# Patient Record
Sex: MALE | Race: WHITE | NOT HISPANIC OR LATINO | Employment: FULL TIME | URBAN - METROPOLITAN AREA
[De-identification: names, ages, dates, MRNs, and addresses within clinical notes are randomized per-mention and may not be internally consistent; named-entity substitution may affect disease eponyms.]

---

## 2017-02-27 ENCOUNTER — TELEPHONE (OUTPATIENT)
Dept: PALLIATIVE MEDICINE | Facility: CLINIC | Age: 62
End: 2017-02-27

## 2017-03-09 ENCOUNTER — MYC MEDICAL ADVICE (OUTPATIENT)
Dept: FAMILY MEDICINE | Facility: CLINIC | Age: 62
End: 2017-03-09

## 2017-03-09 DIAGNOSIS — Z12.11 COLON CANCER SCREENING: Primary | ICD-10-CM

## 2017-03-10 NOTE — TELEPHONE ENCOUNTER
I don't see colonoscopy referral in past 12 mos.   Routed patient mychart request for referral to [facility] to provider to address.  Leelee Ricardo RN  [facility]

## 2017-03-17 ENCOUNTER — MYC MEDICAL ADVICE (OUTPATIENT)
Dept: FAMILY MEDICINE | Facility: CLINIC | Age: 62
End: 2017-03-17

## 2017-03-17 DIAGNOSIS — N52.9 ERECTILE DYSFUNCTION, UNSPECIFIED ERECTILE DYSFUNCTION TYPE: Primary | ICD-10-CM

## 2017-03-17 RX ORDER — SILDENAFIL CITRATE 20 MG/1
TABLET ORAL
Qty: 20 TABLET | Refills: 5 | Status: SHIPPED | OUTPATIENT
Start: 2017-03-17 | End: 2018-03-07

## 2017-03-17 NOTE — TELEPHONE ENCOUNTER
Please see MyChart message below.  Patient requesting a trial of Sildenafil.  No active/history of med noted in chart.    Routed to PCP to advise.    Concepción Caro RN  [facility]

## 2017-03-31 ENCOUNTER — TRANSFERRED RECORDS (OUTPATIENT)
Dept: HEALTH INFORMATION MANAGEMENT | Facility: CLINIC | Age: 62
End: 2017-03-31

## 2017-04-11 ENCOUNTER — OFFICE VISIT (OUTPATIENT)
Dept: FAMILY MEDICINE | Facility: CLINIC | Age: 62
End: 2017-04-11
Payer: COMMERCIAL

## 2017-04-11 VITALS
WEIGHT: 172 LBS | HEART RATE: 71 BPM | HEIGHT: 73 IN | BODY MASS INDEX: 22.8 KG/M2 | TEMPERATURE: 97.1 F | DIASTOLIC BLOOD PRESSURE: 70 MMHG | SYSTOLIC BLOOD PRESSURE: 116 MMHG | OXYGEN SATURATION: 97 %

## 2017-04-11 DIAGNOSIS — L57.0 ACTINIC KERATOSES: ICD-10-CM

## 2017-04-11 DIAGNOSIS — N52.9 ERECTILE DYSFUNCTION, UNSPECIFIED ERECTILE DYSFUNCTION TYPE: ICD-10-CM

## 2017-04-11 DIAGNOSIS — Z23 NEED FOR ZOSTAVAX ADMINISTRATION: ICD-10-CM

## 2017-04-11 DIAGNOSIS — Z00.00 ROUTINE GENERAL MEDICAL EXAMINATION AT A HEALTH CARE FACILITY: Primary | ICD-10-CM

## 2017-04-11 DIAGNOSIS — K64.4 EXTERNAL HEMORRHOID: ICD-10-CM

## 2017-04-11 DIAGNOSIS — Z11.59 NEED FOR HEPATITIS C SCREENING TEST: ICD-10-CM

## 2017-04-11 PROCEDURE — 90471 IMMUNIZATION ADMIN: CPT | Performed by: FAMILY MEDICINE

## 2017-04-11 PROCEDURE — 99396 PREV VISIT EST AGE 40-64: CPT | Mod: 25 | Performed by: FAMILY MEDICINE

## 2017-04-11 PROCEDURE — 17000 DESTRUCT PREMALG LESION: CPT | Performed by: FAMILY MEDICINE

## 2017-04-11 PROCEDURE — 17003 DESTRUCT PREMALG LES 2-14: CPT | Performed by: FAMILY MEDICINE

## 2017-04-11 PROCEDURE — 90736 HZV VACCINE LIVE SUBQ: CPT | Performed by: FAMILY MEDICINE

## 2017-04-11 NOTE — NURSING NOTE
"Chief Complaint   Patient presents with     Physical       Initial /70 (BP Location: Right arm, Patient Position: Chair, Cuff Size: Adult Regular)  Pulse 71  Temp 97.1  F (36.2  C) (Oral)  Ht 6' 0.5\" (1.842 m)  Wt 172 lb (78 kg)  SpO2 97%  BMI 23.01 kg/m2 Estimated body mass index is 23.01 kg/(m^2) as calculated from the following:    Height as of this encounter: 6' 0.5\" (1.842 m).    Weight as of this encounter: 172 lb (78 kg).  Medication Reconciliation: complete   Paige Cheney CMA       "

## 2017-04-11 NOTE — PROGRESS NOTES
SUBJECTIVE:     CC: Micky Topete is an 61 year old male who presents for a preventative health visit.     Physical   Annual:     Getting at least 3 servings of Calcium per day::  NO    Bi-annual eye exam::  Yes    Dental care twice a year::  Yes    Sleep apnea or symptoms of sleep apnea::  Daytime drowsiness    Diet::  Regular (no restrictions)    Frequency of exercise::  None    Taking medications regularly::  Yes    Medication side effects::  Not applicable    Additional concerns today::  YES      Other concerns:     Hep C screening, Hemorrhoids.  He noticed a small external hemorrhoid pop out 2-3 weeks ago. It was mildly uncomfortable. He had it present when he had his colonoscopy a week and a half ago. It's getting smaller. It is not bleeding.  His colonoscopy was basically normal with no polyps.    Today's PHQ-2 Score:   PHQ-2 ( 1999 [employer]) 4/11/2017   Q1: Little interest or pleasure in doing things -   Q2: Feeling down, depressed or hopeless -   PHQ-2 Score -   Little interest or pleasure in doing things Not at all   Feeling down, depressed or hopeless Not at all   PHQ-2 Score 0       Abuse: Current or Past(Physical, Sexual or Emotional)- No  Do you feel safe in your environment - Yes    Social History   Substance Use Topics     Smoking status: Never Smoker     Smokeless tobacco: Never Used     Alcohol use Yes     The patient does not drink >3 drinks per day nor >7 drinks per week.    Last PSA:   PSA   Date Value Ref Range Status   10/22/2009 0.77 0 - 4 ug/L Final       Recent Labs   Lab Test  08/21/12   0731  10/22/09   1233   CHOL  185  222*   HDL  42  50   LDL  111  149*   TRIG  159*  116   CHOLHDLRATIO  4.4  4.4       Reviewed orders with patient. Reviewed health maintenance and updated orders accordingly - Yes    Reviewed and updated as needed this visit by clinical staff  Tobacco  Allergies  Med Hx  Surg Hx  Fam Hx  Soc Hx        Reviewed and updated as needed this visit by Provider             ROS:  C: NEGATIVE for fever, chills, change in weight  INTEGUMENTARY/SKIN: He still has a few red scaly spots on his face  E: NEGATIVE for vision changes or irritation  ENT: NEGATIVE for ear, mouth and throat problems  R: NEGATIVE for significant cough or SOB  CV: NEGATIVE for chest pain, palpitations or peripheral edema  GI: NEGATIVE for nausea, abdominal pain, heartburn, or change in bowel habits   male: He's had some mild erectile dysfunction, mainly after drinking alcohol. Sildenafil helps. No major urinary obstructive symptoms.  M: NEGATIVE for significant arthralgias or myalgia  N: NEGATIVE for weakness, dizziness or paresthesias  P: NEGATIVE for changes in mood or affect    Patient Active Problem List   Diagnosis     Hyperlipidemia with target LDL less than 130     Advanced directives, counseling/discussion     History of basal cell cancer     Cataract of both eyes     Actinic keratoses     Erectile dysfunction, unspecified erectile dysfunction type     Past Surgical History:   Procedure Laterality Date     APPENDECTOMY       BIOPSY      Left forearm, basal cell     COLONOSCOPY      Age 51     HEAD & NECK SURGERY      5th & 7th cervical disk bulge     SOFT TISSUE SURGERY      Rt shoulder bursitis, ligament stretch     VASECTOMY         Social History   Substance Use Topics     Smoking status: Never Smoker     Smokeless tobacco: Never Used     Alcohol use Yes     Family History   Problem Relation Age of Onset     DIABETES Mother      Eye Disorder Mother      Glaucoma Mother      Macular Degeneration Mother      CEREBROVASCULAR DISEASE Mother      Hypertension Father      HEART DISEASE Father      Cancer - colorectal Paternal Grandfather          Current Outpatient Prescriptions   Medication Sig Dispense Refill     sildenafil (REVATIO/VIAGRA) 20 MG tablet Take 1-4 tabs prior to intercourse as needed 20 tablet 5     triprolidine-pseudoePHEDrine (APRODINE) 2.5-60 MG TABS Take 1 tablet by mouth every 6  "hours as needed for allergies (Patient not taking: Reported on 4/11/2017) 100 tablet 1     valACYclovir (VALTREX) 1000 mg tablet 2 grams now, then 12 hours later, as needed for cold sores (Patient not taking: Reported on 4/11/2017) 4 tablet 10     cyclobenzaprine (FLEXERIL) 10 MG tablet Take 1 tablet (10 mg) by mouth 3 times daily as needed for muscle spasms (Patient not taking: Reported on 4/11/2017) 20 tablet 1     Allergies   Allergen Reactions     Sulfa Drugs      OBJECTIVE:     /70 (BP Location: Right arm, Patient Position: Chair, Cuff Size: Adult Regular)  Pulse 71  Temp 97.1  F (36.2  C) (Oral)  Ht 6' 0.5\" (1.842 m)  Wt 172 lb (78 kg)  SpO2 97%  BMI 23.01 kg/m2  EXAM:  GENERAL: healthy, alert and no distress  EYES: Eyes grossly normal to inspection, PERRL and conjunctivae and sclerae normal  HENT: ear canals and TM's normal, nose and mouth without ulcers or lesions  NECK: no adenopathy, no asymmetry, masses, or scars and thyroid normal to palpation  RESP: lungs clear to auscultation - no rales, rhonchi or wheezes  CV: regular rate and rhythm, normal S1 S2, no S3 or S4, no murmur, click or rub, no peripheral edema and peripheral pulses strong  ABDOMEN: soft, nontender, no hepatosplenomegaly, no masses  RECTAL: Tiny external hemorrhoid remains. Prostate feels normal.  MS: no gross musculoskeletal defects noted, no edema  SKIN: No skin cancers are seen, but he does have an erythematous scaly area on his right temple, one on his left temple, 2 on the upper right forehead, and one on his right mid cheek. These are all consistent with actinic keratoses. They were frozen today with liquid nitrogen.  NEURO: Normal strength and tone, mentation intact and speech normal  PSYCH: mentation appears normal, affect normal/bright    ASSESSMENT/PLAN:         ICD-10-CM    1. Routine general medical examination at a health care facility Z00.00 Hepatitis C Screen Reflex to HCV RNA Quant and Genotype     GLUCOSE     " "Lipid panel reflex to direct LDL     CBC with platelets   2. Actinic keratoses L57.0 DESTRUCT PREMALIGNANT LESION, FIRST     DESTRUCT PREMALIGNANT LESION, 2-14   3. External hemorrhoid K64.4    4. Erectile dysfunction, unspecified erectile dysfunction type N52.9    5. Need for hepatitis C screening test Z11.59    6. Need for Zostavax administration Z23 ZOSTER VACC LIVE SUBQ NJX     ADMIN 1st VACCINE     Blood pressure and other vital signs look good  We discussed the above items   We will have her return soon for fasting lab work as ordered above  Discussed protecting his skin from further sun damage  We will give him a Zostavax vaccine today  Get plenty of fiber and fluids in diet to keep bowel movements soft and regular and easily passed and we will expect the hemorrhoid to resorb over time  Recheck in one year, or sooner prn    COUNSELING:   Reviewed preventive health counseling, as reflected in patient instructions       Regular exercise       Healthy diet/nutrition       Vaccinated for: Zoster       reports that he has never smoked. He has never used smokeless tobacco.    Estimated body mass index is 23.01 kg/(m^2) as calculated from the following:    Height as of this encounter: 6' 0.5\" (1.842 m).    Weight as of this encounter: 172 lb (78 kg).       Counseling Resources:  ATP IV Guidelines  Pooled Cohorts Equation Calculator  FRAX Risk Assessment  ICSI Preventive Guidelines  Dietary Guidelines for Americans, 2010  USDA's MyPlate  ASA Prophylaxis  Lung CA Screening    Federico Salas MD  [facility]      Answers for HPI/ROS submitted by the patient on 4/11/2017   Q1: Little interest or pleasure in doing things: 0=Not at all  Q2: Feeling down, depressed or hopeless: 0=Not at all  PHQ-2 Score: 0    "

## 2017-04-11 NOTE — NURSING NOTE
Patient is covered under this program for the following reason:  Not [facility]NV[facility] Eligible: Insured - Has insurance that covers the cost of all vaccines (Not [facility] elligible because insurance already covers all vaccines)    Patient/parent was given the [facility] Eligibility Information sheet today, , with their vaccinations.      Screening Questionnaire for Adult Immunization   Are you sick today?   No    Do you have allergies to medications, food, a vaccine component or latex?   No    Have you ever had a serious reaction after receiving a vaccination?   No    Do you have a long-term health problem with heart disease, lung disease,  asthma, kidney disease, metabolic (e.g., diabetes), anemia, or other blood disorder?   No    Do you have cancer, leukemia,HIV/ AIDS, or any immune system problem?   No       In the past 3 months, have you taken medications that weaken your immune system, such as cortisone, prednisone, other steroids, or anticancer drugs, or have you had radiation treatments?   No    Have you had a seizure or a brain, or other nervous system problem?   No    During the past year, have you received a transfusion of blood or blood       products, or been given a  immune (gamma) globulin or an antiviral drug?   No    For women: Are you pregnant or is there a chance you could become         pregnant during the next month?   No    Have you received any vaccinations in the past 4 weeks?   No     Immunization questionnaire answers were all negative.     VIS for Zostavax given on same date of administration.  Patient instructed to remain in clinic for 20 minutes afterwards, and to report any adverse reaction to me immediately.    Staff signature/Title: Paige Cheney CMA

## 2017-04-11 NOTE — MR AVS SNAPSHOT
After Visit Summary   4/11/2017    Micky Topete    MRN: [id number]           Patient Information     Date Of Birth          1955        Visit Information        Provider Department      4/11/2017 9:40 AM Federico Salas MD [facility]        Today's Diagnoses     Routine general medical examination at a health care facility    -  1    Actinic keratoses        Need for hepatitis C screening test        Need for Zostavax administration          Care Instructions      Preventive Health Recommendations  Male Ages 50 - 64    Yearly exam:             See your health care provider every year in order to  o   Review health changes.   o   Discuss preventive care.    o   Review your medicines if your doctor has prescribed any.     Have a cholesterol test every 5 years, or more frequently if you are at risk for high cholesterol/heart disease.     Have a diabetes test (fasting glucose) every three years. If you are at risk for diabetes, you should have this test more often.     Have a colonoscopy at age 50, or have a yearly FIT test (stool test). These exams will check for colon cancer.      Talk with your health care provider about whether or not a prostate cancer screening test (PSA) is right for you.    You should be tested each year for STDs (sexually transmitted diseases), if you re at risk.     Shots: Get a flu shot each year. Get a tetanus shot every 10 years.     Nutrition:    Eat at least 5 servings of fruits and vegetables daily.     Eat whole-grain bread, whole-wheat pasta and brown rice instead of white grains and rice.     Talk to your provider about Calcium and Vitamin D.     Lifestyle    Exercise for at least 150 minutes a week (30 minutes a day, 5 days a week). This will help you control your weight and prevent disease.     Limit alcohol to one drink per day.     No smoking.     Wear sunscreen to prevent skin cancer.     See your dentist every six months for an exam  and cleaning.     See your eye doctor every 1 to 2 years.          Follow-ups after your visit        Follow-up notes from your care team     Return in about 1 year (around 4/11/2018).      Future tests that were ordered for you today     Open Future Orders        Priority Expected Expires Ordered    Hepatitis C Screen Reflex to HCV RNA Quant and Genotype Routine 4/12/2017 5/31/2017 4/11/2017    GLUCOSE Routine 4/12/2017 5/31/2017 4/11/2017    Lipid panel reflex to direct LDL Routine 4/12/2017 5/31/2017 4/11/2017    CBC with platelets Routine 4/12/2017 5/31/2017 4/11/2017            Who to contact     If you have questions or need follow up information about today's clinic visit or your schedule please contact [facility] directly at [phone].  Normal or non-critical lab and imaging results will be communicated to you by SPOhart, letter or phone within 4 business days after the clinic has received the results. If you do not hear from us within 7 days, please contact the clinic through SPOhart or phone. If you have a critical or abnormal lab result, we will notify you by phone as soon as possible.  Submit refill requests through [employer] or call your pharmacy and they will forward the refill request to us. Please allow 3 business days for your refill to be completed.          Additional Information About Your Visit        SPOhart Information     [employer] gives you secure access to your electronic health record. If you see a primary care provider, you can also send messages to your care team and make appointments. If you have questions, please call your primary care clinic.  If you do not have a primary care provider, please call [phone] and they will assist you.        Care EveryWhere ID     This is your Care EveryWhere ID. This could be used by other organizations to access your [facility] medical records  [id number]        Your Vitals Were     Pulse Temperature Height Pulse Oximetry BMI  "(Body Mass Index)       71 97.1  F (36.2  C) (Oral) 6' 0.5\" (1.842 m) 97% 23.01 kg/m2        Blood Pressure from Last 3 Encounters:   04/11/17 116/70   12/17/15 116/69   03/23/15 122/73    Weight from Last 3 Encounters:   04/11/17 172 lb (78 kg)   12/17/15 171 lb (77.6 kg)   03/23/15 175 lb 12 oz (79.7 kg)              We Performed the Following     ADMIN 1st VACCINE     ZOSTER VACC LIVE SUBQ NJX        Primary Care Provider Office Phone # Fax #    Federico Salas [phone]       [facility] [address] [facility] [address]        Thank you!     Thank you for choosing [facility]  for your care. Our goal is always to provide you with excellent care. Hearing back from our patients is one way we can continue to improve our services. Please take a few minutes to complete the written survey that you may receive in the mail after your visit with us. Thank you!             Your Updated Medication List - Protect others around you: Learn how to safely use, store and throw away your medicines at www.disposemymeds.org.          This list is accurate as of: 4/11/17 10:28 AM.  Always use your most recent med list.                   Brand Name Dispense Instructions for use    cyclobenzaprine 10 MG tablet    FLEXERIL    20 tablet    Take 1 tablet (10 mg) by mouth 3 times daily as needed for muscle spasms       sildenafil 20 MG tablet    REVATIO/VIAGRA    20 tablet    Take 1-4 tabs prior to intercourse as needed       triprolidine-pseudoePHEDrine 2.5-60 MG Tabs per tablet    APRODINE    100 tablet    Take 1 tablet by mouth every 6 hours as needed for allergies       valACYclovir 1000 mg tablet    VALTREX    4 tablet    2 grams now, then 12 hours later, as needed for cold sores         "

## 2017-04-25 DIAGNOSIS — Z00.00 ROUTINE GENERAL MEDICAL EXAMINATION AT A HEALTH CARE FACILITY: ICD-10-CM

## 2017-04-25 LAB
CHOLEST SERPL-MCNC: 171 MG/DL
ERYTHROCYTE [DISTWIDTH] IN BLOOD BY AUTOMATED COUNT: 12.5 % (ref 10–15)
GLUCOSE SERPL-MCNC: 98 MG/DL (ref 70–99)
HCT VFR BLD AUTO: 42.8 % (ref 40–53)
HCV AB SERPL QL IA: NORMAL
HDLC SERPL-MCNC: 53 MG/DL
HGB BLD-MCNC: 14.5 G/DL (ref 13.3–17.7)
LDLC SERPL CALC-MCNC: 86 MG/DL
MCH RBC QN AUTO: 30.5 PG (ref 26.5–33)
MCHC RBC AUTO-ENTMCNC: 33.9 G/DL (ref 31.5–36.5)
MCV RBC AUTO: 90 FL (ref 78–100)
NONHDLC SERPL-MCNC: 118 MG/DL
PLATELET # BLD AUTO: 210 10E9/L (ref 150–450)
RBC # BLD AUTO: 4.75 10E12/L (ref 4.4–5.9)
TRIGL SERPL-MCNC: 159 MG/DL
WBC # BLD AUTO: 6.1 10E9/L (ref 4–11)

## 2017-04-25 PROCEDURE — 36415 COLL VENOUS BLD VENIPUNCTURE: CPT | Performed by: FAMILY MEDICINE

## 2017-04-25 PROCEDURE — 82947 ASSAY GLUCOSE BLOOD QUANT: CPT | Performed by: FAMILY MEDICINE

## 2017-04-25 PROCEDURE — 80061 LIPID PANEL: CPT | Performed by: FAMILY MEDICINE

## 2017-04-25 PROCEDURE — 85027 COMPLETE CBC AUTOMATED: CPT | Performed by: FAMILY MEDICINE

## 2017-04-25 PROCEDURE — 86803 HEPATITIS C AB TEST: CPT | Performed by: FAMILY MEDICINE

## 2017-04-26 NOTE — PROGRESS NOTES
Boyd,  These lab results look really good. Your hepatitis C screening antibody test is normal/negative. Blood sugar is normal. Cholesterol values are basically normal and improved from years ago. Blood counts are all normal.  Keep up the good work!    Shravan

## 2017-05-15 ENCOUNTER — MYC MEDICAL ADVICE (OUTPATIENT)
Dept: FAMILY MEDICINE | Facility: CLINIC | Age: 62
End: 2017-05-15

## 2017-06-06 ENCOUNTER — OFFICE VISIT (OUTPATIENT)
Dept: FAMILY MEDICINE | Facility: CLINIC | Age: 62
End: 2017-06-06
Payer: COMMERCIAL

## 2017-06-06 VITALS
HEART RATE: 66 BPM | WEIGHT: 172 LBS | OXYGEN SATURATION: 97 % | BODY MASS INDEX: 23.01 KG/M2 | DIASTOLIC BLOOD PRESSURE: 71 MMHG | SYSTOLIC BLOOD PRESSURE: 123 MMHG | TEMPERATURE: 97.1 F
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
## 2017-06-06 DIAGNOSIS — M54.50 RIGHT-SIDED LOW BACK PAIN WITHOUT SCIATICA, UNSPECIFIED CHRONICITY: Primary | ICD-10-CM

## 2017-06-06 DIAGNOSIS — S70.361A TICK BITE OF RIGHT THIGH, INITIAL ENCOUNTER: ICD-10-CM

## 2017-06-06 PROCEDURE — 99214 OFFICE O/P EST MOD 30 MIN: CPT | Performed by: FAMILY MEDICINE

## 2017-06-06 ASSESSMENT — PAIN SCALES - GENERAL: PAINLEVEL: NO PAIN (0)

## 2017-06-06 NOTE — MR AVS SNAPSHOT
After Visit Summary   6/6/2017    Mikcy Topete    MRN: [id number]           Patient Information     Date Of Birth          1955        Visit Information        Provider Department      6/6/2017 6:40 AM Federico Salas MD [facility]        Today's Diagnoses     Right-sided low back pain without sciatica, unspecified chronicity    -  1    Tick bite of right thigh, initial encounter           Follow-ups after your visit        Follow-up notes from your care team     Return if symptoms worsen or fail to improve.      Your next 10 appointments already scheduled     Jun 08, 2017  7:45 PM CDT   MR LUMBAR SPINE W/O CONTRAST with [id number]   [facility]    [address] [facility] [address] [phone]           Take your medicines as usual, unless your doctor tells you not to. Bring a list of your current medicines to your exam (including vitamins, minerals and over-the-counter drugs). Also bring the results of similar scans you may have had.  Please remove any body piercings and hair extensions before you arrive.  Follow your doctor s orders. If you do not, we may have to postpone your exam.  You will not have contrast for this exam. You do not need to do anything special to prepare.  The MRI machine uses a strong magnet. Please wear clothes without metal (snaps, zippers). A sweatsuit works well, or we may give you a hospital gown.   **IMPORTANT** THE INSTRUCTIONS BELOW ARE ONLY FOR THOSE PATIENTS WHO HAVE BEEN TOLD THEY WILL RECEIVE SEDATION OR GENERAL ANESTHESIA DURING THEIR MRI PROCEDURE:  IF YOU WILL RECEIVE SEDATION (take medicine to help you relax during your exam):   You must get the medicine from your doctor before you arrive. Bring the medicine to the exam. Do not take it at home.   Arrive one hour early. Bring someone who can take you home after the test. Your medicine will make you sleepy. After the exam, you may not  drive, take a bus or take a taxi by yourself.   No eating 8 hours before your exam. You may have clear liquids up until 4 hours before your exam. (Clear liquids include water, clear tea, black coffee and fruit juice without pulp.)  IF YOU WILL RECEIVE ANESTHESIA (be asleep for your exam):   Arrive 1 1/2 hours early. Bring someone who can take you home after the test. You may not drive, take a bus or take a taxi by yourself.   No eating 8 hours before your exam. You may have clear liquids up until 4 hours before your exam. (Clear liquids include water, clear tea, black coffee and fruit juice without pulp.)   You will spend four to five hours in the recovery room.  Please call the Imaging Department at your exam site with any questions.              Future tests that were ordered for you today     Open Future Orders        Priority Expected Expires Ordered    MR Lumbar Spine w/o Contrast Routine  6/6/2018 6/6/2017            Who to contact     If you have questions or need follow up information about today's clinic visit or your schedule please contact [facility] directly at [phone].  Normal or non-critical lab and imaging results will be communicated to you by [employer], letter or phone within 4 business days after the clinic has received the results. If you do not hear from us within 7 days, please contact the clinic through [employer] or phone. If you have a critical or abnormal lab result, we will notify you by phone as soon as possible.  Submit refill requests through [employer] or call your pharmacy and they will forward the refill request to us. Please allow 3 business days for your refill to be completed.          Additional Information About Your Visit        [employer] Information     [employer] gives you secure access to your electronic health record. If you see a primary care provider, you can also send messages to your care team and make appointments. If you have questions, please call your  primary care clinic.  If you do not have a primary care provider, please call [phone] and they will assist you.        Care EveryWhere ID     This is your Care EveryWhere ID. This could be used by other organizations to access your [facility] medical records  [id number]        Your Vitals Were     Pulse Temperature Pulse Oximetry BMI (Body Mass Index)          66 97.1  F (36.2  C) (Oral) 97% 23.01 kg/m2         Blood Pressure from Last 3 Encounters:   06/06/17 123/71   04/11/17 116/70   12/17/15 116/69    Weight from Last 3 Encounters:   06/06/17 172 lb (78 kg)   04/11/17 172 lb (78 kg)   12/17/15 171 lb (77.6 kg)               Primary Care Provider Office Phone # Fax #    Federico Salas [phone]       [facility] [address] [facility] [address]        Thank you!     Thank you for choosing [facility]  for your care. Our goal is always to provide you with excellent care. Hearing back from our patients is one way we can continue to improve our services. Please take a few minutes to complete the written survey that you may receive in the mail after your visit with us. Thank you!             Your Updated Medication List - Protect others around you: Learn how to safely use, store and throw away your medicines at www.disposemymeds.org.          This list is accurate as of: 6/6/17  7:22 AM.  Always use your most recent med list.                   Brand Name Dispense Instructions for use    cyclobenzaprine 10 MG tablet    FLEXERIL    20 tablet    Take 1 tablet (10 mg) by mouth 3 times daily as needed for muscle spasms       sildenafil 20 MG tablet    REVATIO/VIAGRA    20 tablet    Take 1-4 tabs prior to intercourse as needed       triprolidine-pseudoePHEDrine 2.5-60 MG Tabs per tablet    APRODINE    100 tablet    Take 1 tablet by mouth every 6 hours as needed for allergies       valACYclovir 1000 mg tablet    VALTREX    4 tablet    2 grams now,  then 12 hours later, as needed for cold sores

## 2017-06-06 NOTE — NURSING NOTE
"Chief Complaint   Patient presents with     Back Pain     Low back strain, stiff, when moving wrong will have a sharp pain. Numbness in toes     Insect Bites     Tick bite       Initial /71 (BP Location: Right arm, Patient Position: Chair, Cuff Size: Adult Regular)  Pulse 66  Temp 97.1  F (36.2  C) (Oral)  Wt 172 lb (78 kg)  SpO2 97%  BMI 23.01 kg/m2 Estimated body mass index is 23.01 kg/(m^2) as calculated from the following:    Height as of 4/11/17: 6' 0.5\" (1.842 m).    Weight as of this encounter: 172 lb (78 kg).  Medication Reconciliation: complete   Paige Cheney CMA       "

## 2017-06-06 NOTE — PROGRESS NOTES
SUBJECTIVE:                                                    Micky Topete is a 61 year old male who presents to clinic today for the following health issues:      Back Pain      Duration: 2 years        Specific cause: turning/bending    Description:   Location of pain: low back right  Character of pain: cramping and when twisting stabbing pain  Pain radiation:none  New numbness or weakness in legs, not attributed to pain:  YES- numbness in the toes    Intensity: Currently 0/10, At its worst 3/10    History:   Pain interferes with job: YES, did yesterday  History of back problems: recurrent self limited episodes of low back pain in the past  Any previous MRI or X-rays: None in the last 20 years  Sees a specialist for back pain:  Chiropractor  Therapies tried without relief: none    Alleviating factors:   Improved by: chiropractor, cold, heat, massage, muscle relaxants and standing      Precipitating factors:  Worsened by: Lying flat or on his side    Functional and Psychosocial Screen (Mirlande STarT Back):      Not performed today       Accompanying Signs & Symptoms:  Risk of Fracture:  None  Risk of Cauda Equina:  None  Risk of Infection:  None  Risk of Cancer:  None  Risk of Ankylosing Spondylitis:  Onset at age <35, male, AND morning back stiffness. YES                 He started having low back discomfort at the age of 27 after doing some shoveling. He said intermittent right low back discomfort since then. In the last couple of years as gotten worse. He sees a chiropractor periodically for this. He just saw a chiropractor yesterday. He had an adjustment and that did seem to help.  The discomfort is mainly in the right low back. It does not go down the legs. He has had some mild numbness of his second and third toes at times. He was concerned about that.  He does have a family history of back and leg problems in his father, brother, and paternal uncle.  He has gone to physical therapy in the past. He is  interested in doing some imaging to further characterize the health of his back.    Check tick bite on inside right thigh, also. About a week ago he pulled off a wood tick from his right inner thigh. It had been on him for less than 24 hours. It did become increasingly red and swollen for a few days, but now it seems better again.      Problem list and histories reviewed & adjusted, as indicated.  Additional history: as documented    Patient Active Problem List   Diagnosis     Hyperlipidemia with target LDL less than 130     Advanced directives, counseling/discussion     History of basal cell cancer     Cataract of both eyes     Actinic keratoses     Erectile dysfunction, unspecified erectile dysfunction type     Right-sided low back pain without sciatica, unspecified chronicity     Past Surgical History:   Procedure Laterality Date     APPENDECTOMY       BIOPSY      Left forearm, basal cell     COLONOSCOPY      Age 51     HEAD & NECK SURGERY      5th & 7th cervical disk bulge     SOFT TISSUE SURGERY      Rt shoulder bursitis, ligament stretch     VASECTOMY         Social History   Substance Use Topics     Smoking status: Never Smoker     Smokeless tobacco: Never Used     Alcohol use Yes     Family History   Problem Relation Age of Onset     DIABETES Mother      Eye Disorder Mother      Glaucoma Mother      Macular Degeneration Mother      CEREBROVASCULAR DISEASE Mother      Hypertension Father      HEART DISEASE Father      Cancer - colorectal Paternal Grandfather          Current Outpatient Prescriptions   Medication Sig Dispense Refill     sildenafil (REVATIO/VIAGRA) 20 MG tablet Take 1-4 tabs prior to intercourse as needed 20 tablet 5     triprolidine-pseudoePHEDrine (APRODINE) 2.5-60 MG TABS Take 1 tablet by mouth every 6 hours as needed for allergies 100 tablet 1     valACYclovir (VALTREX) 1000 mg tablet 2 grams now, then 12 hours later, as needed for cold sores (Patient not taking: Reported on 4/11/2017) 4  tablet 10     cyclobenzaprine (FLEXERIL) 10 MG tablet Take 1 tablet (10 mg) by mouth 3 times daily as needed for muscle spasms (Patient not taking: Reported on 4/11/2017) 20 tablet 1     Allergies   Allergen Reactions     Sulfa Drugs        Reviewed and updated as needed this visit by clinical staff  Tobacco  Allergies  Med Hx  Surg Hx  Fam Hx  Soc Hx      Reviewed and updated as needed this visit by Provider         ROS:  Noncontributory except as above    OBJECTIVE:                                                    /71 (BP Location: Right arm, Patient Position: Chair, Cuff Size: Adult Regular)  Pulse 66  Temp 97.1  F (36.2  C) (Oral)  Wt 172 lb (78 kg)  SpO2 97%  BMI 23.01 kg/m2  Body mass index is 23.01 kg/(m^2).  GENERAL: healthy, alert and no distress  MS: He does have some mild right-sided discomfort palpation over the right lumbar paraspinous region. Slight increased discomfort there when he leans to the right. No significant pain with side-to-side trunk rotation or for flexion. Straight leg raising is negative bilaterally.  SKIN: He has some mild erythema around the area where he pulled off a tick a week ago. The erythema extends about 2 cm. No purulent drainage. He says this is improved from a few days ago.  NEURO: Normal strength and tone, perhaps slight decreased sensation over the second and third toes bilaterally. The rest of his sensation in the lower extremities is normal.    Diagnostic Test Results:  none      ASSESSMENT/PLAN:                                                        ICD-10-CM    1. Right-sided low back pain without sciatica, unspecified chronicity M54.5 MR Lumbar Spine w/o Contrast   2. Tick bite of right thigh, initial encounter S70.361A     W57.XXXA      I don't think his toe numbness is related to the back, but given his prolonged intermittent history of back problems, we elected to proceed with an MRI to further define his back anatomy  His chiropractor thought  this would be helpful as well  We will set that up for him and inform him of those results when available  In the meantime, continue with conservative therapy including stretching and strengthening exercises, chiropractic adjustments, Flexeril as needed, etc.  We will continue to follow the tick bite  I expect that to continue to resolve on its own  Recheck p.r.n. on that    Federico Salas MD  [facility]

## 2017-06-08 ENCOUNTER — RADIANT APPOINTMENT (OUTPATIENT)
Dept: MRI IMAGING | Facility: CLINIC | Age: 62
End: 2017-06-08
Attending: FAMILY MEDICINE
Payer: COMMERCIAL

## 2017-06-08 DIAGNOSIS — M54.50 RIGHT-SIDED LOW BACK PAIN WITHOUT SCIATICA, UNSPECIFIED CHRONICITY: ICD-10-CM

## 2017-06-08 PROCEDURE — 72148 MRI LUMBAR SPINE W/O DYE: CPT | Mod: TC

## 2017-06-09 NOTE — PROGRESS NOTES
Boyd,  You can access the results of your MRI results now.  Your lumbar MRI does show a number of degenerative changes present, most pronounced at L4-L5. Degenerative changes are more prominent on the right side than left, which could account for your right low back discomfort.  There is nothing here that would necessarily require surgery. I would suggest continuing with conservative treatment including chiropractic treatments/adjustments as needed. If your symptoms worsen over time, we could consider epidural spinal injections and/or referral to a back specialist or possible surgical treatment.    Shravan

## 2017-06-20 ENCOUNTER — MYC MEDICAL ADVICE (OUTPATIENT)
Dept: FAMILY MEDICINE | Facility: CLINIC | Age: 62
End: 2017-06-20

## 2017-06-20 DIAGNOSIS — M54.50 RIGHT-SIDED LOW BACK PAIN WITHOUT SCIATICA, UNSPECIFIED CHRONICITY: Primary | ICD-10-CM

## 2017-06-20 NOTE — TELEPHONE ENCOUNTER
Please see MyChart message below regarding PT referral.    Routed to PCP.    Concepción Caro RN  [facility]

## 2017-06-27 ENCOUNTER — THERAPY VISIT (OUTPATIENT)
Dept: PHYSICAL THERAPY | Facility: CLINIC | Age: 62
End: 2017-06-27
Payer: COMMERCIAL

## 2017-06-27 DIAGNOSIS — G89.29 CHRONIC RIGHT-SIDED LOW BACK PAIN WITHOUT SCIATICA: Primary | ICD-10-CM

## 2017-06-27 DIAGNOSIS — M54.50 CHRONIC RIGHT-SIDED LOW BACK PAIN WITHOUT SCIATICA: Primary | ICD-10-CM

## 2017-06-27 PROCEDURE — 97161 PT EVAL LOW COMPLEX 20 MIN: CPT | Mod: GP | Performed by: PHYSICAL THERAPIST

## 2017-06-27 PROCEDURE — 97110 THERAPEUTIC EXERCISES: CPT | Mod: GP | Performed by: PHYSICAL THERAPIST

## 2017-06-27 NOTE — PROGRESS NOTES
[facility] Initial Evaluation      Subjective:    Patient is a 62 year old male presenting with rehab back hpi. The history is provided by the patient.   Micky Topete is a 62 year old male with a lumbar condition.  Condition occurred with:  Degenerative joint disease.  Condition occurred: for unknown reasons.  This is a chronic condition  Chronic with recent flare up after shoveling snow. MD referral 6/20/2017.  .    Patient reports pain:  Lumbar spine right and lower lumbar spine.  Radiates to:  No radiation.  Pain is described as aching and sharp and is intermittent and reported as 1/10 (only pain with certain motions).  Associated symptoms:  Loss of motion/stiffness and loss of motion. Pain is the same all the time.  Symptoms are exacerbated by twisting and bending (SB) and relieved by activity/movement and rest.  Since onset symptoms are unchanged.  Special tests:  MRI (DDD L4-5; foraminal stenosis greatest R L4-5;  broad based central disc protrusion L4-5).      General health as reported by patient is good.  Pertinent medical history includes:  None (back spasms).  Medical allergies: yes (sulfa).    Current medications:  Anti-inflammatory and other (naprxen prn).  Current occupation is pharmacist.  Patient is working in normal job without restrictions.  Primary job tasks include:  Prolonged standing and repetitive tasks.        Red flags:  Pain at rest/night (night pain with transfers).                        Objective:    Standing Alignment:        Lumbar:  Normal                           Lumbar/SI Evaluation  ROM:    AROM Lumbar:   Flexion:            Full  Ext:                    Full   Side Bend:        Left:  Decreased 25%    Right:  Decreased 25%  Rotation:           Left:  Full with pain    Right:  Full  Side Glide:        Left:     Right:           Lumbar Myotomes:  normal                  Neural Tension/Mobility:      Left side:SLR or Slump  negative.     Right side:   Slump  or SLR  negative.   Lumbar Palpation:      Tenderness not present at Left:    Quadratus Lumborum; Erector Spinae or Piriformis  Tenderness present at Right: Quadratus Lumborum; Erector Spinae and Piriformis                                                     General     ROS    Assessment/Plan:      Patient is a 62 year old male with lumbar complaints.    Patient has the following significant findings with corresponding treatment plan.                Diagnosis 1:  R LBP  Pain -  manual therapy, self management, education, directional preference exercise and home program  Decreased ROM/flexibility - manual therapy and therapeutic exercise  Decreased strength - therapeutic exercise and therapeutic activities  Decreased function - therapeutic activities    Therapy Evaluation Codes:   1) History comprised of:   Personal factors that impact the plan of care:      None.    Comorbidity factors that impact the plan of care are:      None.     Medications impacting care: Anti-inflammatory.  2) Examination of Body Systems comprised of:   Body structures and functions that impact the plan of care:      Lumbar spine.   Activity limitations that impact the plan of care are:      transfers.  3) Clinical presentation characteristics are:   Stable/Uncomplicated.  4) Decision-Making    Low complexity using standardized patient assessment instrument and/or measureable assessment of functional outcome.  Cumulative Therapy Evaluation is: Low complexity.    Previous and current functional limitations:  (See Goal Flow Sheet for this information)    Short term and Long term goals: (See Goal Flow Sheet for this information)     Communication ability:  Patient appears to be able to clearly communicate and understand verbal and written communication and follow directions correctly.  Treatment Explanation - The following has been discussed with the patient:   RX ordered/plan of care  Anticipated outcomes  Possible risks and side effects  This  patient would benefit from PT intervention to resume normal activities.   Rehab potential is good.    Frequency:  2 X a month, once daily  Duration:  for 2 months  Discharge Plan:  Achieve all LTG.  Independent in home treatment program.  Reach maximal therapeutic benefit.    Please refer to the daily flowsheet for treatment today, total treatment time and time spent performing 1:1 timed codes.

## 2017-06-27 NOTE — MR AVS SNAPSHOT
After Visit Summary   6/27/2017    Micky Topete    MRN: [id number]           Patient Information     Date Of Birth          1955        Visit Information        Provider Department      6/27/2017 9:10 AM Sathya Bennett, PT [facility]        Today's Diagnoses     Chronic right-sided low back pain without sciatica    -  1       Follow-ups after your visit        Your next 10 appointments already scheduled     Jul 03, 2017  4:40 PM CDT   (Arrive by 3:20 PM)   [facility] Spine with Sathya Bennett PT   [facility] ([facility])    [address]  [facility] [id number] [phone]              Who to contact     If you have questions or need follow up information about today's clinic visit or your schedule please contact [facility] PT directly at [phone].  Normal or non-critical lab and imaging results will be communicated to you by MyChart, letter or phone within 4 business days after the clinic has received the results. If you do not hear from us within 7 days, please contact the clinic through T-PRO Solutionshart or phone. If you have a critical or abnormal lab result, we will notify you by phone as soon as possible.  Submit refill requests through Zazuba or call your pharmacy and they will forward the refill request to us. Please allow 3 business days for your refill to be completed.          Additional Information About Your Visit        MyChart Information     Z[employer] gives you secure access to your electronic health record. If you see a primary care provider, you can also send messages to your care team and make appointments. If you have questions, please call your primary care clinic.  If you do not have a primary care provider, please call [phone] and they will assist you.        Care EveryWhere ID     This is your Care EveryWhere ID. This could be used  by other organizations to access your [place] medical records  [id number]         Blood Pressure from Last 3 Encounters:   06/06/17 123/71   04/11/17 116/70   12/17/15 116/69    Weight from Last 3 Encounters:   06/06/17 78 kg (172 lb)   04/11/17 78 kg (172 lb)   12/17/15 77.6 kg (171 lb)              We Performed the Following     HC PT EVAL, LOW COMPLEXITY     MARY INITIAL EVAL REPORT     THERAPEUTIC EXERCISES        Primary Care Provider Office Phone # Fax #    Federico Salas [phone]       [facility] [address]  [facility] [place] [address]        Equal Access to Services     LUIGI MIN : Hadii ashley zaldivaro Soalanna, waaxda luqadaha, qaybta kaalmada adeegyada, jerald lay. So [facility] [phone].    ATENCIÓN: Si habla español, tiene a cai disposición servicios gratuitos de asistencia lingüística. Llame al [phone].    We comply with applicable federal civil rights laws and [place] laws. We do not discriminate on the basis of race, color, national origin, age, disability sex, sexual orientation or gender identity.            Thank you!     Thank you for choosing [facility]  for your care. Our goal is always to provide you with excellent care. Hearing back from our patients is one way we can continue to improve our services. Please take a few minutes to complete the written survey that you may receive in the mail after your visit with us. Thank you!             Your Updated Medication List - Protect others around you: Learn how to safely use, store and throw away your medicines at www.disposemymeds.org.          This list is accurate as of: 6/27/17  1:02 PM.  Always use your most recent med list.                   Brand Name Dispense Instructions for use Diagnosis    cyclobenzaprine 10 MG tablet    FLEXERIL    20 tablet    Take 1 tablet (10 mg) by mouth 3 times daily as needed for muscle spasms     Back pain       sildenafil 20 MG tablet    REVATIO/VIAGRA    20 tablet    Take 1-4 tabs prior to intercourse as needed    Erectile dysfunction, unspecified erectile dysfunction type       triprolidine-pseudoePHEDrine 2.5-60 MG Tabs per tablet    APRODINE    100 tablet    Take 1 tablet by mouth every 6 hours as needed for allergies    Allergic rhinitis, unspecified allergic rhinitis trigger, unspecified rhinitis seasonality       valACYclovir 1000 mg tablet    VALTREX    4 tablet    2 grams now, then 12 hours later, as needed for cold sores    Herpes labialis

## 2017-07-03 ENCOUNTER — THERAPY VISIT (OUTPATIENT)
Dept: PHYSICAL THERAPY | Facility: CLINIC | Age: 62
End: 2017-07-03
Payer: COMMERCIAL

## 2017-07-03 DIAGNOSIS — M54.50 CHRONIC RIGHT-SIDED LOW BACK PAIN WITHOUT SCIATICA: ICD-10-CM

## 2017-07-03 DIAGNOSIS — G89.29 CHRONIC RIGHT-SIDED LOW BACK PAIN WITHOUT SCIATICA: ICD-10-CM

## 2017-07-03 PROCEDURE — 97110 THERAPEUTIC EXERCISES: CPT | Mod: GP | Performed by: PHYSICAL THERAPIST

## 2017-07-24 ENCOUNTER — THERAPY VISIT (OUTPATIENT)
Dept: PHYSICAL THERAPY | Facility: CLINIC | Age: 62
End: 2017-07-24
Payer: COMMERCIAL

## 2017-07-24 DIAGNOSIS — G89.29 CHRONIC RIGHT-SIDED LOW BACK PAIN WITHOUT SCIATICA: ICD-10-CM

## 2017-07-24 DIAGNOSIS — M54.50 CHRONIC RIGHT-SIDED LOW BACK PAIN WITHOUT SCIATICA: ICD-10-CM

## 2017-07-24 PROCEDURE — 97110 THERAPEUTIC EXERCISES: CPT | Mod: GP | Performed by: PHYSICAL THERAPIST

## 2017-07-24 PROCEDURE — 97112 NEUROMUSCULAR REEDUCATION: CPT | Mod: GP | Performed by: PHYSICAL THERAPIST

## 2017-08-21 ENCOUNTER — THERAPY VISIT (OUTPATIENT)
Dept: PHYSICAL THERAPY | Facility: CLINIC | Age: 62
End: 2017-08-21
Payer: COMMERCIAL

## 2017-08-21 DIAGNOSIS — M54.50 CHRONIC RIGHT-SIDED LOW BACK PAIN WITHOUT SCIATICA: ICD-10-CM

## 2017-08-21 DIAGNOSIS — G89.29 CHRONIC RIGHT-SIDED LOW BACK PAIN WITHOUT SCIATICA: ICD-10-CM

## 2017-08-21 PROCEDURE — 97110 THERAPEUTIC EXERCISES: CPT | Mod: GP | Performed by: PHYSICAL THERAPIST

## 2017-08-21 PROCEDURE — 97140 MANUAL THERAPY 1/> REGIONS: CPT | Mod: GP | Performed by: PHYSICAL THERAPIST

## 2017-08-21 NOTE — MR AVS SNAPSHOT
After Visit Summary   8/21/2017    Micky Topete    MRN: [id number]           Patient Information     Date Of Birth          1955        Visit Information        Provider Department      8/21/2017 4:00 PM Sathya Bennett, PT [facility]        Today's Diagnoses     Chronic right-sided low back pain without sciatica           Follow-ups after your visit        Your next 10 appointments already scheduled     Aug 24, 2017  2:30 PM CDT   Return Visit with Rubi Thomas OD   [facility] ([facility]    [address] [facility] [id number] [phone]            Sep 07, 2017  4:00 PM CDT   [facility] Spine with Sathya Bennett PT   [facility] ([facility] [place] [facility])    [address] [facility] [id number] [phone]              Who to contact     If you have questions or need follow up information about today's clinic visit or your schedule please contact [facility] PT directly at [phone].  Normal or non-critical lab and imaging results will be communicated to you by Captive[employer]hart, letter or phone within 4 business days after the clinic has received the results. If you do not hear from us within 7 days, please contact the clinic through [employer]hart or phone. If you have a critical or abnormal lab result, we will notify you by phone as soon as possible.  Submit refill requests through [employer] or call your pharmacy and they will forward the refill request to us. Please allow 3 business days for your refill to be completed.          Additional Information About Your Visit        MyChart Information     [employer] gives you secure access to your electronic health record. If you see a primary care provider, you can also send messages to your care team and make appointments. If you have questions, please call your primary care clinic.  If you  do not have a primary care provider, please call [phone] and they will assist you.        Care EveryWhere ID     This is your Care EveryWhere ID. This could be used by other organizations to access your [place] medical records  [id number]         Blood Pressure from Last 3 Encounters:   06/06/17 123/71   04/11/17 116/70   12/17/15 116/69    Weight from Last 3 Encounters:   06/06/17 78 kg (172 lb)   04/11/17 78 kg (172 lb)   12/17/15 77.6 kg (171 lb)              We Performed the Following     MANUAL THER TECH,1+REGIONS,EA 15 MIN     THERAPEUTIC EXERCISES        Primary Care Provider Office Phone # Fax #    Federico Salas [phone]       [address] [facility] [place] [address]        Equal Access to Services     LUIGI MIN : Hadii aad ku hadasho Soomaali, waaxda luqadaha, qaybta kaalmada adeegyada, jerald canchola hayirish santoro . So [facility] [phone].    ATENCIÓN: Si habla español, tiene a cai disposición servicios gratuitos de asistencia lingüística. Roland al [phone].    We comply with applicable federal civil rights laws and [place] laws. We do not discriminate on the basis of race, color, national origin, age, disability sex, sexual orientation or gender identity.            Thank you!     Thank you for choosing [facility]  for your care. Our goal is always to provide you with excellent care. Hearing back from our patients is one way we can continue to improve our services. Please take a few minutes to complete the written survey that you may receive in the mail after your visit with us. Thank you!             Your Updated Medication List - Protect others around you: Learn how to safely use, store and throw away your medicines at www.disposemymeds.org.          This list is accurate as of: 8/21/17  4:38 PM.  Always use your most recent med list.                   Brand Name Dispense Instructions for use Diagnosis     cyclobenzaprine 10 MG tablet    FLEXERIL    20 tablet    Take 1 tablet (10 mg) by mouth 3 times daily as needed for muscle spasms    Back pain       sildenafil 20 MG tablet    REVATIO/VIAGRA    20 tablet    Take 1-4 tabs prior to intercourse as needed    Erectile dysfunction, unspecified erectile dysfunction type       triprolidine-pseudoePHEDrine 2.5-60 MG Tabs per tablet    APRODINE    100 tablet    Take 1 tablet by mouth every 6 hours as needed for allergies    Allergic rhinitis, unspecified allergic rhinitis trigger, unspecified rhinitis seasonality       valACYclovir 1000 mg tablet    VALTREX    4 tablet    2 grams now, then 12 hours later, as needed for cold sores    Herpes labialis

## 2017-08-24 ENCOUNTER — OFFICE VISIT (OUTPATIENT)
Dept: OPTOMETRY | Facility: CLINIC | Age: 62
End: 2017-08-24
Payer: COMMERCIAL

## 2017-08-24 DIAGNOSIS — H43.811 PVD (POSTERIOR VITREOUS DETACHMENT), RIGHT EYE: Primary | ICD-10-CM

## 2017-08-24 PROCEDURE — 99213 OFFICE O/P EST LOW 20 MIN: CPT | Performed by: OPTOMETRIST

## 2017-08-24 ASSESSMENT — SLIT LAMP EXAM - LIDS
COMMENTS: NORMAL
COMMENTS: NORMAL

## 2017-08-24 ASSESSMENT — TONOMETRY
IOP_METHOD: APPLANATION
OS_IOP_MMHG: 16
OD_IOP_MMHG: 14

## 2017-08-24 ASSESSMENT — VISUAL ACUITY
OS_CC+: -1
OS_CC: 20/20
OD_CC: 20/40
METHOD: SNELLEN - LINEAR
CORRECTION_TYPE: GLASSES

## 2017-08-24 ASSESSMENT — CUP TO DISC RATIO
OS_RATIO: 0.3
OD_RATIO: 0.2

## 2017-08-24 ASSESSMENT — EXTERNAL EXAM - RIGHT EYE: OD_EXAM: NORMAL

## 2017-08-24 ASSESSMENT — EXTERNAL EXAM - LEFT EYE: OS_EXAM: NORMAL

## 2017-08-24 NOTE — MR AVS SNAPSHOT
After Visit Summary   8/24/2017    Micky Topete    MRN: [id number]           Patient Information     Date Of Birth          1955        Visit Information        Provider Department      8/24/2017 2:30 PM Rubi Thomas OD [facility] [place]        Today's Diagnoses     PVD (posterior vitreous detachment), right eye    -  1      Care Instructions      A posterior vitreous detachment is nothing to worry about.  You have a jelly like substance that fills the inside of the eye, as you get older, that gel shrinks a little and when it shrinks, it pulls away from the back of the eye.  When it pulls away you can see a floater, as time goes on, the floater may shrink down out of your line of sight and you won't notice it so often.  There is a little greater risk of developing a retinal detachment since there's nothing pressing against the retina, so if you start to notice flashes of light or sudden vision changes, return to the clinic as soon as possible, otherwise return as needed.    Return to clinic as needed    Rubi Thomas O.D.  [facility]  [id number] [phone]              Follow-ups after your visit        Your next 10 appointments already scheduled     Sep 07, 2017  4:00 PM CDT   [facility] with Sathya Bennett PT   [facility] ([facility])    [address] [facility] [address] [phone]            Sep 27, 2017  9:00 AM CDT   New Visit with Rubi Thomas OD   [facility] [place] ([facility])    [address] [place] [id number] [phone]              Who to contact     If you have questions or need follow up information about today's clinic visit or your schedule please contact [facility] [place] directly at [phone].  Normal or non-critical lab and imaging results will be communicated to you by Andres  letter or phone within 4 business days after the clinic has received the results. If you do not hear from us within 7 days, please contact the clinic through Witsbits or phone. If you have a critical or abnormal lab result, we will notify you by phone as soon as possible.  Submit refill requests through Witsbits or call your pharmacy and they will forward the refill request to us. Please allow 3 business days for your refill to be completed.          Additional Information About Your Visit        [email or web address] Information     Witsbits gives you secure access to your electronic health record. If you see a primary care provider, you can also send messages to your care team and make appointments. If you have questions, please call your primary care clinic.  If you do not have a primary care provider, please call [phone] and they will assist you.        Care EveryWhere ID     This is your Care EveryWhere ID. This could be used by other organizations to access your [facility] medical records  [id number]         Blood Pressure from Last 3 Encounters:   06/06/17 123/71   04/11/17 116/70   12/17/15 116/69    Weight from Last 3 Encounters:   06/06/17 78 kg (172 lb)   04/11/17 78 kg (172 lb)   12/17/15 77.6 kg (171 lb)              Today, you had the following     No orders found for display       Primary Care Provider Office Phone # Fax #    Federico Salas [phone]       [address] [facility] [address]        Equal Access to Services     [facility] : Hadii ashley ku hadasho Sobonitaali, waaxda luqadaha, qaybta kaalmada adelaurayada, jerald lay. So [facility] [phone].    ATENCIÓN: Si habla español, tiene a cai disposición servicios gratuitos de asistencia lingüística. Llame al [phone].    We comply with applicable federal civil rights laws and [place] laws. We do not discriminate on the basis of race, color, national origin, age, disability sex, sexual orientation  or gender identity.            Thank you!     Thank you for choosing [facility]  for your care. Our goal is always to provide you with excellent care. Hearing back from our patients is one way we can continue to improve our services. Please take a few minutes to complete the written survey that you may receive in the mail after your visit with us. Thank you!             Your Updated Medication List - Protect others around you: Learn how to safely use, store and throw away your medicines at www.disposemymeds.org.          This list is accurate as of: 8/24/17  3:51 PM.  Always use your most recent med list.                   Brand Name Dispense Instructions for use Diagnosis    cyclobenzaprine 10 MG tablet    FLEXERIL    20 tablet    Take 1 tablet (10 mg) by mouth 3 times daily as needed for muscle spasms    Back pain       sildenafil 20 MG tablet    REVATIO/VIAGRA    20 tablet    Take 1-4 tabs prior to intercourse as needed    Erectile dysfunction, unspecified erectile dysfunction type       triprolidine-pseudoePHEDrine 2.5-60 MG Tabs per tablet    APRODINE    100 tablet    Take 1 tablet by mouth every 6 hours as needed for allergies    Allergic rhinitis, unspecified allergic rhinitis trigger, unspecified rhinitis seasonality       valACYclovir 1000 mg tablet    VALTREX    4 tablet    2 grams now, then 12 hours later, as needed for cold sores    Herpes labialis

## 2017-08-24 NOTE — PATIENT INSTRUCTIONS
A posterior vitreous detachment is nothing to worry about.  You have a jelly like substance that fills the inside of the eye, as you get older, that gel shrinks a little and when it shrinks, it pulls away from the back of the eye.  When it pulls away you can see a floater, as time goes on, the floater may shrink down out of your line of sight and you won't notice it so often.  There is a little greater risk of developing a retinal detachment since there's nothing pressing against the retina, so if you start to notice flashes of light or sudden vision changes, return to the clinic as soon as possible, otherwise return as needed.    Return to clinic as needed    Rubi Thomas O.D.  [address]  [id number] [phone]

## 2017-08-24 NOTE — PROGRESS NOTES
Chief Complaint   Patient presents with     Eye Problem Right Eye           HPI    Symptoms:     Flashes      Duration:  1 week   Frequency:  Constant          Comments:  Patient says it looks a piece of seaweed. The spot stays in position and doesn't move. Brown color, flashing when patient would look to the right and down, continous if the patient moved his eye.  Hasn't noticed flashes now for a couple of days.             Rubi Thomas, OD     See Review Of Systems     HPI and ROS performed by Optometrist  scribed by Aline Ling, Optometric Tech    Medical, surgical and family histories reviewed and updated 8/24/2017.         OBJECTIVE: See Ophthalmology exam    ASSESSMENT:    ICD-10-CM    1. PVD (posterior vitreous detachment), right eye H43.811       PLAN:  A posterior vitreous detachment is nothing to worry about.  You have a jelly like substance that fills the inside of the eye, as you get older, that gel shrinks a little and when it shrinks, it pulls away from the back of the eye.  When it pulls away you can see a floater, as time goes on, the floater may shrink down out of your line of sight and you won't notice it so often.  There is a little greater risk of developing a retinal detachment since there's nothing pressing against the retina, so if you start to notice flashes of light or sudden vision changes, return to the clinic as soon as possible, otherwise return as needed.    Return to clinic as needed    Rubi Thomas O.D.  [address]  [address]    [phone]

## 2017-09-27 ENCOUNTER — OFFICE VISIT (OUTPATIENT)
Dept: OPTOMETRY | Facility: CLINIC | Age: 62
End: 2017-09-27
Payer: COMMERCIAL

## 2017-09-27 DIAGNOSIS — H43.811 PVD (POSTERIOR VITREOUS DETACHMENT), RIGHT EYE: ICD-10-CM

## 2017-09-27 DIAGNOSIS — H52.13 MYOPIA WITH PRESBYOPIA OF BOTH EYES: Primary | ICD-10-CM

## 2017-09-27 DIAGNOSIS — H52.221 REGULAR ASTIGMATISM OF RIGHT EYE: ICD-10-CM

## 2017-09-27 DIAGNOSIS — H52.4 MYOPIA WITH PRESBYOPIA OF BOTH EYES: Primary | ICD-10-CM

## 2017-09-27 DIAGNOSIS — H25.813 COMBINED FORMS OF AGE-RELATED CATARACT OF BOTH EYES: ICD-10-CM

## 2017-09-27 PROCEDURE — 92014 COMPRE OPH EXAM EST PT 1/>: CPT | Performed by: OPTOMETRIST

## 2017-09-27 PROCEDURE — 92015 DETERMINE REFRACTIVE STATE: CPT | Performed by: OPTOMETRIST

## 2017-09-27 ASSESSMENT — CUP TO DISC RATIO
OS_RATIO: 0.3
OD_RATIO: 0.2

## 2017-09-27 ASSESSMENT — REFRACTION_WEARINGRX
OD_AXIS: 165
OD_CYLINDER: +0.50
OD_ADD: +2.50
OS_ADD: +2.50
OS_SPHERE: -3.00
OD_SPHERE: -3.25
OS_CYLINDER: SPHERE

## 2017-09-27 ASSESSMENT — CONF VISUAL FIELD
OS_NORMAL: 1
OD_NORMAL: 1

## 2017-09-27 ASSESSMENT — VISUAL ACUITY
METHOD: SNELLEN - LINEAR
OD_CC: 20/60
CORRECTION_TYPE: GLASSES
OD_SC: 20/400
OS_CC: 20/20
OS_CC: 20/30
OD_CC: 20/30
OS_SC: 20/20
OS_SC: 20/150
OD_SC: 20/40

## 2017-09-27 ASSESSMENT — REFRACTION_MANIFEST
OD_SPHERE: -3.00
OD_AXIS: 135
OS_CYLINDER: SPHERE
OD_CYLINDER: +0.75
OS_SPHERE: -2.75
OD_ADD: +2.25
OS_ADD: +2.25

## 2017-09-27 ASSESSMENT — TONOMETRY
OD_IOP_MMHG: 17
OS_IOP_MMHG: 17
IOP_METHOD: APPLANATION

## 2017-09-27 ASSESSMENT — SLIT LAMP EXAM - LIDS
COMMENTS: NORMAL
COMMENTS: NORMAL

## 2017-09-27 ASSESSMENT — EXTERNAL EXAM - LEFT EYE: OS_EXAM: NORMAL

## 2017-09-27 ASSESSMENT — EXTERNAL EXAM - RIGHT EYE: OD_EXAM: NORMAL

## 2017-09-27 NOTE — PROGRESS NOTES
Chief Complaint   Patient presents with     COMPREHENSIVE EYE EXAM      Accompanied by self  Last Eye Exam: 1 year ago  Dilated Previously: Yes    What are you currently using to see?  glasses and contacts-no fitting       Distance Vision Acuity: Noticed gradual change in right eye    Near Vision Acuity: Satisfied with vision while reading  with glasses    Eye Comfort: dry and itchy  Do you use eye drops? : Yes: when needed  Occupation or Hobbies: Pharmacist    Aline Ling, Optometric Tech          Medical, surgical and family histories reviewed and updated 9/27/2017.       OBJECTIVE: See Ophthalmology exam    ASSESSMENT:    ICD-10-CM    1. Myopia with presbyopia of both eyes H52.13 EYE EXAM (SIMPLE-NONBILLABLE)    H52.4 REFRACTION   2. Regular astigmatism of right eye H52.221 EYE EXAM (SIMPLE-NONBILLABLE)     REFRACTION   3. Combined forms of age-related cataract of both eyes H25.813 EYE EXAM (SIMPLE-NONBILLABLE)   4. PVD (posterior vitreous detachment), right eye H43.811 EYE EXAM (SIMPLE-NONBILLABLE)      PLAN:   A final glasses prescription was given.  Allow time for adaptation.  The glasses may cause dizziness and affect depth perception for awhile.    Monitor cataracts by having yearly exams.  Wear sunglasses when outside.    A posterior vitreous detachment is nothing to worry about.  You have a jelly like substance that fills the inside of the eye, as you get older, that gel shrinks a little and when it shrinks, it pulls away from the back of the eye.  When it pulls away you can see a floater, as time goes on, the floater may shrink down out of your line of sight and you won't notice it so often.  There is a little greater risk of developing a retinal detachment since there's nothing pressing against the retina, so if you start to notice flashes of light or sudden vision changes, return to the clinic as soon as possible, otherwise return as needed.    Return to clinic 1 year for Comprehensive Vision  Exam      Rubi Thomas O.D  [address] [place]  [address]    [phone]

## 2017-09-27 NOTE — MR AVS SNAPSHOT
After Visit Summary   9/27/2017    Micky Topete    MRN: [id number]           Patient Information     Date Of Birth          1955        Visit Information        Provider Department      9/27/2017 9:00 AM Rubi Thomas OD [facility]        Today's Diagnoses     Myopia with presbyopia of both eyes    -  1    Regular astigmatism of right eye        Combined forms of age-related cataract of both eyes        PVD (posterior vitreous detachment), right eye          Care Instructions        A final glasses prescription was given.  Allow time for adaptation.  The glasses may cause dizziness and affect depth perception for awhile.    Monitor cataracts by having yearly exams.  Wear sunglasses when outside.    A posterior vitreous detachment is nothing to worry about.  You have a jelly like substance that fills the inside of the eye, as you get older, that gel shrinks a little and when it shrinks, it pulls away from the back of the eye.  When it pulls away you can see a floater, as time goes on, the floater may shrink down out of your line of sight and you won't notice it so often.  There is a little greater risk of developing a retinal detachment since there's nothing pressing against the retina, so if you start to notice flashes of light or sudden vision changes, return to the clinic as soon as possible, otherwise return as needed.    Return to clinic 1 year for Comprehensive Vision Exam      Rubi Thomas O.D  [address]  [place]  [address]    [phone]                  Follow-ups after your visit        Follow-up notes from your care team     Return in about 1 year (around 9/27/2018) for Eye Exam.      Who to contact     If you have questions or need follow up information about today's clinic visit or your schedule please contact [facility] directly at [phone].  Normal or non-critical lab and imaging results will be  communicated to you by MyChart, letter or phone within 4 business days after the clinic has received the results. If you do not hear from us within 7 days, please contact the clinic through Lumicst or phone. If you have a critical or abnormal lab result, we will notify you by phone as soon as possible.  Submit refill requests through [email or web address] or call your pharmacy and they will forward the refill request to us. Please allow 3 business days for your refill to be completed.          Additional Information About Your Visit        [employer]os[employer]ikia Information     [email or web address] gives you secure access to your electronic health record. If you see a primary care provider, you can also send messages to your care team and make appointments. If you have questions, please call your primary care clinic.  If you do not have a primary care provider, please call [phone] and they will assist you.        Care EveryWhere ID     This is your Care EveryWhere ID. This could be used by other organizations to access your [facility] medical records  [id number]         Blood Pressure from Last 3 Encounters:   06/06/17 123/71   04/11/17 116/70   12/17/15 116/69    Weight from Last 3 Encounters:   06/06/17 78 kg (172 lb)   04/11/17 78 kg (172 lb)   12/17/15 77.6 kg (171 lb)              We Performed the Following     EYE EXAM (SIMPLE-NONBILLABLE)     REFRACTION        Primary Care Provider Office Phone # Fax #    Federico Salas [phone]       [address] [address]        Equal Access to Services     [facility] : Hadii aad ku hadasho Soomaali, waaxda luqadaha, qaybta kaalmada adeegyada, jerald santoro . So [facility] [phone].    ATENCIÓN: Si habla español, tiene a cai disposición servicios gratuitos de asistencia lingüística. Llame al [phone].    We comply with applicable federal civil rights laws and [place] laws. We do not discriminate on the basis of race, color, national  origin, age, disability sex, sexual orientation or gender identity.            Thank you!     Thank you for choosing [facility]  for your care. Our goal is always to provide you with excellent care. Hearing back from our patients is one way we can continue to improve our services. Please take a few minutes to complete the written survey that you may receive in the mail after your visit with us. Thank you!             Your Updated Medication List - Protect others around you: Learn how to safely use, store and throw away your medicines at www.disposemymeds.org.          This list is accurate as of: 9/27/17 10:18 AM.  Always use your most recent med list.                   Brand Name Dispense Instructions for use Diagnosis    cyclobenzaprine 10 MG tablet    FLEXERIL    20 tablet    Take 1 tablet (10 mg) by mouth 3 times daily as needed for muscle spasms    Back pain       sildenafil 20 MG tablet    REVATIO    20 tablet    Take 1-4 tabs prior to intercourse as needed    Erectile dysfunction, unspecified erectile dysfunction type       triprolidine-pseudoePHEDrine 2.5-60 MG Tabs per tablet    APRODINE    100 tablet    Take 1 tablet by mouth every 6 hours as needed for allergies    Allergic rhinitis, unspecified allergic rhinitis trigger, unspecified rhinitis seasonality       valACYclovir 1000 mg tablet    VALTREX    4 tablet    2 grams now, then 12 hours later, as needed for cold sores    Herpes labialis

## 2017-09-27 NOTE — PATIENT INSTRUCTIONS
A final glasses prescription was given.  Allow time for adaptation.  The glasses may cause dizziness and affect depth perception for awhile.    Monitor cataracts by having yearly exams.  Wear sunglasses when outside.    A posterior vitreous detachment is nothing to worry about.  You have a jelly like substance that fills the inside of the eye, as you get older, that gel shrinks a little and when it shrinks, it pulls away from the back of the eye.  When it pulls away you can see a floater, as time goes on, the floater may shrink down out of your line of sight and you won't notice it so often.  There is a little greater risk of developing a retinal detachment since there's nothing pressing against the retina, so if you start to notice flashes of light or sudden vision changes, return to the clinic as soon as possible, otherwise return as needed.    Return to clinic 1 year for Comprehensive Vision Exam      Rubi Thomas O.D  [facility] [place]  [address] [place] [address]. [place]  [address]    [phone]

## 2017-10-17 DIAGNOSIS — B00.1 HERPES LABIALIS: ICD-10-CM

## 2017-10-17 RX ORDER — VALACYCLOVIR HYDROCHLORIDE 1 G/1
TABLET, FILM COATED ORAL
Qty: 4 TABLET | Refills: 10 | Status: SHIPPED | OUTPATIENT
Start: 2017-10-17 | End: 2019-02-20

## 2017-10-17 NOTE — TELEPHONE ENCOUNTER
Routing refill request to provider for review/approval because:  Labs not current:  Creatinine  Radha Garcia, LIUDMILA [facility] Triage.

## 2017-10-17 NOTE — TELEPHONE ENCOUNTER
valACYclovir (VALTREX) 1000 mg tablet     Last Written Prescription Date: 9/15/16  Last Fill Quantity: 4, # refills: 10  Last Office Visit with G, P or [facility] prescribing provider: 6/6/17        No results found for: CR

## 2018-03-07 DIAGNOSIS — N52.9 ERECTILE DYSFUNCTION, UNSPECIFIED ERECTILE DYSFUNCTION TYPE: ICD-10-CM

## 2018-03-07 PROBLEM — G89.29 CHRONIC RIGHT-SIDED LOW BACK PAIN WITHOUT SCIATICA: Status: RESOLVED | Noted: 2017-06-27 | Resolved: 2018-03-07

## 2018-03-07 PROBLEM — M54.50 CHRONIC RIGHT-SIDED LOW BACK PAIN WITHOUT SCIATICA: Status: RESOLVED | Noted: 2017-06-27 | Resolved: 2018-03-07

## 2018-03-07 RX ORDER — SILDENAFIL CITRATE 20 MG/1
TABLET ORAL
Qty: 20 TABLET | Refills: 5 | Status: SHIPPED | OUTPATIENT
Start: 2018-03-07 | End: 2019-04-23

## 2018-03-07 NOTE — PROGRESS NOTES
Subjective:  HPI                    Objective:  System    Physical Exam    General     ROS    Assessment/Plan:    DISCHARGE REPORT    Progress reporting period is from 6/27/2017 to 8/21/2017.     SUBJECTIVE  Subjective: Had an increase in pain due to lifting/ twist about 1.5 weeks ago.     Current Pain level: 2/10   Initial Pain level: 1/10   Changes in function: No changes noted in function since last SOAP note   Adverse reactions: Activity:;   , Adverse reaction activity: lift/ twist increased sx   Patient has failed to return to therapy so current objective findings are unknown.  The subjective and objective information are from the last SOAP note on this patient.    OBJECTIVE  Objective: Very careful with plinth transfers      ASSESSMENT/PLAN  Updated problem list and treatment plan:Diagnosis 1:  R LBP  Pain -  manual therapy, self management, education, directional preference exercise and home program  Decreased ROM/flexibility - manual therapy and therapeutic exercise  Decreased strength - therapeutic exercise and therapeutic activities  Decreased function - therapeutic activities      STG/LTGs have been met or progress has been made towards goals:  Yes (See Goal flow sheet completed today.)  Assessment of Progress: The patient has not returned to therapy. Current status is unknown.  Patient is meeting short term goals and is progressing towards long term goals.  Self Management Plans:  Patient has been instructed in a home treatment program.    Micky continues to require the following intervention to meet STG and LTG's: PT intervention is no longer required to meet STG/LTG.  The patient failed to complete scheduled/ordered appointments so current information is unknown.  We will discharge this patient from PT.    Recommendations:  Cancelled last visit and did not reschedule.  Discharge pt from PT.      Please refer to the daily flowsheet for treatment today, total treatment time and time spent performing 1:1  timed codes.

## 2018-03-07 NOTE — TELEPHONE ENCOUNTER
"Requested Prescriptions   Pending Prescriptions Disp Refills     sildenafil (REVATIO) 20 MG tablet [Pharmacy Med Name: SILDENAFIL CITRATE 20MG TABS] 20 tablet 5    Last Written Prescription Date:  3/17/17  Last Fill Quantity: 20,  # refills: 5   Last office visit: 6/6/2017 with prescribing provider:     Future Office Visit:     Sig: TAKE ONE TO FOUR TABLETS BY MOUTH PRIOR TO INTERCOURSE AS NEEDED    Erectile Dysfuction Protocol Passed    3/7/2018  4:07 PM       Passed - Absence of nitrates on medication list       Passed - Absence of Alpha Blockers on Med list       Passed - Recent (12 mo) or future (30 days) visit within the authorizing provider's specialty    Patient had office visit in the last year or has a visit in the next 30 days with authorizing provider.  See \"Patient Info\" tab in inbasket, or \"Choose Columns\" in Meds & Orders section of the refill encounter.            Passed - Patient is age 18 or older          "

## 2018-03-08 NOTE — TELEPHONE ENCOUNTER
Prescription approved per [facility] Refill Protocol.  Labs WNL except trigs a little high.  No history of cardiac issues.    Katerin Flores RN  [facility]

## 2018-08-23 ENCOUNTER — OFFICE VISIT (OUTPATIENT)
Dept: OPHTHALMOLOGY | Facility: CLINIC | Age: 63
End: 2018-08-23
Payer: COMMERCIAL

## 2018-08-23 DIAGNOSIS — H43.813 POSTERIOR VITREOUS DETACHMENT OF BOTH EYES: Primary | ICD-10-CM

## 2018-08-23 PROCEDURE — 92002 INTRM OPH EXAM NEW PATIENT: CPT | Performed by: OPHTHALMOLOGY

## 2018-08-23 PROCEDURE — 92134 CPTRZ OPH DX IMG PST SGM RTA: CPT | Performed by: OPHTHALMOLOGY

## 2018-08-23 ASSESSMENT — SLIT LAMP EXAM - LIDS
COMMENTS: NORMAL
COMMENTS: NORMAL

## 2018-08-23 ASSESSMENT — VISUAL ACUITY
OS_CC: 20/30
METHOD: SNELLEN - LINEAR
OD_CC: 20/20
CORRECTION_TYPE: GLASSES

## 2018-08-23 ASSESSMENT — TONOMETRY
OS_IOP_MMHG: 14
OD_IOP_MMHG: 14
IOP_METHOD: APPLANATION

## 2018-08-23 ASSESSMENT — EXTERNAL EXAM - LEFT EYE: OS_EXAM: NORMAL

## 2018-08-23 ASSESSMENT — CUP TO DISC RATIO: OS_RATIO: 0.3

## 2018-08-23 ASSESSMENT — EXTERNAL EXAM - RIGHT EYE: OD_EXAM: NORMAL

## 2018-08-23 NOTE — PROGRESS NOTES
Current Eye Medications:  no     Subjective:  Flashing lights left eye  Pt has noticed intermittent flashing left eye for about the past month, pt states 2 wks ago noticed vision left eye was like looking through a brown haze, this haze has stayed much the same in appearance.  Pt diagnosed with a PVD right eye about 1 year ago that created no other eye problems.     Objective:  See Ophthalmology Exam.       Assessment:  Acute posterior vitreous detachment left eye.  Normal baseline retinal OCT.      Plan:  Signs and symptoms of retinal detachment (shower of black floaters, frequent flashing even during day, curtain over part of visual field) discussed.    Jerardo Orta M.D.  [phone]

## 2018-08-23 NOTE — LETTER
8/23/2018         RE: Micky Topete  [address] [facility] [address]        Dear Colleague,    Thank you for referring your patient, Micky Topete, to the [facility]. Please see a copy of my visit note below.     Current Eye Medications:  no     Subjective:  Flashing lights left eye  Pt has noticed intermittent flashing left eye for about the past month, pt states 2 wks ago noticed vision left eye was like looking through a brown haze, this haze has stayed much the same in appearance.  Pt diagnosed with a PVD right eye about 1 year ago that created no other eye problems.     Objective:  See Ophthalmology Exam.       Assessment:  Acute posterior vitreous detachment left eye.  Normal baseline retinal OCT.      Plan:  Signs and symptoms of retinal detachment (shower of black floaters, frequent flashing even during day, curtain over part of visual field) discussed.    Jerardo Orta M.D.  [phone]           Again, thank you for allowing me to participate in the care of your patient.        Sincerely,        Jerardo Orta MD

## 2018-08-23 NOTE — MR AVS SNAPSHOT
After Visit Summary   8/23/2018    Micky Topete    MRN: [id number]           Patient Information     Date Of Birth          1955        Visit Information        Provider Department      8/23/2018 2:45 PM Jerardo Orta MD [facility]        Today's Diagnoses     PVD (posterior vitreous detachment), right eye    -  1      Care Instructions    Signs and symptoms of retinal detachment (shower of black floaters, frequent flashing even during day, curtain over part of visual field) discussed.    Jerardo Orta M.D.  [phone]            Follow-ups after your visit        Who to contact     If you have questions or need follow up information about today's clinic visit or your schedule please contact [facility] directly at [phone].  Normal or non-critical lab and imaging results will be communicated to you by [employer], letter or phone within 4 business days after the clinic has received the results. If you do not hear from us within 7 days, please contact the clinic through [employer] or phone. If you have a critical or abnormal lab result, we will notify you by phone as soon as possible.  Submit refill requests through import2 or call your pharmacy and they will forward the refill request to us. Please allow 3 business days for your refill to be completed.          Additional Information About Your Visit        MyChart Information     import2 gives you secure access to your electronic health record. If you see a primary care provider, you can also send messages to your care team and make appointments. If you have questions, please call your primary care clinic.  If you do not have a primary care provider, please call [phone] and they will assist you.        Care EveryWhere ID     This is your Care EveryWhere ID. This could be used by other organizations to access your [place] medical records  [id number]         Blood Pressure from Last 3 Encounters:    06/06/17 123/71   04/11/17 116/70   12/17/15 116/69    Weight from Last 3 Encounters:   06/06/17 78 kg (172 lb)   04/11/17 78 kg (172 lb)   12/17/15 77.6 kg (171 lb)              Today, you had the following     No orders found for display       Primary Care Provider Office Phone # Fax #    Federico Salas [phone]       [address] [address]        Equal Access to Services     [facility] : Hadii aad ku hadasho Soomaali, waaxda luqadaha, qaybta kaalmada adeegyada, waxmartina shahin hayaan melba santoro . So [facility] [phone].    ATENCIÓN: Si habla español, tiene a cai disposición servicios gratuitos de asistencia lingüística. Ll[facility] [phone].    We comply with applicable federal civil rights laws and [place] laws. We do not discriminate on the basis of race, color, national origin, age, disability, sex, sexual orientation, or gender identity.            Thank you!     Thank you for choosing [facility]  for your care. Our goal is always to provide you with excellent care. Hearing back from our patients is one way we can continue to improve our services. Please take a few minutes to complete the written survey that you may receive in the mail after your visit with us. Thank you!             Your Updated Medication List - Protect others around you: Learn how to safely use, store and throw away your medicines at www.disposemymeds.org.          This list is accurate as of 8/23/18  3:59 PM.  Always use your most recent med list.                   Brand Name Dispense Instructions for use Diagnosis    cyclobenzaprine 10 MG tablet    FLEXERIL    20 tablet    Take 1 tablet (10 mg) by mouth 3 times daily as needed for muscle spasms    Back pain       sildenafil 20 MG tablet    REVATIO    20 tablet    TAKE ONE TO FOUR TABLETS BY MOUTH PRIOR TO INTERCOURSE AS NEEDED    Erectile dysfunction, unspecified erectile dysfunction type        triprolidine-pseudoePHEDrine 2.5-60 MG Tabs per tablet    APRODINE    100 tablet    Take 1 tablet by mouth every 6 hours as needed for allergies    Allergic rhinitis, unspecified allergic rhinitis trigger, unspecified rhinitis seasonality       valACYclovir 1000 mg tablet    VALTREX    4 tablet    TAKE 2 TABLETS BY MOUTH NOW THEN 12 HOURS LATER AS NEEDED FOR COLD SORES    Herpes labialis

## 2018-08-23 NOTE — PATIENT INSTRUCTIONS
Signs and symptoms of retinal detachment (shower of black floaters, frequent flashing even during day, curtain over part of visual field) discussed.    Jerardo Orta M.D.  [phone]

## 2018-09-17 PROBLEM — H43.811 PVD (POSTERIOR VITREOUS DETACHMENT), RIGHT EYE: Status: RESOLVED | Noted: 2017-09-27 | Resolved: 2018-09-17

## 2018-09-17 PROBLEM — H43.813 POSTERIOR VITREOUS DETACHMENT OF BOTH EYES: Status: ACTIVE | Noted: 2018-09-17

## 2018-10-11 ENCOUNTER — MYC MEDICAL ADVICE (OUTPATIENT)
Dept: FAMILY MEDICINE | Facility: CLINIC | Age: 63
End: 2018-10-11

## 2018-10-11 NOTE — TELEPHONE ENCOUNTER
Routed patient's mychart concern to PCP to respond.  Patient was last seen by Dr. Salas 6/6/17.    Leelee Ricardo RN  [facility]

## 2018-10-21 ENCOUNTER — E-VISIT (OUTPATIENT)
Dept: FAMILY MEDICINE | Facility: CLINIC | Age: 63
End: 2018-10-21
Payer: COMMERCIAL

## 2018-10-21 DIAGNOSIS — A09 TRAVELER'S DIARRHEA: Primary | ICD-10-CM

## 2018-10-21 PROCEDURE — 99444 ZZC PHYSICIAN ONLINE EVALUATION & MANAGEMENT SERVICE: CPT | Performed by: FAMILY MEDICINE

## 2018-10-22 RX ORDER — CIPROFLOXACIN 500 MG/1
500 TABLET, FILM COATED ORAL 2 TIMES DAILY
Qty: 6 TABLET | Refills: 0 | Status: SHIPPED | OUTPATIENT
Start: 2018-10-22 | End: 2018-12-14

## 2018-12-14 ENCOUNTER — OFFICE VISIT (OUTPATIENT)
Dept: FAMILY MEDICINE | Facility: CLINIC | Age: 63
End: 2018-12-14
Payer: COMMERCIAL

## 2018-12-14 VITALS
SYSTOLIC BLOOD PRESSURE: 120 MMHG | BODY MASS INDEX: 24.24 KG/M2 | HEART RATE: 88 BPM | OXYGEN SATURATION: 97 % | DIASTOLIC BLOOD PRESSURE: 74 MMHG | TEMPERATURE: 97.7 F | HEIGHT: 72 IN | WEIGHT: 179 LBS

## 2018-12-14 DIAGNOSIS — M54.12 CERVICAL RADICULOPATHY: Primary | ICD-10-CM

## 2018-12-14 PROCEDURE — 99213 OFFICE O/P EST LOW 20 MIN: CPT | Performed by: FAMILY MEDICINE

## 2018-12-14 RX ORDER — METHYLPREDNISOLONE 4 MG
TABLET, DOSE PACK ORAL
Qty: 21 TABLET | Refills: 0 | Status: SHIPPED | OUTPATIENT
Start: 2018-12-14 | End: 2019-04-01

## 2018-12-14 ASSESSMENT — MIFFLIN-ST. JEOR: SCORE: 1644.45

## 2018-12-14 ASSESSMENT — PAIN SCALES - GENERAL: PAINLEVEL: MODERATE PAIN (4)

## 2018-12-14 NOTE — PROGRESS NOTES
SUBJECTIVE:   Micky Topete is a 63 year old male who presents to clinic today for the following health issues:        Neck Pain  Onset: 3 weeks    Description:   Location: neck   Radiation: into the left neck    Intensity: moderate    Progression of Symptoms:  improving and worsening    Accompanying Signs & Symptoms:  Burning, prickly sensation (paresthesias) in arm(s): YES [place] aches to left arm  Numbness in arm(s): YES  Weakness in arm(s):  YES- a little   Fever: no   Headache: no   Nausea and/or vomiting: no     History:   Trauma: YES- historical from using phone on side of the neck  Previous neck pain: YES  Previous surgery or injections: no   Previous Imaging (MRI,X ray): no     Precipitating factors:   Does movement increase the pain:  YES    Alleviating factors:  Therapy, chiropractor,  flexeril at bedtime, cold pack, heatpack    Therapies Tried and outcome:  Therapy help a little and able to sleep better    He has had problems with his neck like this in past years.  He is getting some discomfort down his left arm into the fourth and fifth fingers.  He does not feel like he has weakness of the left arm.  The chiropractic treatments seem to be helping him.  He has had 4 or 5 of these treatments in the last couple of weeks.    Problem list and histories reviewed & adjusted, as indicated.  Additional history: as documented    Patient Active Problem List   Diagnosis     Hyperlipidemia with target LDL less than 130     Advanced directives, counseling/discussion     History of basal cell cancer     Cataract of both eyes     Actinic keratoses     Erectile dysfunction, unspecified erectile dysfunction type     Right-sided low back pain without sciatica, unspecified chronicity     Posterior vitreous detachment of both eyes     Past Surgical History:   Procedure Laterality Date     APPENDECTOMY       BIOPSY      Left forearm, basal cell     COLONOSCOPY      Age 51     HEAD & NECK SURGERY      5th & 7th cervical  "disk bulge     SOFT TISSUE SURGERY      Rt shoulder bursitis, ligament stretch     VASECTOMY         Social History     Tobacco Use     Smoking status: Never Smoker     Smokeless tobacco: Never Used   Substance Use Topics     Alcohol use: Yes     Family History   Problem Relation Age of Onset     Diabetes Mother      Eye Disorder Mother      Glaucoma Mother      Macular Degeneration Mother      Cerebrovascular Disease Mother      Hypertension Father      Heart Disease Father      Cancer - colorectal Paternal Grandfather          Current Outpatient Medications   Medication Sig Dispense Refill     cyclobenzaprine (FLEXERIL) 10 MG tablet Take 1 tablet (10 mg) by mouth 3 times daily as needed for muscle spasms 20 tablet 1       0     valACYclovir (VALTREX) 1000 mg tablet TAKE 2 TABLETS BY MOUTH NOW THEN 12 HOURS LATER AS NEEDED FOR COLD SORES 4 tablet 10     ciprofloxacin (CIPRO) 500 MG tablet Take 1 tablet (500 mg) by mouth 2 times daily (Patient not taking: Reported on 12/14/2018) 6 tablet 0     sildenafil (REVATIO) 20 MG tablet TAKE ONE TO FOUR TABLETS BY MOUTH PRIOR TO INTERCOURSE AS NEEDED (Patient not taking: Reported on 12/14/2018) 20 tablet 5     triprolidine-pseudoePHEDrine (APRODINE) 2.5-60 MG TABS Take 1 tablet by mouth every 6 hours as needed for allergies (Patient not taking: Reported on 12/14/2018) 100 tablet 1     Allergies   Allergen Reactions     Sulfa Drugs        Reviewed and updated as needed this visit by clinical staff  Tobacco  Allergies  Meds  Med Hx  Surg Hx  Fam Hx  Soc Hx      Reviewed and updated as needed this visit by Provider         ROS:  Constitutional, HEENT, cardiovascular, pulmonary, gi and gu systems are negative, except as otherwise noted.    OBJECTIVE:     /74 (BP Location: Right arm, Patient Position: Chair, Cuff Size: Adult Regular)   Pulse 88   Temp 97.7  F (36.5  C) (Oral)   Ht 1.82 m (5' 11.65\")   Wt 81.2 kg (179 lb)   SpO2 97%   BMI 24.51 kg/m    Body mass " "index is 24.51 kg/m .  GENERAL: healthy, alert and no distress  NECK: He has mild discomfort to palpation at the base of his neck on the left side extending towards her left shoulder.  Fairly good range of motion with the neck.  MS: He has good range of motion of the left shoulder and left arm without any obvious restrictions or weakness.  NEURO: Normal strength and tone and sensation in the left upper extremity, although certain movements of his neck and left shoulder do cause some sensations going down his left arm into the fourth and fifth fingers.    Diagnostic Test Results:  none     ASSESSMENT/PLAN:         ICD-10-CM    1. Cervical radiculopathy M54.12 methylPREDNISolone (MEDROL DOSEPAK) 4 MG tablet therapy pack     He appears to have a \"pinched nerve\" likely involving C8  We discussed conservative therapy for this including ongoing chiropractic treatment  Discussed possible physical therapy for traction as well  I will prescribe a Medrol Dosepak for him  He could use Flexeril at night to help with sleep  I asked him to call me a recheck in the next week or 2 if he has not had significant improvement  Discussed possible role for a cervical MRI in the future if he is not getting better with conservative treatment    Federico Salas MD  [facility]  "

## 2019-02-20 DIAGNOSIS — B00.1 HERPES LABIALIS: ICD-10-CM

## 2019-02-20 RX ORDER — VALACYCLOVIR HYDROCHLORIDE 1 G/1
TABLET, FILM COATED ORAL
Qty: 8 TABLET | Refills: 5 | Status: SHIPPED | OUTPATIENT
Start: 2019-02-20 | End: 2019-07-11

## 2019-02-20 NOTE — TELEPHONE ENCOUNTER
"Requested Prescriptions   Pending Prescriptions Disp Refills     valACYclovir (VALTREX) 1000 mg tablet [Pharmacy Med Name: VALACYCLOVIR HCL 1GM TABS] 4 tablet 10     Sig: TAKE 2 TABLETS BY MOUTH NOW THEN 12 HOURS LATER AS NEEDED FOR COLD SORES    Antivirals for Herpes Protocol Failed - 2/20/2019  4:54 PM       Failed - Normal serum creatinine on file in past 12 months    No lab results found.         Passed - Patient is age 12 or older       Passed - Recent (12 mo) or future (30 days) visit within the authorizing provider's specialty    Patient had office visit in the last 12 months or has a visit in the next 30 days with authorizing provider or within the authorizing provider's specialty.  See \"Patient Info\" tab in inbasket, or \"Choose Columns\" in Meds & Orders section of the refill encounter.             Passed - Medication is active on med list        Routing refill request to provider for review/approval because:  Labs not current:  Creatinine     Marta Watt RN    "

## 2019-03-22 ENCOUNTER — OFFICE VISIT (OUTPATIENT)
Dept: FAMILY MEDICINE | Facility: CLINIC | Age: 64
End: 2019-03-22
Payer: COMMERCIAL

## 2019-03-22 VITALS
DIASTOLIC BLOOD PRESSURE: 68 MMHG | SYSTOLIC BLOOD PRESSURE: 112 MMHG | HEART RATE: 80 BPM | OXYGEN SATURATION: 97 % | TEMPERATURE: 97.3 F

## 2019-03-22 DIAGNOSIS — R82.90 NONSPECIFIC FINDING ON EXAMINATION OF URINE: ICD-10-CM

## 2019-03-22 DIAGNOSIS — R35.0 URINARY FREQUENCY: Primary | ICD-10-CM

## 2019-03-22 LAB
ALBUMIN UR-MCNC: ABNORMAL MG/DL
ALBUMIN UR-MCNC: NORMAL MG/DL
APPEARANCE UR: CLEAR
APPEARANCE UR: NORMAL
BACTERIA #/AREA URNS HPF: ABNORMAL /HPF
BACTERIA #/AREA URNS HPF: NORMAL /HPF
BILIRUB UR QL STRIP: NEGATIVE
BILIRUB UR QL STRIP: NORMAL
COLOR UR AUTO: NORMAL
COLOR UR AUTO: YELLOW
GLUCOSE UR STRIP-MCNC: NEGATIVE MG/DL
GLUCOSE UR STRIP-MCNC: NORMAL MG/DL
HGB UR QL STRIP: NEGATIVE
HGB UR QL STRIP: NORMAL
HYALINE CASTS #/AREA URNS LPF: ABNORMAL /LPF
KETONES UR STRIP-MCNC: NEGATIVE MG/DL
KETONES UR STRIP-MCNC: NORMAL MG/DL
LEUKOCYTE ESTERASE UR QL STRIP: ABNORMAL
LEUKOCYTE ESTERASE UR QL STRIP: NORMAL
MUCOUS THREADS #/AREA URNS LPF: PRESENT /LPF
NITRATE UR QL: NEGATIVE
NITRATE UR QL: NORMAL
NON-SQ EPI CELLS #/AREA URNS LPF: ABNORMAL /LPF
NON-SQ EPI CELLS #/AREA URNS LPF: NORMAL /LPF
PH UR STRIP: 6 PH (ref 5–7)
PH UR STRIP: NORMAL PH (ref 5–7)
RBC #/AREA URNS AUTO: ABNORMAL /HPF
RBC #/AREA URNS AUTO: NORMAL /HPF (ref 0–2)
SOURCE: ABNORMAL
SOURCE: NORMAL
SP GR UR STRIP: 1.02 (ref 1–1.03)
SP GR UR STRIP: NORMAL (ref 1–1.03)
UROBILINOGEN UR STRIP-ACNC: 0.2 EU/DL (ref 0.2–1)
UROBILINOGEN UR STRIP-ACNC: NORMAL EU/DL (ref 0.2–1)
WBC #/AREA URNS AUTO: ABNORMAL /HPF
WBC #/AREA URNS AUTO: NORMAL /HPF
WBC CLUMPS #/AREA URNS HPF: PRESENT /HPF

## 2019-03-22 PROCEDURE — 87086 URINE CULTURE/COLONY COUNT: CPT | Performed by: FAMILY MEDICINE

## 2019-03-22 PROCEDURE — 99213 OFFICE O/P EST LOW 20 MIN: CPT | Performed by: FAMILY MEDICINE

## 2019-03-22 PROCEDURE — 81001 URINALYSIS AUTO W/SCOPE: CPT | Performed by: FAMILY MEDICINE

## 2019-03-22 PROCEDURE — 87186 SC STD MICRODIL/AGAR DIL: CPT | Performed by: FAMILY MEDICINE

## 2019-03-22 PROCEDURE — 87088 URINE BACTERIA CULTURE: CPT | Performed by: FAMILY MEDICINE

## 2019-03-22 RX ORDER — CIPROFLOXACIN 500 MG/1
500 TABLET, FILM COATED ORAL 2 TIMES DAILY
Qty: 10 TABLET | Refills: 0 | Status: SHIPPED | OUTPATIENT
Start: 2019-03-22 | End: 2019-03-26 | Stop reason: ALTCHOICE

## 2019-03-22 ASSESSMENT — ANXIETY QUESTIONNAIRES
7. FEELING AFRAID AS IF SOMETHING AWFUL MIGHT HAPPEN: NOT AT ALL
2. NOT BEING ABLE TO STOP OR CONTROL WORRYING: NOT AT ALL
3. WORRYING TOO MUCH ABOUT DIFFERENT THINGS: NOT AT ALL
1. FEELING NERVOUS, ANXIOUS, OR ON EDGE: NOT AT ALL
6. BECOMING EASILY ANNOYED OR IRRITABLE: NOT AT ALL
GAD7 TOTAL SCORE: 0
5. BEING SO RESTLESS THAT IT IS HARD TO SIT STILL: NOT AT ALL
IF YOU CHECKED OFF ANY PROBLEMS ON THIS QUESTIONNAIRE, HOW DIFFICULT HAVE THESE PROBLEMS MADE IT FOR YOU TO DO YOUR WORK, TAKE CARE OF THINGS AT HOME, OR GET ALONG WITH OTHER PEOPLE: NOT DIFFICULT AT ALL

## 2019-03-22 ASSESSMENT — PAIN SCALES - GENERAL: PAINLEVEL: MILD PAIN (3)

## 2019-03-22 ASSESSMENT — PATIENT HEALTH QUESTIONNAIRE - PHQ9
5. POOR APPETITE OR OVEREATING: NOT AT ALL
SUM OF ALL RESPONSES TO PHQ QUESTIONS 1-9: 0

## 2019-03-22 NOTE — PROGRESS NOTES
SUBJECTIVE:   Micky Topete is a 63 year old male who presents to clinic today for the following health issues:  SUBJECTIVE: Micky Topete is a 63 year old male who complains of urinary frequency, urgency and dysuria x 3 days, without flank pain, fever, chills, or abnormal vaginal discharge or bleeding.     OBJECTIVE: Appears well, in no apparent distress.  Vital signs are normal. The abdomen is soft without tenderness, guarding, mass, rebound or organomegaly. No CVA tenderness or inguinal adenopathy noted. Urine dipstick shows positive for WBC's, positive for RBC's, positive for leukocytes and positive for bacturia.  Micro exam:  WBC's per HPF and 2-5 RBC's per HPF.     ASSESSMENT: UTI uncomplicated without evidence of pyelonephritis  (R35.0) Urinary frequency  (primary encounter diagnosis)    Plan: UA with Microscopic reflex to Culture, UA         reflex to Microscopic and Culture, Urine         Microscopic     (R82.90) Nonspecific finding on examination of urine    Plan: Urine Culture Aerobic Bacterial, ciprofloxacin         (CIPRO) 500 MG tablet          PLAN: Treatment per orders - also push fluids, may use Pyridium OTC prn. Call or return to clinic prn if these symptoms worsen or fail to improve as anticipated.      Genitourinary - Male  Onset: 3 days    Description:   Dysuria (painful urination): YES  Hematuria (blood in urine): no   Frequency: YES- more  Are you urinating at night : YES  Hesitancy (delay in urine): no   Retention (unable to empty): YES  Decrease in urinary flow: YES  Incontinence: no     Progression of Symptoms:  worsening    Accompanying Signs & Symptoms:  Fever: no   Back/Flank pain: no   Urethral discharge: no   Testicle lumps/masses/pain: no   Nausea and/or vomiting: no   Abdominal pain: no     History:   History of frequent UTI's: no   History of kidney stones: no   History of hernias: no   Personal or Family history of Prostate problems: no  Sexually active:  YES    Precipitating factors:   none    Alleviating factors:  none  Moncho Amaya MD

## 2019-03-23 ASSESSMENT — ANXIETY QUESTIONNAIRES: GAD7 TOTAL SCORE: 0

## 2019-03-24 LAB
BACTERIA SPEC CULT: ABNORMAL
SPECIMEN SOURCE: ABNORMAL

## 2019-03-26 DIAGNOSIS — N39.0 URINARY TRACT INFECTION WITHOUT HEMATURIA, SITE UNSPECIFIED: Primary | ICD-10-CM

## 2019-03-26 RX ORDER — CEPHALEXIN 500 MG/1
500 CAPSULE ORAL 3 TIMES DAILY
Qty: 21 CAPSULE | Refills: 0 | Status: SHIPPED | OUTPATIENT
Start: 2019-03-26 | End: 2019-06-10

## 2019-04-01 ENCOUNTER — OFFICE VISIT (OUTPATIENT)
Dept: FAMILY MEDICINE | Facility: CLINIC | Age: 64
End: 2019-04-01
Payer: COMMERCIAL

## 2019-04-01 VITALS
DIASTOLIC BLOOD PRESSURE: 72 MMHG | HEIGHT: 72 IN | WEIGHT: 177 LBS | TEMPERATURE: 98.4 F | SYSTOLIC BLOOD PRESSURE: 115 MMHG | BODY MASS INDEX: 23.98 KG/M2 | OXYGEN SATURATION: 97 % | HEART RATE: 84 BPM

## 2019-04-01 DIAGNOSIS — N30.00 ACUTE CYSTITIS WITHOUT HEMATURIA: Primary | ICD-10-CM

## 2019-04-01 LAB
ALBUMIN UR-MCNC: NEGATIVE MG/DL
APPEARANCE UR: CLEAR
BILIRUB UR QL STRIP: NEGATIVE
COLOR UR AUTO: YELLOW
GLUCOSE UR STRIP-MCNC: NEGATIVE MG/DL
HGB UR QL STRIP: NEGATIVE
KETONES UR STRIP-MCNC: NEGATIVE MG/DL
LEUKOCYTE ESTERASE UR QL STRIP: NEGATIVE
NITRATE UR QL: NEGATIVE
PH UR STRIP: 5.5 PH (ref 5–7)
SOURCE: NORMAL
SP GR UR STRIP: >1.03 (ref 1–1.03)
UROBILINOGEN UR STRIP-ACNC: 0.2 EU/DL (ref 0.2–1)

## 2019-04-01 PROCEDURE — 81003 URINALYSIS AUTO W/O SCOPE: CPT | Performed by: FAMILY MEDICINE

## 2019-04-01 PROCEDURE — 99213 OFFICE O/P EST LOW 20 MIN: CPT | Performed by: FAMILY MEDICINE

## 2019-04-01 ASSESSMENT — MIFFLIN-ST. JEOR: SCORE: 1646

## 2019-04-01 NOTE — PROGRESS NOTES
SUBJECTIVE:   Micky Topete is a 63 year old male who presents to clinic today for the following health issues:      Follow up UTI. Still has a little discomfort. Pain is 1-2/10.    He was seen a week and a half ago by 1 of my partners for a UTI.  He was treated initially with Cipro but then it was changed to cephalexin.  He has 1 more pill left to go the cephalexin.  His symptoms have almost completely cleared, but he still has a little bit of urinary pressure/frequency compared to baseline.  He had fairly minimal urinary obstructive symptoms as of a month or 2 ago.  I see that he is due for an and probably also a PSA.    Problem list and histories reviewed & adjusted, as indicated.  Additional history: as documented    Patient Active Problem List   Diagnosis     Hyperlipidemia with target LDL less than 130     Advanced directives, counseling/discussion     History of basal cell cancer     Cataract of both eyes     Actinic keratoses     Erectile dysfunction, unspecified erectile dysfunction type     Right-sided low back pain without sciatica, unspecified chronicity     Posterior vitreous detachment of both eyes     Past Surgical History:   Procedure Laterality Date     APPENDECTOMY       BIOPSY      Left forearm, basal cell     COLONOSCOPY      Age 51     HEAD & NECK SURGERY      5th & 7th cervical disk bulge     SOFT TISSUE SURGERY      Rt shoulder bursitis, ligament stretch     VASECTOMY         Social History     Tobacco Use     Smoking status: Never Smoker     Smokeless tobacco: Never Used   Substance Use Topics     Alcohol use: Yes     Family History   Problem Relation Age of Onset     Diabetes Mother      Eye Disorder Mother      Glaucoma Mother      Macular Degeneration Mother      Cerebrovascular Disease Mother      Hypertension Father      Heart Disease Father      Cancer - colorectal Paternal Grandfather          Current Outpatient Medications   Medication Sig Dispense Refill     cephALEXin (KEFLEX)  "500 MG capsule Take 1 capsule (500 mg) by mouth 3 times daily for 7 days 21 capsule 0     sildenafil (REVATIO) 20 MG tablet TAKE ONE TO FOUR TABLETS BY MOUTH PRIOR TO INTERCOURSE AS NEEDED (Patient not taking: Reported on 12/14/2018) 20 tablet 5     triprolidine-pseudoePHEDrine (APRODINE) 2.5-60 MG TABS Take 1 tablet by mouth every 6 hours as needed for allergies (Patient not taking: Reported on 12/14/2018) 100 tablet 1     valACYclovir (VALTREX) 1000 mg tablet TAKE 2 TABLETS BY MOUTH NOW THEN 12 HOURS LATER AS NEEDED FOR COLD SORES (Patient not taking: Reported on 3/22/2019) 8 tablet 5     Allergies   Allergen Reactions     Sulfa Drugs        Reviewed and updated as needed this visit by clinical staff  Tobacco  Allergies  Meds  Med Hx  Surg Hx  Fam Hx  Soc Hx      Reviewed and updated as needed this visit by Provider         ROS:  Constitutional, HEENT, cardiovascular, pulmonary, gi and gu systems are negative, except as otherwise noted.    OBJECTIVE:     /72 (BP Location: Right arm, Patient Position: Chair, Cuff Size: Adult Regular)   Pulse 84   Temp 98.4  F (36.9  C) (Oral)   Ht 1.837 m (6' 0.32\")   Wt 80.3 kg (177 lb)   SpO2 97%   BMI 23.79 kg/m    Body mass index is 23.79 kg/m .  GENERAL: healthy, alert and no distress    Diagnostic Test Results:  Office Visit on 04/01/2019   Component Date Value Ref Range Status     Color Urine 04/01/2019 Yellow   Final     Appearance Urine 04/01/2019 Clear   Final     Glucose Urine 04/01/2019 Negative  NEG^Negative mg/dL Final     Bilirubin Urine 04/01/2019 Negative  NEG^Negative Final     Ketones Urine 04/01/2019 Negative  NEG^Negative mg/dL Final     Specific Gravity Urine 04/01/2019 >1.030  1.003 - 1.035 Final     Blood Urine 04/01/2019 Negative  NEG^Negative Final     pH Urine 04/01/2019 5.5  5.0 - 7.0 pH Final     Protein Albumin Urine 04/01/2019 Negative  NEG^Negative mg/dL Final     Urobilinogen Urine 04/01/2019 0.2  0.2 - 1.0 EU/dL Final     Nitrite " Urine 04/01/2019 Negative  NEG^Negative Final     Leukocyte Esterase Urine 04/01/2019 Negative  NEG^Negative Final     Source 04/01/2019 Midstream Urine   Final         ASSESSMENT/PLAN:         ICD-10-CM    1. Acute cystitis without hematuria N30.00 UA reflex to Microscopic and Culture     His urinalysis is now clear  His urinary tract infection appears to have been adequately treated  He will finish off the final cephalexin pill and then we will wait 2-3 weeks and have him return for an annual physical along with prostate check and fasting lab work including PSA    Federico Salas MD  [facility]

## 2019-04-20 ASSESSMENT — ENCOUNTER SYMPTOMS
MYALGIAS: 0
WEAKNESS: 0
NAUSEA: 0
PALPITATIONS: 0
HEADACHES: 0
HEARTBURN: 0
PARESTHESIAS: 0
SHORTNESS OF BREATH: 0
CONSTIPATION: 0
DIZZINESS: 0
DYSURIA: 0
FREQUENCY: 0
NERVOUS/ANXIOUS: 0
ABDOMINAL PAIN: 0
FEVER: 0
HEMATOCHEZIA: 0
JOINT SWELLING: 1
EYE PAIN: 0
COUGH: 0
DIARRHEA: 0
ARTHRALGIAS: 1
CHILLS: 0
SORE THROAT: 0
HEMATURIA: 0

## 2019-04-23 ENCOUNTER — OFFICE VISIT (OUTPATIENT)
Dept: FAMILY MEDICINE | Facility: CLINIC | Age: 64
End: 2019-04-23
Payer: COMMERCIAL

## 2019-04-23 VITALS
HEIGHT: 72 IN | HEART RATE: 60 BPM | BODY MASS INDEX: 23.98 KG/M2 | SYSTOLIC BLOOD PRESSURE: 143 MMHG | OXYGEN SATURATION: 99 % | DIASTOLIC BLOOD PRESSURE: 79 MMHG | TEMPERATURE: 97.7 F | WEIGHT: 177 LBS

## 2019-04-23 DIAGNOSIS — N52.9 ERECTILE DYSFUNCTION, UNSPECIFIED ERECTILE DYSFUNCTION TYPE: ICD-10-CM

## 2019-04-23 DIAGNOSIS — E78.5 HYPERLIPIDEMIA WITH TARGET LDL LESS THAN 130: ICD-10-CM

## 2019-04-23 DIAGNOSIS — Z23 NEED FOR TDAP VACCINATION: ICD-10-CM

## 2019-04-23 DIAGNOSIS — Z00.00 ROUTINE GENERAL MEDICAL EXAMINATION AT A HEALTH CARE FACILITY: Primary | ICD-10-CM

## 2019-04-23 DIAGNOSIS — Z85.828 HISTORY OF BASAL CELL CANCER: ICD-10-CM

## 2019-04-23 DIAGNOSIS — L98.9 SKIN LESION OF LEFT LEG: ICD-10-CM

## 2019-04-23 DIAGNOSIS — L57.0 ACTINIC KERATOSES: ICD-10-CM

## 2019-04-23 DIAGNOSIS — Z11.4 SCREENING FOR HIV (HUMAN IMMUNODEFICIENCY VIRUS): ICD-10-CM

## 2019-04-23 DIAGNOSIS — M54.2 NECK PAIN: ICD-10-CM

## 2019-04-23 LAB
CHOLEST SERPL-MCNC: 190 MG/DL
ERYTHROCYTE [DISTWIDTH] IN BLOOD BY AUTOMATED COUNT: 12.9 % (ref 10–15)
GLUCOSE SERPL-MCNC: 92 MG/DL (ref 70–99)
HCT VFR BLD AUTO: 43.8 % (ref 40–53)
HDLC SERPL-MCNC: 49 MG/DL
HGB BLD-MCNC: 14.8 G/DL (ref 13.3–17.7)
LDLC SERPL CALC-MCNC: 112 MG/DL
MCH RBC QN AUTO: 30 PG (ref 26.5–33)
MCHC RBC AUTO-ENTMCNC: 33.8 G/DL (ref 31.5–36.5)
MCV RBC AUTO: 89 FL (ref 78–100)
NONHDLC SERPL-MCNC: 141 MG/DL
PLATELET # BLD AUTO: 200 10E9/L (ref 150–450)
PSA SERPL-ACNC: 3.48 UG/L (ref 0–4)
RBC # BLD AUTO: 4.93 10E12/L (ref 4.4–5.9)
TRIGL SERPL-MCNC: 147 MG/DL
WBC # BLD AUTO: 7.4 10E9/L (ref 4–11)

## 2019-04-23 PROCEDURE — 99000 SPECIMEN HANDLING OFFICE-LAB: CPT | Performed by: FAMILY MEDICINE

## 2019-04-23 PROCEDURE — 85027 COMPLETE CBC AUTOMATED: CPT | Performed by: FAMILY MEDICINE

## 2019-04-23 PROCEDURE — 88305 TISSUE EXAM BY PATHOLOGIST: CPT | Performed by: FAMILY MEDICINE

## 2019-04-23 PROCEDURE — 17000 DESTRUCT PREMALG LESION: CPT | Mod: 59 | Performed by: FAMILY MEDICINE

## 2019-04-23 PROCEDURE — 11104 PUNCH BX SKIN SINGLE LESION: CPT | Performed by: FAMILY MEDICINE

## 2019-04-23 PROCEDURE — 87389 HIV-1 AG W/HIV-1&-2 AB AG IA: CPT | Performed by: FAMILY MEDICINE

## 2019-04-23 PROCEDURE — 99213 OFFICE O/P EST LOW 20 MIN: CPT | Mod: 25 | Performed by: FAMILY MEDICINE

## 2019-04-23 PROCEDURE — 36415 COLL VENOUS BLD VENIPUNCTURE: CPT | Performed by: FAMILY MEDICINE

## 2019-04-23 PROCEDURE — G0103 PSA SCREENING: HCPCS | Performed by: FAMILY MEDICINE

## 2019-04-23 PROCEDURE — 90715 TDAP VACCINE 7 YRS/> IM: CPT | Performed by: FAMILY MEDICINE

## 2019-04-23 PROCEDURE — 99396 PREV VISIT EST AGE 40-64: CPT | Mod: 25 | Performed by: FAMILY MEDICINE

## 2019-04-23 PROCEDURE — 17003 DESTRUCT PREMALG LES 2-14: CPT | Performed by: FAMILY MEDICINE

## 2019-04-23 PROCEDURE — 80061 LIPID PANEL: CPT | Performed by: FAMILY MEDICINE

## 2019-04-23 PROCEDURE — 90471 IMMUNIZATION ADMIN: CPT | Performed by: FAMILY MEDICINE

## 2019-04-23 PROCEDURE — 82947 ASSAY GLUCOSE BLOOD QUANT: CPT | Performed by: FAMILY MEDICINE

## 2019-04-23 RX ORDER — SILDENAFIL CITRATE 20 MG/1
TABLET ORAL
Qty: 20 TABLET | Refills: 5 | Status: SHIPPED | OUTPATIENT
Start: 2019-04-23 | End: 2020-05-07

## 2019-04-23 ASSESSMENT — ENCOUNTER SYMPTOMS
SHORTNESS OF BREATH: 0
WEAKNESS: 0
HEARTBURN: 0
DIARRHEA: 0
PALPITATIONS: 0
HEADACHES: 0
CHILLS: 0
CONSTIPATION: 0
NAUSEA: 0
FEVER: 0
MYALGIAS: 0
HEMATOCHEZIA: 0
NERVOUS/ANXIOUS: 0
PARESTHESIAS: 0
ARTHRALGIAS: 1
FREQUENCY: 0
COUGH: 0
DIZZINESS: 0
DYSURIA: 0
ABDOMINAL PAIN: 0
EYE PAIN: 0
JOINT SWELLING: 1
SORE THROAT: 0
HEMATURIA: 0

## 2019-04-23 ASSESSMENT — MIFFLIN-ST. JEOR: SCORE: 1646

## 2019-04-23 NOTE — NURSING NOTE
Screening Questionnaire for Adult Immunization    Are you sick today?   No   Do you have allergies to medications, food, a vaccine component or latex?   No   Have you ever had a serious reaction after receiving a vaccination?   No   Do you have a long-term health problem with heart disease, lung disease, asthma, kidney disease, metabolic disease (e.g. diabetes), anemia, or other blood disorder?   No   Do you have cancer, leukemia, HIV/AIDS, or any other immune system problem?   No   In the past 3 months, have you taken medications that affect  your immune system, such as prednisone, other steroids, or anticancer drugs; drugs for the treatment of rheumatoid arthritis, Crohn s disease, or psoriasis; or have you had radiation treatments?   No   Have you had a seizure, or a brain or other nervous system problem?   No   During the past year, have you received a transfusion of blood or blood     products, or been given immune (gamma) globulin or antiviral drug?   No   For women: Are you pregnant or is there a chance you could become        pregnant during the next month?   No   Have you received any vaccinations in the past 4 weeks?   No     Immunization questionnaire answers were all negative.        Per orders of Dr. Salas, injection of Tdap given by Paige Cheney. Patient instructed to remain in clinic for 15 minutes afterwards, and to report any adverse reaction to me immediately.  Prior to injection, verified patient identity using patient's name and date of birth.  Due to injection administration, patient instructed to remain in clinic for 15 minutes  afterwards, and to report any adverse reaction to me immediately.    Tdap    Drug Amount Wasted:  None.  Vial/Syringe: Syringe  Expiration Date:  See immunizations  Vaccine information supplied.         Screening performed by Paige Cheney on 4/23/2019 at 8:29 AM.

## 2019-04-23 NOTE — PROGRESS NOTES
SUBJECTIVE:   CC: Micky Topete is an 63 year old male who presents for a preventative health visit and follow-up on some baseline health items.     Healthy Habits:     Getting at least 3 servings of Calcium per day:  NO    Bi-annual eye exam:  Yes    Dental care twice a year:  Yes    Sleep apnea or symptoms of sleep apnea:  None    Diet:  Regular (no restrictions)    Frequency of exercise:  None    Taking medications regularly:  Yes    Medication side effects:  None    PHQ-2 Total Score: 0    Additional concerns today:  Yes    Other concerns:     Neck Pain  Duration of complaint: 5 days.  He has had some neck discomfort off and on a recent months.  Initially he had some radiation down his left arm into his hand, but more recently is just radiating up into the left shoulder and upper arm area.  He has been seeing a chiropractor and that seems to help.  He tried some traction yesterday.  That usually helps.    Spot on left thigh he would like checked.  He previously has had a basal cell skin cancer removed from his left arm.  He has a spot on his left side that has been there for about a year.  Its red and scaly.    He also has a few red scaly spots on his face.    Today's PHQ-2 Score:   PHQ-2 ( 1999 Pfizer) 4/20/2019   Q1: Little interest or pleasure in doing things 0   Q2: Feeling down, depressed or hopeless 0   PHQ-2 Score 0   Q1: Little interest or pleasure in doing things Not at all   Q2: Feeling down, depressed or hopeless Not at all   PHQ-2 Score 0       Abuse: Current or Past(Physical, Sexual or Emotional)- No  Do you feel safe in your environment? Yes    Social History     Tobacco Use     Smoking status: Never Smoker     Smokeless tobacco: Never Used   Substance Use Topics     Alcohol use: Yes         Alcohol Use 4/20/2019   Prescreen: >3 drinks/day or >7 drinks/week? No   Prescreen: >3 drinks/day or >7 drinks/week? -       Last PSA:   PSA   Date Value Ref Range Status   10/22/2009 0.77 0 - 4 ug/L Final        Reviewed orders with patient. Reviewed health maintenance and updated orders accordingly - Yes  Patient Active Problem List   Diagnosis     Hyperlipidemia with target LDL less than 130     Advanced directives, counseling/discussion     History of basal cell cancer     Cataract of both eyes     Actinic keratoses     Erectile dysfunction, unspecified erectile dysfunction type     Right-sided low back pain without sciatica, unspecified chronicity     Posterior vitreous detachment of both eyes     Past Surgical History:   Procedure Laterality Date     APPENDECTOMY       BIOPSY      Left forearm, basal cell     COLONOSCOPY      Age 51     HEAD & NECK SURGERY      5th & 7th cervical disk bulge     SOFT TISSUE SURGERY      Rt shoulder bursitis, ligament stretch     VASECTOMY         Social History     Tobacco Use     Smoking status: Never Smoker     Smokeless tobacco: Never Used   Substance Use Topics     Alcohol use: Yes     Family History   Problem Relation Age of Onset     Diabetes Mother      Eye Disorder Mother      Glaucoma Mother      Macular Degeneration Mother      Cerebrovascular Disease Mother      Hypertension Father      Heart Disease Father      Cancer - colorectal Paternal Grandfather          Current Outpatient Medications   Medication Sig Dispense Refill     sildenafil (REVATIO) 20 MG tablet TAKE ONE TO FOUR TABLETS BY MOUTH PRIOR TO INTERCOURSE AS NEEDED 20 tablet 5     triprolidine-pseudoePHEDrine (APRODINE) 2.5-60 MG TABS Take 1 tablet by mouth every 6 hours as needed for allergies (Patient not taking: Reported on 12/14/2018) 100 tablet 1     valACYclovir (VALTREX) 1000 mg tablet TAKE 2 TABLETS BY MOUTH NOW THEN 12 HOURS LATER AS NEEDED FOR COLD SORES (Patient not taking: Reported on 3/22/2019) 8 tablet 5     Allergies   Allergen Reactions     Sulfa Drugs        Reviewed and updated as needed this visit by clinical staff  Tobacco  Allergies  Meds  Med Hx  Surg Hx  Fam Hx  Soc Hx     "    Reviewed and updated as needed this visit by Provider            Review of Systems   Constitutional: Negative for chills and fever.   HENT: Negative for congestion, ear pain, hearing loss and sore throat.    Eyes: Negative for pain and visual disturbance.   Respiratory: Negative for cough and shortness of breath.    Cardiovascular: Negative for chest pain, palpitations and peripheral edema.   Gastrointestinal: Negative for abdominal pain, constipation, diarrhea, heartburn, hematochezia and nausea.   Genitourinary: Positive for impotence. Negative for discharge, dysuria, frequency, genital sores, hematuria and urgency.   Musculoskeletal: Positive for arthralgias and joint swelling. Negative for myalgias.   Skin: Negative for rash.   Neurological: Negative for dizziness, weakness, headaches and paresthesias.   Psychiatric/Behavioral: Negative for mood changes. The patient is not nervous/anxious.      Uses 1 or 2 sildenafil tablets to help with erectile dysfunction and the medicine does seem to work well for him.    OBJECTIVE:   /79 (BP Location: Right arm, Patient Position: Chair, Cuff Size: Adult Regular)   Pulse 60   Temp 97.7  F (36.5  C) (Oral)   Ht 1.837 m (6' 0.32\")   Wt 80.3 kg (177 lb)   SpO2 99%   BMI 23.79 kg/m      Physical Exam  GENERAL: healthy, alert and no distress  EYES: Eyes grossly normal to inspection, PERRL and conjunctivae and sclerae normal  HENT: ear canals and TM's normal, nose and mouth without ulcers or lesions  NECK: no adenopathy, no asymmetry, masses, or scars and thyroid normal to palpation.  Some discomfort in the neck with range of motion.  RESP: lungs clear to auscultation - no rales, rhonchi or wheezes  CV: regular rate and rhythm, normal S1 S2, no S3 or S4, no murmur, click or rub, no peripheral edema and peripheral pulses intact  ABDOMEN: soft, nontender, no hepatosplenomegaly, no masses   MS: no gross musculoskeletal defects noted, no edema  SKIN: He has an " oval-shaped erythematous scaly skin lesion on the left anterior thigh roughly 1 cm in width and 1-1/2 cm in length.  It looks like an advanced actinic keratosis or perhaps an early squamous cell carcinoma.  After discussion with the patient, it was cleansed, anesthetized, and then biopsied with a 3 mm punch.  He also has an erythematous scaly area on his right lateral cheek and one on the right lateral forehead.  These were both frozen with liquid nitrogen.  He also has a slightly raised crusty lesion on the top of his scalp and one in the right upper chest consistent with either seborrheic or actinic keratoses.  These were also frozen with liquid nitrogen.  NEURO: Normal strength and tone, mentation intact and speech normal  PSYCH: mentation appears normal, affect normal/bright    Diagnostic Test Results:  none     ASSESSMENT/PLAN:       ICD-10-CM    1. Routine general medical examination at a health care facility Z00.00 Lipid panel reflex to direct LDL Fasting     Glucose     Prostate spec antigen screen     CBC with platelets   2. Erectile dysfunction, unspecified erectile dysfunction type N52.9 sildenafil (REVATIO) 20 MG tablet   3. Hyperlipidemia with target LDL less than 130 E78.5    4. Neck pain M54.2    5. Skin lesion of left leg L98.9 HANDLING CHARGE TO REF LAB     Surgical pathology exam     HC PUNCH BIOPSY OF SKIN, FIRST LESION   6. Actinic keratoses L57.0 DESTRUCT PREMALIGNANT LESION, FIRST     DESTRUCT PREMALIGNANT LESION, 2-14   7. Screening for HIV (human immunodeficiency virus) Z11.4 HIV Screening   8. Need for Tdap vaccination Z23 TDAP, IM (10 - 64 YRS) - Adacel     ADMIN 1st VACCINE   9. History of basal cell cancer Z85.828      We discussed the above conditions  We will check fasting labs today as above  Continue sildenafil as needed/desired for erectile dysfunction  Continue conservative care for the neck  Discussed possible role for a Medrol Dosepak, MRI scan, etc. if symptoms persist  Continue  "to protect the skin from further sun damage  We will give him a tetanus booster today  Discussed the new shingles vaccine and we will hold off on that for now  Will check biopsy results and inform him of those when available and any further need for treatment   Plan a tentative recheck in 1 year, or sooner prn    COUNSELING:   Reviewed preventive health counseling, as reflected in patient instructions       Regular exercise       Healthy diet/nutrition       Immunizations    Vaccinated for: TDAP          BP Readings from Last 1 Encounters:   04/23/19 143/79     Estimated body mass index is 23.79 kg/m  as calculated from the following:    Height as of this encounter: 1.837 m (6' 0.32\").    Weight as of this encounter: 80.3 kg (177 lb).           reports that he has never smoked. He has never used smokeless tobacco.      Counseling Resources:  ATP IV Guidelines  Pooled Cohorts Equation Calculator  FRAX Risk Assessment  ICSI Preventive Guidelines  Dietary Guidelines for Americans, 2010  USDA's MyPlate  ASA Prophylaxis  Lung CA Screening    Fdeerico Salas MD  [facility]  "

## 2019-04-24 LAB — HIV 1+2 AB+HIV1 P24 AG SERPL QL IA: NONREACTIVE

## 2019-04-24 NOTE — RESULT ENCOUNTER NOTE
Boyd,  Your PSA is in the normal range.  Blood counts and blood sugar are nice and normal.  HIV screening test is normal/negative.  Cholesterol values still generally look good, although are a bit higher than in recent years.  Continued efforts at healthy eating and regular exercise would be appropriate treatment for this.  I will let you know your skin biopsy result when that becomes available.    Shravan

## 2019-04-25 LAB — COPATH REPORT: NORMAL

## 2019-04-26 NOTE — RESULT ENCOUNTER NOTE
Boyd,  Our suspicions were correct about the spot on your left thigh.  The pathology result confirms this to be a basal cell skin cancer, similar to what you had on your left forearm years ago.  As we have discussed, these are the most common and least serious type of skin cancer, but the growth should be removed.  Please schedule a 40-minute appointment with me at your convenience to remove it.    Shravan

## 2019-05-08 ENCOUNTER — OFFICE VISIT (OUTPATIENT)
Dept: FAMILY MEDICINE | Facility: CLINIC | Age: 64
End: 2019-05-08
Payer: COMMERCIAL

## 2019-05-08 VITALS
OXYGEN SATURATION: 96 % | WEIGHT: 177 LBS | DIASTOLIC BLOOD PRESSURE: 70 MMHG | SYSTOLIC BLOOD PRESSURE: 117 MMHG | HEART RATE: 84 BPM | TEMPERATURE: 98.5 F | BODY MASS INDEX: 23.79 KG/M2

## 2019-05-08 DIAGNOSIS — C44.719 BCC (BASAL CELL CARCINOMA), LEG, LEFT: Primary | ICD-10-CM

## 2019-05-08 PROCEDURE — 88305 TISSUE EXAM BY PATHOLOGIST: CPT | Performed by: FAMILY MEDICINE

## 2019-05-08 PROCEDURE — 11602 EXC TR-EXT MAL+MARG 1.1-2 CM: CPT | Mod: 59 | Performed by: FAMILY MEDICINE

## 2019-05-08 PROCEDURE — 99000 SPECIMEN HANDLING OFFICE-LAB: CPT | Performed by: FAMILY MEDICINE

## 2019-05-08 PROCEDURE — 12032 INTMD RPR S/A/T/EXT 2.6-7.5: CPT | Performed by: FAMILY MEDICINE

## 2019-05-08 NOTE — PROGRESS NOTES
SUBJECTIVE:   Micky Topete is a 63 year old male who presents to clinic today for the following   health issues:       Removal of basal cell skin cancer left thigh.    He was in a couple of weeks ago for his annual physical and we biopsied a growth on his left anterior thigh which came back as a basal cell carcinoma.  See previous note on this.  He is in now for excision of this lesion.  He previously had a basal cell skin cancer removed from his left forearm in November 2009.    Additional history: as documented    Reviewed  and updated as needed this visit by clinical staff  Tobacco  Allergies  Meds  Med Hx  Surg Hx  Fam Hx  Soc Hx        Reviewed and updated as needed this visit by Provider         Patient Active Problem List   Diagnosis     Hyperlipidemia with target LDL less than 130     Advanced directives, counseling/discussion     History of basal cell cancer     Cataract of both eyes     Actinic keratoses     Erectile dysfunction, unspecified erectile dysfunction type     Right-sided low back pain without sciatica, unspecified chronicity     Posterior vitreous detachment of both eyes     Past Surgical History:   Procedure Laterality Date     APPENDECTOMY       BIOPSY      Left forearm, basal cell     COLONOSCOPY      Age 51     HEAD & NECK SURGERY      5th & 7th cervical disk bulge     SOFT TISSUE SURGERY      Rt shoulder bursitis, ligament stretch     VASECTOMY         Social History     Tobacco Use     Smoking status: Never Smoker     Smokeless tobacco: Never Used   Substance Use Topics     Alcohol use: Yes     Family History   Problem Relation Age of Onset     Diabetes Mother      Eye Disorder Mother      Glaucoma Mother      Macular Degeneration Mother      Cerebrovascular Disease Mother      Hypertension Father      Heart Disease Father      Cancer - colorectal Paternal Grandfather          Current Outpatient Medications   Medication Sig Dispense Refill     sildenafil (REVATIO) 20 MG tablet  TAKE ONE TO FOUR TABLETS BY MOUTH PRIOR TO INTERCOURSE AS NEEDED 20 tablet 5     triprolidine-pseudoePHEDrine (APRODINE) 2.5-60 MG TABS Take 1 tablet by mouth every 6 hours as needed for allergies (Patient not taking: Reported on 12/14/2018) 100 tablet 1     valACYclovir (VALTREX) 1000 mg tablet TAKE 2 TABLETS BY MOUTH NOW THEN 12 HOURS LATER AS NEEDED FOR COLD SORES (Patient not taking: Reported on 3/22/2019) 8 tablet 5     Allergies   Allergen Reactions     Sulfa Drugs        ROS:  Constitutional, HEENT, cardiovascular, pulmonary, gi and gu systems are negative, except as otherwise noted.    OBJECTIVE:     /70 (BP Location: Left arm, Patient Position: Chair, Cuff Size: Adult Regular)   Pulse 84   Temp 98.5  F (36.9  C) (Oral)   Wt 80.3 kg (177 lb)   SpO2 96%   BMI 23.79 kg/m    Body mass index is 23.79 kg/m .  GENERAL: healthy, alert and no distress  SKIN: the roughly 1 cm diameter erythematous scaly skin lesion on his left anterior thigh was again identified.  The biopsy ana is evident.  After discussion with the patient, including risks and benefits of the procedure, we elected to proceed with the removal.  The lesion was cleansed with alcohol and then anesthetized with 1% lidocaine with epinephrine.  An elliptical excision was made around the lesion with roughly 2 mm borders in its width so that the total width of the excision was approximately 1.4 cm and the length was roughly 4 cm.   The wound was then closed with 2 4-0 vicryl subcuticular sutures in a layered closure followed by 8  4-0 nylon sutures through the skin.  Bacitracin and a sterile dressing were then applied.  The removed specimen was sent to pathology.      Diagnostic Test Results:  Office Visit on 04/23/2019   Component Date Value Ref Range Status     HIV Antigen Antibody Combo 04/23/2019 Nonreactive  NR^Nonreactive     Final    HIV-1 p24 Ag & HIV-1/HIV-2 Ab Not Detected     Cholesterol 04/23/2019 190  <200 mg/dL Final      Triglycerides 04/23/2019 147  <150 mg/dL Final    Fasting specimen     HDL Cholesterol 04/23/2019 49  >39 mg/dL Final     LDL Cholesterol Calculated 04/23/2019 112* <100 mg/dL Final    Comment: Above desirable:  100-129 mg/dl  Borderline High:  130-159 mg/dL  High:             160-189 mg/dL  Very high:       >189 mg/dl       Non HDL Cholesterol 04/23/2019 141* <130 mg/dL Final    Comment: Above Desirable:  130-159 mg/dl  Borderline high:  160-189 mg/dl  High:             190-219 mg/dl  Very high:       >219 mg/dl       Glucose 04/23/2019 92  70 - 99 mg/dL Final    Fasting specimen     PSA 04/23/2019 3.48  0 - 4 ug/L Final    Assay Method:  Chemiluminescence using Siemens Vista analyzer     WBC 04/23/2019 7.4  4.0 - 11.0 10e9/L Final     RBC Count 04/23/2019 4.93  4.4 - 5.9 10e12/L Final     Hemoglobin 04/23/2019 14.8  13.3 - 17.7 g/dL Final     Hematocrit 04/23/2019 43.8  40.0 - 53.0 % Final     MCV 04/23/2019 89  78 - 100 fl Final     MCH 04/23/2019 30.0  26.5 - 33.0 pg Final     MCHC 04/23/2019 33.8  31.5 - 36.5 g/dL Final     RDW 04/23/2019 12.9  10.0 - 15.0 % Final     Platelet Count 04/23/2019 200  150 - 450 10e9/L Final     Copath Report 04/23/2019    Final                    Value:Patient Name: NICK HATFIELD  MR#: [id number]  Specimen #: [id number]  Collected: 4/23/2019  Received: 4/24/2019  Reported: 4/25/2019 17:08  Ordering Phy(s): KARLA GARRETT    For improved result formatting, select 'View Enhanced Report Format' under   Linked Documents section.    SPECIMEN(S):  Skin, biopsy, left anterior thigh    FINAL DIAGNOSIS:  Skin lesion, left anterior thigh, punch biopsy:  - Basal cell carcinoma, with extension to biopsy margin.  - Mild solar degeneration of dermis.    Electronically signed out by:    Cheryl Pedro M.D.    CLINICAL HISTORY:  63 year old male.  Skin lesion, left anterior thigh.    GROSS:  A single specimen container with formalin is received labeled with the   patient's name, date of birth,  "and  medical record number. Information on the requisition slip, container, and   associated labels is confirmed.    The specimen is designated \"skin biopsy left anterior thigh\" consisting of   a 0.4 cm in diameter tan grossly  unremarkable skin punch biopsy                          , excised to a depth of 0.3 cm.  The   resection margin is inked black.  Bisected.   Entirely submitted in one cassette. (Dictated by: Kavitha Mckeon 4/24/2019   02:17 PM)    MICROSCOPIC:  Microscopic examination is performed.    The technical component of this testing was completed at the [facility], with the professional component performed   at the [facility], [address] [place] [phone] ([phone])    CPT Codes:  A: 92999-AD0    COLLECTION SITE:  Client: [facility]  Location: [facility] (B)             ASSESSMENT/PLAN:       ICD-10-CM    1. BCC (basal cell carcinoma), leg, left C44.719 Surgical pathology exam     HANDLING CHARGE TO REF LAB     REPAIR INTERMED, WOUND TRUNK/ARM/LEG 2.6-7.5 CM     EXC MALIG SKIN LESION TRUNK/ARM/LEG 1.1-2.0 CM     Routine wound care for the removal site  Return in about a week and a half for suture removal    Federico Salas MD  [facility]      "

## 2019-05-10 LAB — COPATH REPORT: NORMAL

## 2019-05-10 NOTE — RESULT ENCOUNTER NOTE
Boyd,  Good news on the pathology report.  The pathologist confirmed that all of your skin cancer was removed.  No further treatment should be required for this.    Shravan

## 2019-05-17 ENCOUNTER — ALLIED HEALTH/NURSE VISIT (OUTPATIENT)
Dept: NURSING | Facility: CLINIC | Age: 64
End: 2019-05-17
Payer: COMMERCIAL

## 2019-05-17 DIAGNOSIS — Z48.02 ENCOUNTER FOR REMOVAL OF SUTURES: Primary | ICD-10-CM

## 2019-05-17 PROCEDURE — 99207 ZZC NO CHARGE NURSE ONLY: CPT

## 2019-05-17 NOTE — PROGRESS NOTES
Micky KEVIN Chellang presents to the clinic today for removal of sutures.  The patient has had the sutures in place for 9 days.  There has been no history of infection or drainage.  8 sutures are seen located on the anterior thigh, parallel to length of thigh.  The wound is healing well with no signs of infection.  Redness at suture line about 1/2 inch out, no streaking or drainage or pain.  Tetanus status is up to date.   All sutures were easily removed today. 1/4 inch steri strips placed over a layer of tincture of benzoin.  Routine wound care discussed.  The patient will follow up as needed.    Leelee Ricardo RN  [facility]

## 2019-06-03 ENCOUNTER — MYC MEDICAL ADVICE (OUTPATIENT)
Dept: FAMILY MEDICINE | Facility: CLINIC | Age: 64
End: 2019-06-03

## 2019-06-03 NOTE — TELEPHONE ENCOUNTER
I see Dr. Charlton has an opening this evening. Can he been seen for culture and possible antibiotic start?

## 2019-06-05 ENCOUNTER — ALLIED HEALTH/NURSE VISIT (OUTPATIENT)
Dept: FAMILY MEDICINE | Facility: CLINIC | Age: 64
End: 2019-06-05
Payer: COMMERCIAL

## 2019-06-05 DIAGNOSIS — Z53.9 ERRONEOUS ENCOUNTER--DISREGARD: Primary | ICD-10-CM

## 2019-06-05 NOTE — PROGRESS NOTES
Micky Topete was evaluated in a specialty clinic today at which time his blood pressure was noted to be elevated.  He  has been advised to follow up with his primary provider or with a nurse only visit. We are also routing this message to his primary provider as an FYI.

## 2019-06-05 NOTE — PROGRESS NOTES
Error- not needed.    Pebbles Mcgovern  [facility]   Ext #[phone], [phone]  #[phone]

## 2019-06-10 ENCOUNTER — OFFICE VISIT (OUTPATIENT)
Dept: FAMILY MEDICINE | Facility: CLINIC | Age: 64
End: 2019-06-10
Payer: COMMERCIAL

## 2019-06-10 VITALS
WEIGHT: 173 LBS | TEMPERATURE: 97.8 F | BODY MASS INDEX: 23.25 KG/M2 | OXYGEN SATURATION: 99 % | HEART RATE: 69 BPM | SYSTOLIC BLOOD PRESSURE: 125 MMHG | DIASTOLIC BLOOD PRESSURE: 74 MMHG

## 2019-06-10 DIAGNOSIS — Z85.828 HISTORY OF BASAL CELL CARCINOMA: ICD-10-CM

## 2019-06-10 DIAGNOSIS — Z51.89 VISIT FOR WOUND CHECK: Primary | ICD-10-CM

## 2019-06-10 PROCEDURE — 99212 OFFICE O/P EST SF 10 MIN: CPT | Performed by: FAMILY MEDICINE

## 2019-06-10 NOTE — PROGRESS NOTES
Subjective     Micky Topete is a 63 year old male who presents to clinic today for the following health issues:    HPI   Suture site isn't healing. Has a hole in the middle of site.    He had a basal cell carcinoma removed from his left anterior a month ago on May 8.  Pathology report came back showing clear margins.  He had sutures removed on May 17 and the wound seemed to be healing up well at that time.  The wound edges were well approximated and there was no sign of infection.    About 8 days ago like the area was getting more red and tender to touch.  He was able to express some milky drainage from it.  The wound had an opening in the center of it.  He started taking some cephalexin 500 mg 3 times a day a few days ago and the wound seems to be looking better now.  It had a small opening in it this morning of about 1 or 2 mm, but now it seems to have sealed over again.  It has become less red and tender.      Patient Active Problem List   Diagnosis     Hyperlipidemia with target LDL less than 130     Advanced directives, counseling/discussion     History of basal cell cancer     Cataract of both eyes     Actinic keratoses     Erectile dysfunction, unspecified erectile dysfunction type     Right-sided low back pain without sciatica, unspecified chronicity     Posterior vitreous detachment of both eyes     Past Surgical History:   Procedure Laterality Date     APPENDECTOMY       BIOPSY      Left forearm, basal cell     COLONOSCOPY      Age 51     HEAD & NECK SURGERY      5th & 7th cervical disk bulge     SOFT TISSUE SURGERY      Rt shoulder bursitis, ligament stretch     VASECTOMY         Social History     Tobacco Use     Smoking status: Never Smoker     Smokeless tobacco: Never Used   Substance Use Topics     Alcohol use: Yes     Family History   Problem Relation Age of Onset     Diabetes Mother      Eye Disorder Mother      Glaucoma Mother      Macular Degeneration Mother      Cerebrovascular Disease Mother       Hypertension Father      Heart Disease Father      Cancer - colorectal Paternal Grandfather          Current Outpatient Medications   Medication Sig Dispense Refill     sildenafil (REVATIO) 20 MG tablet TAKE ONE TO FOUR TABLETS BY MOUTH PRIOR TO INTERCOURSE AS NEEDED 20 tablet 5     triprolidine-pseudoePHEDrine (APRODINE) 2.5-60 MG TABS Take 1 tablet by mouth every 6 hours as needed for allergies (Patient not taking: Reported on 12/14/2018) 100 tablet 1     valACYclovir (VALTREX) 1000 mg tablet TAKE 2 TABLETS BY MOUTH NOW THEN 12 HOURS LATER AS NEEDED FOR COLD SORES (Patient not taking: Reported on 3/22/2019) 8 tablet 5     Allergies   Allergen Reactions     Sulfa Drugs          Reviewed and updated as needed this visit by Provider         Review of Systems   ROS COMP: Constitutional, HEENT, cardiovascular, pulmonary, gi and gu systems are negative, except as otherwise noted.      Objective    /74 (BP Location: Right arm, Patient Position: Chair, Cuff Size: Adult Regular)   Pulse 69   Temp 97.8  F (36.6  C) (Oral)   Wt 78.5 kg (173 lb)   SpO2 99%   BMI 23.25 kg/m    Body mass index is 23.25 kg/m .  Physical Exam   GENERAL: healthy, alert and no distress  SKIN: He has a healing excision site on the left anterior thigh without any current sign of wound infection.  There is no open area currently and there is no drainage or unusual redness or sign of abscess.    Diagnostic Test Results:  Office Visit on 05/08/2019   Component Date Value Ref Range Status     Copath Report 05/08/2019    Final                    Value:Patient Name: NICK HATFIELD  MR#: [id number]  Specimen #: [id number]  Collected: 5/8/2019  Received: 5/9/2019  Reported: 5/10/2019 06:26  Ordering Phy(s): KARLA GARRETT    For improved result formatting, select 'View Enhanced Report Format' under   Linked Documents section.    SPECIMEN(S):  Skin, left anterior thigh    FINAL DIAGNOSIS:  Skin, left anterior thigh, lesion, re-excision:  -  "Basal cell carcinoma, superficial and possibly nodular, not seen at the   margins in the planes examined.    Electronically signed out by:    Alejandro Ferrer M.D., PhD    CLINICAL HISTORY:  63 year old male.  Basal cell carcinoma    GROSS:  A single specimen container with formalin is received labeled with the   patient's name, date of birth, and  medical record number. Information on the requisition slip, container, and   associated labels is confirmed.    The specimen is designated \"skin lesion left anterior thigh\" consisting of   a nonoriented 2.1 x 1.5 cm tan  wrinkled skin ellipse, excised to a dep                          th of 0.3 cm.  Central on the skin   surface is a 0.4 x 0.3 x 0.2 cm red  brown skin lesion.  The resection margin is inked black. The tips are   shaved and submitted. The remaining skin  is serially sectioned into eight pieces and entirely submitted. Summary of   Sections:  1 - tips  2 - four central cross sections to include lesion  3 - four central cross sections (Dictated by: Kavitha Mckeon 5/9/2019 02:58   PM)    MICROSCOPIC:  Microscopic examination is performed.    The technical component of this testing was completed at the [facility], with the professional component performed   at the [facility], [address] [place] [phone] ([phone])    CPT Codes:  A: 84852-BI7    COLLECTION SITE:  Client: [facility] acute hospital  Location: [facility] (B)                 Assessment & Plan       ICD-10-CM    1. History of basal cell carcinoma Z85.828      The excision site appears to be healing up appropriately now without any sign of active infection  I suspect he may have had one of the subcuticular sutures poking through the skin  He will use cephalexin for a few more days and then stop it  Continue routine wound care and expect continued " healing    Return for a recheck if symptoms worsen or fail to improve.    Federico Salas MD  [facility]

## 2019-07-10 DIAGNOSIS — B00.1 HERPES LABIALIS: ICD-10-CM

## 2019-07-10 NOTE — TELEPHONE ENCOUNTER
"Requested Prescriptions   Pending Prescriptions Disp Refills     valACYclovir (VALTREX) 1000 mg tablet [Pharmacy Med Name: VALACYCLOVIR HCL 1GM TABS] 4 tablet 10     Sig: TAKE 2 TABLETS BY MOUTH NOW THEN 12 HOURS LATER AS NEEDED FOR COLD SORES   Last Written Prescription Date:  2/20/19  Last Fill Quantity: 8,  # refills: 5   Last office visit: 6/10/2019 with prescribing provider:     Future Office Visit:        Antivirals for Herpes Protocol Failed - 7/10/2019  2:39 PM        Failed - Normal serum creatinine on file in past 12 months     No lab results found.          Passed - Patient is age 12 or older        Passed - Recent (12 mo) or future (30 days) visit within the authorizing provider's specialty     Patient had office visit in the last 12 months or has a visit in the next 30 days with authorizing provider or within the authorizing provider's specialty.  See \"Patient Info\" tab in inbasket, or \"Choose Columns\" in Meds & Orders section of the refill encounter.              Passed - Medication is active on med list          "

## 2019-07-11 RX ORDER — VALACYCLOVIR HYDROCHLORIDE 1 G/1
TABLET, FILM COATED ORAL
Qty: 16 TABLET | Refills: 5 | Status: SHIPPED | OUTPATIENT
Start: 2019-07-11 | End: 2020-10-09

## 2019-07-11 NOTE — TELEPHONE ENCOUNTER
Routing refill request to provider for review/approval because:  Failed labs.  Radha Garcia RN [facility] Triage.

## 2019-09-18 ENCOUNTER — ALLIED HEALTH/NURSE VISIT (OUTPATIENT)
Dept: NURSING | Facility: CLINIC | Age: 64
End: 2019-09-18
Payer: COMMERCIAL

## 2019-09-18 DIAGNOSIS — Z23 NEED FOR SHINGLES VACCINE: Primary | ICD-10-CM

## 2019-09-18 PROCEDURE — 99207 ZZC NO CHARGE NURSE ONLY: CPT

## 2019-09-18 PROCEDURE — 90750 HZV VACC RECOMBINANT IM: CPT

## 2019-09-18 PROCEDURE — 90471 IMMUNIZATION ADMIN: CPT

## 2019-09-18 NOTE — NURSING NOTE
Prior to immunization administration, verified patients identity using patient s name and date of birth. Please see Immunization Activity for additional information.     Screening Questionnaire for Adult Immunization    Are you sick today?   No   Do you have allergies to medications, food, a vaccine component or latex?   No   Have you ever had a serious reaction after receiving a vaccination?   No   Do you have a long-term health problem with heart disease, lung disease, asthma, kidney disease, metabolic disease (e.g. diabetes), anemia, or other blood disorder?   No   Do you have cancer, leukemia, HIV/AIDS, or any other immune system problem?   No   In the past 3 months, have you taken medications that affect  your immune system, such as prednisone, other steroids, or anticancer drugs; drugs for the treatment of rheumatoid arthritis, Crohn s disease, or psoriasis; or have you had radiation treatments?   No   Have you had a seizure, or a brain or other nervous system problem?   No   During the past year, have you received a transfusion of blood or blood     products, or been given immune (gamma) globulin or antiviral drug?   No   For women: Are you pregnant or is there a chance you could become        pregnant during the next month?   No   Have you received any vaccinations in the past 4 weeks?   No     Immunization questionnaire answers were all negative.        Per orders of Dr. Salas, injection of Shingrix Vaccines given by Gaby Rajput MA. Patient instructed to remain in clinic for 15 minutes afterwards, and to report any adverse reaction to me immediately.    Patient was given Shingrix Vaccines. Prior to medication administration, verified patients identity using patient s name and date of birth. Patient instructed to remain in clinic for 15 minutes and report any adverse reaction to staff immediately .      Was entire vial of medication used? Yes  Vial/Syringe: Single dose vial  Expiration Date:   09/19/2021  Was this medication supplied by the patient? No       Screening performed by Gaby Rajput MA on 9/18/2019 at 11:21 AM.

## 2019-10-17 ENCOUNTER — OFFICE VISIT (OUTPATIENT)
Dept: OPHTHALMOLOGY | Facility: CLINIC | Age: 64
End: 2019-10-17
Payer: COMMERCIAL

## 2019-10-17 DIAGNOSIS — H43.813 POSTERIOR VITREOUS DETACHMENT OF BOTH EYES: ICD-10-CM

## 2019-10-17 DIAGNOSIS — H52.4 PRESBYOPIA: ICD-10-CM

## 2019-10-17 DIAGNOSIS — Z01.01 ENCOUNTER FOR EXAMINATION OF EYES AND VISION WITH ABNORMAL FINDINGS: Primary | ICD-10-CM

## 2019-10-17 PROCEDURE — 92015 DETERMINE REFRACTIVE STATE: CPT | Performed by: OPHTHALMOLOGY

## 2019-10-17 PROCEDURE — 92014 COMPRE OPH EXAM EST PT 1/>: CPT | Performed by: OPHTHALMOLOGY

## 2019-10-17 ASSESSMENT — CUP TO DISC RATIO
OS_RATIO: 0.3
OD_RATIO: 0.2

## 2019-10-17 ASSESSMENT — SLIT LAMP EXAM - LIDS
COMMENTS: NORMAL
COMMENTS: NORMAL

## 2019-10-17 ASSESSMENT — VISUAL ACUITY
OD_CC+: -1
OS_CC: 20/20
OD_CC: 20/30
METHOD: SNELLEN - LINEAR
CORRECTION_TYPE: GLASSES

## 2019-10-17 ASSESSMENT — EXTERNAL EXAM - LEFT EYE: OS_EXAM: NORMAL

## 2019-10-17 ASSESSMENT — REFRACTION_WEARINGRX
OD_SPHERE: -3.00
OS_SPHERE: -3.00
OD_AXIS: 165
OS_SPHERE: -3.00
OD_ADD: +2.50
OD_AXIS: 155
OS_ADD: +2.50
OD_CYLINDER: +0.25
OD_CYLINDER: +0.50
OD_ADD: +2.50
OS_CYLINDER: SPHERE
SPECS_TYPE: PAL
OS_CYLINDER: SPHERE
OD_SPHERE: -3.25
OS_ADD: +2.50

## 2019-10-17 ASSESSMENT — REFRACTION_MANIFEST
OS_AXIS: 165
OD_AXIS: 155
OD_CYLINDER: +0.50
OS_ADD: +2.50
OD_SPHERE: -3.00
OS_CYLINDER: +0.25
OD_ADD: +2.50
OS_SPHERE: -2.75

## 2019-10-17 ASSESSMENT — TONOMETRY
OS_IOP_MMHG: 14
OS_IOP_MMHG: 15
OD_IOP_MMHG: 18
IOP_METHOD: APPLANATION
OD_IOP_MMHG: 17
IOP_METHOD: APPLANATION

## 2019-10-17 ASSESSMENT — EXTERNAL EXAM - RIGHT EYE: OD_EXAM: NORMAL

## 2019-10-17 ASSESSMENT — CONF VISUAL FIELD
OS_NORMAL: 1
OD_NORMAL: 1

## 2019-10-17 NOTE — PROGRESS NOTES
Current Eye Medications: none     Subjective: here for complete today.  Having trouble seeing sometimes bernadette right eye. Describes a yellow type floater in both eyes that he can't see all the way through.     Objective:  See Ophthalmology Exam.       Assessment:  Stable eye exam w posterior vitreous detachment both eyes.      ICD-10-CM    1. Encounter for examination of eyes and vision with abnormal findings Z01.01 REFRACTIVE STATUS   2. Presbyopia H52.4 REFRACTIVE STATUS   3. Posterior vitreous detachment of both eyes H43.813 EYE EXAM (SIMPLE-NONBILLABLE)        Plan:  Glasses Rx given - optional  Use artificial tears up to 4 times daily both eyes.  (Refresh Tears, Systane Ultra/Balance, or Theratears)  Call in June 2020 for an appointment in October 2020 for Complete Exam.    Dr. Orta [phone]

## 2019-10-17 NOTE — LETTER
10/17/2019         RE: Micky Topete  [address] [id number]        Dear Colleague,    Thank you for referring your patient, Micky Topete, to the [facility]. Please see a copy of my visit note below.     Current Eye Medications: none     Subjective: here for complete today.  Having trouble seeing sometimes bernadette right eye. Describes a yellow type floater in both eyes that he can't see all the way through.     Objective:  See Ophthalmology Exam.       Assessment:  Stable eye exam w posterior vitreous detachment both eyes.      ICD-10-CM    1. Encounter for examination of eyes and vision with abnormal findings Z01.01 REFRACTIVE STATUS   2. Presbyopia H52.4 REFRACTIVE STATUS   3. Posterior vitreous detachment of both eyes H43.813 EYE EXAM (SIMPLE-NONBILLABLE)        Plan:  Glasses Rx given - optional  Use artificial tears up to 4 times daily both eyes.  (Refresh Tears, Systane Ultra/Balance, or Theratears)  Call in June 2020 for an appointment in October 2020 for Complete Exam.    Dr. Orta [phone]           Again, thank you for allowing me to participate in the care of your patient.        Sincerely,        Jerardo Orta MD

## 2019-10-17 NOTE — PATIENT INSTRUCTIONS
Glasses Rx given - optional  Use artificial tears up to 4 times daily both eyes.  (Refresh Tears, Systane Ultra/Balance, or Theratears)  Call in June 2020 for an appointment in October 2020 for Complete Exam.    Dr. Orta [phone]

## 2019-11-09 ENCOUNTER — HEALTH MAINTENANCE LETTER (OUTPATIENT)
Age: 64
End: 2019-11-09

## 2020-02-20 ENCOUNTER — ALLIED HEALTH/NURSE VISIT (OUTPATIENT)
Dept: NURSING | Facility: CLINIC | Age: 65
End: 2020-02-20
Payer: COMMERCIAL

## 2020-02-20 DIAGNOSIS — Z23 NEED FOR SHINGLES VACCINE: Primary | ICD-10-CM

## 2020-02-20 PROCEDURE — 90471 IMMUNIZATION ADMIN: CPT

## 2020-02-20 PROCEDURE — 99207 ZZC NO CHARGE NURSE ONLY: CPT

## 2020-02-20 PROCEDURE — 90750 HZV VACC RECOMBINANT IM: CPT

## 2020-02-20 NOTE — NURSING NOTE
Prior to immunization administration, verified patients identity using patient s name and date of birth. Please see Immunization Activity for additional information.     Screening Questionnaire for Adult Immunization    Are you sick today?   No   Do you have allergies to medications, food, a vaccine component or latex?   No   Have you ever had a serious reaction after receiving a vaccination?   No   Do you have a long-term health problem with heart, lung, kidney, or metabolic disease (e.g., diabetes), asthma, a blood disorder, no spleen, complement component deficiency, a cochlear implant, or a spinal fluid leak?  Are you on long-term aspirin therapy?   No   Do you have cancer, leukemia, HIV/AIDS, or any other immune system problem?   No   Do you have a parent, brother, or sister with an immune system problem?   No   In the past 3 months, have you taken medications that affect  your immune system, such as prednisone, other steroids, or anticancer drugs; drugs for the treatment of rheumatoid arthritis, Crohn s disease, or psoriasis; or have you had radiation treatments?   No   Have you had a seizure, or a brain or other nervous system problem?   No   During the past year, have you received a transfusion of blood or blood    products, or been given immune (gamma) globulin or antiviral drug?   No   For women: Are you pregnant or is there a chance you could become       pregnant during the next month?   No   Have you received any vaccinations in the past 4 weeks?   No     Immunization questionnaire answers were all negative.        Per orders of Dr. Salas, injection of Second Shingrix Vaccines  given by Gaby Rajput MA. Patient instructed to remain in clinic for 15 minutes afterwards, and to report any adverse reaction to me immediately.       Screening performed by Gaby Rajput MA on 2/20/2020 at 11:23 AM.  VIS for Second Shingrix Vaccines  given on same date of administration.  Staff signature/Title: Gaby KOCH  DIANNE Rajput on 2/20/2020 at 11:23 AM

## 2020-03-05 ENCOUNTER — OFFICE VISIT (OUTPATIENT)
Dept: FAMILY MEDICINE | Facility: CLINIC | Age: 65
End: 2020-03-05
Payer: COMMERCIAL

## 2020-03-05 VITALS
SYSTOLIC BLOOD PRESSURE: 113 MMHG | TEMPERATURE: 98.3 F | OXYGEN SATURATION: 95 % | BODY MASS INDEX: 24.65 KG/M2 | HEIGHT: 72 IN | WEIGHT: 182 LBS | HEART RATE: 75 BPM | DIASTOLIC BLOOD PRESSURE: 74 MMHG

## 2020-03-05 DIAGNOSIS — L98.9 SKIN LESION OF RIGHT ARM: Primary | ICD-10-CM

## 2020-03-05 DIAGNOSIS — Z85.828 HISTORY OF BASAL CELL CANCER: ICD-10-CM

## 2020-03-05 DIAGNOSIS — L57.0 ACTINIC KERATOSES: ICD-10-CM

## 2020-03-05 DIAGNOSIS — L82.1 SEBORRHEIC KERATOSIS: ICD-10-CM

## 2020-03-05 PROCEDURE — 99213 OFFICE O/P EST LOW 20 MIN: CPT | Mod: 25 | Performed by: FAMILY MEDICINE

## 2020-03-05 PROCEDURE — 11104 PUNCH BX SKIN SINGLE LESION: CPT | Performed by: FAMILY MEDICINE

## 2020-03-05 PROCEDURE — 88305 TISSUE EXAM BY PATHOLOGIST: CPT | Performed by: FAMILY MEDICINE

## 2020-03-05 PROCEDURE — 99000 SPECIMEN HANDLING OFFICE-LAB: CPT | Performed by: FAMILY MEDICINE

## 2020-03-05 ASSESSMENT — MIFFLIN-ST. JEOR: SCORE: 1662.52

## 2020-03-05 NOTE — PROGRESS NOTES
Subjective     Micky Topete is a 64 year old male who presents to clinic today for the following health issues:    HPI   Derm problem. Red spot on right arm and one on the right side of his back.     He has had 2 prior basal cell carcinomas removed.  He also has a history of actinic keratoses on his face.  He has noticed a red spot on his right upper arm recently that is persisting.  There is also a raised skin growth on his right lower back that his wife noted and wanted checked out.  No other skin lesions of concern currently.    Patient Active Problem List   Diagnosis     Hyperlipidemia with target LDL less than 130     Advanced directives, counseling/discussion     History of basal cell cancer     Cataract of both eyes     Actinic keratoses     Erectile dysfunction, unspecified erectile dysfunction type     Right-sided low back pain without sciatica, unspecified chronicity     Posterior vitreous detachment of both eyes     Past Surgical History:   Procedure Laterality Date     APPENDECTOMY       BIOPSY      Left forearm, basal cell     COLONOSCOPY      Age 51     HEAD & NECK SURGERY      5th & 7th cervical disk bulge     SOFT TISSUE SURGERY      Rt shoulder bursitis, ligament stretch     VASECTOMY         Social History     Tobacco Use     Smoking status: Never Smoker     Smokeless tobacco: Never Used   Substance Use Topics     Alcohol use: Yes     Family History   Problem Relation Age of Onset     Diabetes Mother      Eye Disorder Mother      Glaucoma Mother      Macular Degeneration Mother      Cerebrovascular Disease Mother      Hypertension Father      Heart Disease Father      Cancer - colorectal Paternal Grandfather          Current Outpatient Medications   Medication Sig Dispense Refill     sildenafil (REVATIO) 20 MG tablet TAKE ONE TO FOUR TABLETS BY MOUTH PRIOR TO INTERCOURSE AS NEEDED 20 tablet 5     valACYclovir (VALTREX) 1000 mg tablet TAKE 2 TABLETS BY MOUTH NOW THEN 12 HOURS LATER AS NEEDED FOR  "COLD SORES 16 tablet 5     triprolidine-pseudoePHEDrine (APRODINE) 2.5-60 MG TABS Take 1 tablet by mouth every 6 hours as needed for allergies (Patient not taking: Reported on 12/14/2018) 100 tablet 1     Allergies   Allergen Reactions     Sulfa Drugs          Reviewed and updated as needed this visit by Provider         Review of Systems   ROS COMP: Constitutional, HEENT, cardiovascular, pulmonary, gi and gu systems are negative, except as otherwise noted.      Objective    /74 (BP Location: Right arm, Patient Position: Sitting, Cuff Size: Adult Regular)   Pulse 75   Temp 98.3  F (36.8  C) (Oral)   Ht 1.835 m (6' 0.25\")   Wt 82.6 kg (182 lb)   SpO2 95%   BMI 24.51 kg/m    Body mass index is 24.51 kg/m .  Physical Exam   GENERAL: healthy, alert and no distress  SKIN: He has a slightly raised oblong gray-colored hyperkeratotic skin lesion on the right lower back typical for a seborrheic keratosis.  He has a roughly half centimeter wide by 1 cm long erythematous skin lesion on the upper outer right arm which looks suspicious for a basal cell or squamous cell carcinoma.  After discussion with the patient, it was cleansed, anesthetized, and biopsied with a 3 mm punch.  Hemostasis was obtained by direct pressure and that was bandaged.  No other suspicious skin lesions are seen.  He still has some mild residual actinic changes on his face, but these have improved from the past from the cryo therapy that we have done.    Diagnostic Test Results:  none         Assessment & Plan       ICD-10-CM    1. Skin lesion of right arm L98.9 HANDLING CHARGE TO REF LAB     Surgical pathology exam     HC PUNCH BIOPSY OF SKIN, FIRST LESION   2. Seborrheic keratosis L82.1    3. Actinic keratoses L57.0    4. History of basal cell cancer Z85.828      The skin lesion on his right upper arm likely represents a skin cancer  We will check the biopsy results and inform him of them when available and we will likely then want to excise " the skin lesion  Reassurance about the seborrheic keratosis  We will continue to follow the actinic keratoses and likely perform cryotherapy in the future again at some point      Federico Salas MD  [facility]

## 2020-03-09 LAB — COPATH REPORT: NORMAL

## 2020-03-10 NOTE — RESULT ENCOUNTER NOTE
Boyd,  Sure enough, our suspicions were correct. Another basal cell skin cancer. You could schedule a 40 minute appt with me on a Thursday afternoon (or whenever it works for your schedule) to remove this.    Shravan

## 2020-03-26 ENCOUNTER — OFFICE VISIT (OUTPATIENT)
Dept: FAMILY MEDICINE | Facility: CLINIC | Age: 65
End: 2020-03-26
Payer: COMMERCIAL

## 2020-03-26 VITALS
DIASTOLIC BLOOD PRESSURE: 73 MMHG | SYSTOLIC BLOOD PRESSURE: 115 MMHG | HEART RATE: 74 BPM | BODY MASS INDEX: 24.38 KG/M2 | TEMPERATURE: 98 F | OXYGEN SATURATION: 95 % | WEIGHT: 181 LBS

## 2020-03-26 DIAGNOSIS — L81.4 SOLAR LENTIGO: ICD-10-CM

## 2020-03-26 DIAGNOSIS — C44.612 BASAL CELL CARCINOMA (BCC) OF SKIN OF RIGHT UPPER EXTREMITY INCLUDING SHOULDER: Primary | ICD-10-CM

## 2020-03-26 PROCEDURE — 99000 SPECIMEN HANDLING OFFICE-LAB: CPT | Performed by: FAMILY MEDICINE

## 2020-03-26 PROCEDURE — 99212 OFFICE O/P EST SF 10 MIN: CPT | Mod: 25 | Performed by: FAMILY MEDICINE

## 2020-03-26 PROCEDURE — 12031 INTMD RPR S/A/T/EXT 2.5 CM/<: CPT | Mod: 51 | Performed by: FAMILY MEDICINE

## 2020-03-26 PROCEDURE — 11602 EXC TR-EXT MAL+MARG 1.1-2 CM: CPT | Performed by: FAMILY MEDICINE

## 2020-03-26 PROCEDURE — 88305 TISSUE EXAM BY PATHOLOGIST: CPT | Performed by: FAMILY MEDICINE

## 2020-03-26 NOTE — PROGRESS NOTES
Subjective     Micky Topete is a 64 year old male who presents to clinic today for the following health issues:    HPI   Removal right arm and has a spot on his left thigh he would like checked.    He was a 3 weeks ago we did a biopsy of a skin lesion on his right upper arm which came back as a basal cell carcinoma.  He is here to have that removed.  He has also noticed a spot on his left thigh that he would like checked out.  He has had 2 previous basal cell carcinomas removed.    Patient Active Problem List   Diagnosis     Hyperlipidemia with target LDL less than 130     Advanced directives, counseling/discussion     History of basal cell cancer     Cataract of both eyes     Actinic keratoses     Erectile dysfunction, unspecified erectile dysfunction type     Right-sided low back pain without sciatica, unspecified chronicity     Posterior vitreous detachment of both eyes     Past Surgical History:   Procedure Laterality Date     APPENDECTOMY       BIOPSY      Left forearm, basal cell     COLONOSCOPY      Age 51     HEAD & NECK SURGERY      5th & 7th cervical disk bulge     SOFT TISSUE SURGERY      Rt shoulder bursitis, ligament stretch     VASECTOMY         Social History     Tobacco Use     Smoking status: Never Smoker     Smokeless tobacco: Never Used   Substance Use Topics     Alcohol use: Yes     Family History   Problem Relation Age of Onset     Diabetes Mother      Eye Disorder Mother      Glaucoma Mother      Macular Degeneration Mother      Cerebrovascular Disease Mother      Hypertension Father      Heart Disease Father      Cancer - colorectal Paternal Grandfather          Current Outpatient Medications   Medication Sig Dispense Refill     sildenafil (REVATIO) 20 MG tablet TAKE ONE TO FOUR TABLETS BY MOUTH PRIOR TO INTERCOURSE AS NEEDED 20 tablet 5     valACYclovir (VALTREX) 1000 mg tablet TAKE 2 TABLETS BY MOUTH NOW THEN 12 HOURS LATER AS NEEDED FOR COLD SORES 16 tablet 5      triprolidine-pseudoePHEDrine (APRODINE) 2.5-60 MG TABS Take 1 tablet by mouth every 6 hours as needed for allergies (Patient not taking: Reported on 12/14/2018) 100 tablet 1     Allergies   Allergen Reactions     Sulfa Drugs          Reviewed and updated as needed this visit by Provider         Review of Systems   ROS COMP: Constitutional, HEENT, cardiovascular, pulmonary, gi and gu systems are negative, except as otherwise noted.      Objective    /73 (BP Location: Right arm, Patient Position: Sitting, Cuff Size: Adult Regular)   Pulse 74   Temp 98  F (36.7  C) (Oral)   Wt 82.1 kg (181 lb)   SpO2 95%   BMI 24.38 kg/m    Body mass index is 24.38 kg/m .  Physical Exam   GENERAL: healthy, alert and no distress  SKIN: He has a roughly 1 cm diameter tan to light brown-colored macular skin lesion on the left lateral thigh which looks like a solar lentigo or perhaps a flat seborrheic keratosis.  The skin lesion on his right upper outer arm was again identified.  It is about 3/4 -1 cm in width and about 1-1/2 cm in height.  After discussion with the patient, including risks and benefits of the procedure, we elected to proceed with the removal.  The lesion was cleansed with alcohol and then anesthetized with 1% lidocaine with epinephrine.  An elliptical excision was made around the lesion with roughly 2 mm borders in its width so that the total width of the excision was approximately 1.25 and the length was roughly 3.5 cm.   The wound was then closed with 3 4-0 vicryl subcuticular sutures in a layered closure followed by 11  4-0 nylon sutures through the skin.  Bacitracin and a sterile dressing were then applied.  The removed specimen was sent to pathology.      Diagnostic Test Results:  Office Visit on 03/05/2020   Component Date Value Ref Range Status     Copath Report 03/05/2020    Final                    Value:Patient Name: NICK HATFIELD  MR#: [id number]  Specimen #: [id number]  Collected:  "3/5/2020  Received: 3/6/2020  Reported: 3/9/2020 17:58  Ordering Phy(s): FEDERICO GARRETT    For improved result formatting, select 'View Enhanced Report Format' under   Linked Documents section.    SPECIMEN(S):  Skin, right upper outer arm    FINAL DIAGNOSIS:  Skin lesion, right upper outer arm, punch biopsy:  - Superficial basal cell carcinoma.  - Moderate solar degeneration of dermis.    Electronically signed out by:    Cheryl Pedro M.D.    CLINICAL HISTORY:  64 year old male.  Persistent erythematous skin lesion on right upper   outer arm.    GROSS:  The specimen is received in formalin with proper patient identification   labeled \"right upper outer arm skin  biopsy\".  The specimen consists of tan rough-surfaced skin punch biopsy   (0.3 x 0.3 x 0.2 cm).  The specimen is  inked black.  The specimen is entirely submitted in one cassette.   (Dictated by: Pancho Gracia 3/6/2020 01:27  PM)    MICROSCOPIC                          :  Microscopic examination is performed.    The technical component of this testing was completed at the [facility], with the professional component performed   at the [facility], [address] [place] [phone] ([phone])    CPT Codes:  A: 41454-RR6    COLLECTION SITE:  Client: [facility]  Location: [facility] (B)               Assessment & Plan       ICD-10-CM    1. Basal cell carcinoma (BCC) of skin of right upper extremity including shoulder  C44.612 Surgical pathology exam     HANDLING CHARGE TO REF LAB     REPAIR INTERMED, WOUND TRUNK/ARM/LEG 2.6-7.5 CM     EXC MALIG SKIN LESION TRUNK/ARM/LEG 1.1-2.0 CM   2. Solar lentigo  L81.4      Routine wound care for the basal cell cancer removal site  Reassurance about the left thigh skin lesion    Return in about 12 days (around 4/7/2020) for suture removal.    Federico Garrett, " MD  [facility]

## 2020-03-30 LAB — COPATH REPORT: NORMAL

## 2020-03-31 NOTE — RESULT ENCOUNTER NOTE
Boyd,  Good news.  The pathology report showed that your basal cell skin cancer was completely excised.  No further treatment should be required for this.  Please continue with your routine wound care and return for the suture removal as planned.    Shravan

## 2020-04-07 ENCOUNTER — OFFICE VISIT (OUTPATIENT)
Dept: FAMILY MEDICINE | Facility: CLINIC | Age: 65
End: 2020-04-07
Payer: COMMERCIAL

## 2020-04-07 ENCOUNTER — TELEPHONE (OUTPATIENT)
Dept: FAMILY MEDICINE | Facility: CLINIC | Age: 65
End: 2020-04-07

## 2020-04-07 VITALS
DIASTOLIC BLOOD PRESSURE: 76 MMHG | HEART RATE: 70 BPM | WEIGHT: 181 LBS | OXYGEN SATURATION: 96 % | TEMPERATURE: 98.5 F | SYSTOLIC BLOOD PRESSURE: 121 MMHG | BODY MASS INDEX: 24.38 KG/M2

## 2020-04-07 DIAGNOSIS — Z48.02 VISIT FOR SUTURE REMOVAL: Primary | ICD-10-CM

## 2020-04-07 NOTE — PROGRESS NOTES
Subjective     Micky Topete is a 64 year old male who presents to clinic today for the following health issues:    HPI   Suture removal right arm.    He had a basal cell skin cancer removed from his upper right arm on March 26.  Pathology report showed complete excision of the skin cancer.  The wound seems to be healing fine for him.    Patient Active Problem List   Diagnosis     Hyperlipidemia with target LDL less than 130     Advanced directives, counseling/discussion     History of basal cell cancer     Cataract of both eyes     Actinic keratoses     Erectile dysfunction, unspecified erectile dysfunction type     Right-sided low back pain without sciatica, unspecified chronicity     Posterior vitreous detachment of both eyes     Past Surgical History:   Procedure Laterality Date     APPENDECTOMY       BIOPSY      Left forearm, basal cell     COLONOSCOPY      Age 51     HEAD & NECK SURGERY      5th & 7th cervical disk bulge     SOFT TISSUE SURGERY      Rt shoulder bursitis, ligament stretch     VASECTOMY         Social History     Tobacco Use     Smoking status: Never Smoker     Smokeless tobacco: Never Used   Substance Use Topics     Alcohol use: Yes     Family History   Problem Relation Age of Onset     Diabetes Mother      Eye Disorder Mother      Glaucoma Mother      Macular Degeneration Mother      Cerebrovascular Disease Mother      Hypertension Father      Heart Disease Father      Cancer - colorectal Paternal Grandfather          Current Outpatient Medications   Medication Sig Dispense Refill     sildenafil (REVATIO) 20 MG tablet TAKE ONE TO FOUR TABLETS BY MOUTH PRIOR TO INTERCOURSE AS NEEDED 20 tablet 5     valACYclovir (VALTREX) 1000 mg tablet TAKE 2 TABLETS BY MOUTH NOW THEN 12 HOURS LATER AS NEEDED FOR COLD SORES 16 tablet 5     triprolidine-pseudoePHEDrine (APRODINE) 2.5-60 MG TABS Take 1 tablet by mouth every 6 hours as needed for allergies (Patient not taking: Reported on 12/14/2018) 100 tablet  "1     Allergies   Allergen Reactions     Sulfa Drugs          Reviewed and updated as needed this visit by Provider         Review of Systems   ROS COMP: Constitutional, HEENT, cardiovascular, pulmonary, gi and gu systems are negative, except as otherwise noted.      Objective    /76 (BP Location: Right arm, Patient Position: Sitting, Cuff Size: Adult Regular)   Pulse 70   Temp 98.5  F (36.9  C) (Oral)   Wt 82.1 kg (181 lb)   SpO2 96%   BMI 24.38 kg/m    Body mass index is 24.38 kg/m .  Physical Exam   GENERAL: healthy, alert and no distress  SKIN: He has a healing excision site on the right upper arm.  No sign of wound infection.  The sutures were removed without difficulty.  Steri-Strips were then applied.    Diagnostic Test Results:  Office Visit on 03/26/2020   Component Date Value Ref Range Status     Copath Report 03/26/2020    Final                    Value:Patient Name: NICK HATFIELD  MR#: [id number]  Specimen #: [id number]  Collected: 3/26/2020  Received: 3/27/2020  Reported: 3/30/2020 21:07  Ordering Phy(s): KARLA GARRETT    For improved result formatting, select 'View Enhanced Report Format' under   Linked Documents section.    SPECIMEN(S):  Skin, right upper arm    FINAL DIAGNOSIS:  Skin ellipse with attached portion of subcutaneous fat, right upper arm,   re-excision:  - Basal cell carcinoma, completely excised.  - Evidence of recent biopsy site.  - Moderate solar degeneration of dermis.    Electronically signed out by:    Cheryl Pedro M.D.    CLINICAL HISTORY:  64 year old male.  Punch biopsy of skin of upper outer right arm on 3/5/20   was diagnosed as basal cell  carcinoma.    GROSS:  The specimen is received in formalin with proper patient identification   labeled \"right upper arm skin lesion\".   The specimen consists of tan unoriented skin ellipse (3.4 x 1.4 x 0.4   cm).  On the skin surface there is a  rough-surfaced poor                          ly demarcated lesion measuring " approximately 2.0 x 1.0   cm.  Half of the specimen is inked  black and the opposite half is inked blue.  The specimen is serially   sectioned.  The specimen is entirely  submitted in three cassettes.    Summary of Sections:  A1 - cross-sections of half the specimen  A2 - cross-sections of opposite half the specimen  A3 - tips (Dictated by: Pancho Gracia 3/27/2020 08:46 AM)    MICROSCOPIC:  Microscopic examination is performed.    The [facility] of [facility], with the professional component performed   at [facility], [address] [place] [id number] ([phone])    CPT Codes:  A: 29121-NO4    COLLECTION SITE:  Client: [facility]  Location: [facility] (B)               Assessment & Plan       ICD-10-CM    1. Visit for suture removal  Z48.02      Continue routine wound care  Follow-up on a prn  Basis    No follow-ups on file.    Federico Salas MD  [facility]

## 2020-04-07 NOTE — TELEPHONE ENCOUNTER
Please call and schedule patient with provider who did removal per Dr. Colunga as RN's are unable to do this due to schedule.    Radha Garcia RN,BSN  [facility]

## 2020-05-20 ENCOUNTER — OFFICE VISIT (OUTPATIENT)
Dept: FAMILY MEDICINE | Facility: CLINIC | Age: 65
End: 2020-05-20
Payer: COMMERCIAL

## 2020-05-20 ENCOUNTER — TELEPHONE (OUTPATIENT)
Dept: FAMILY MEDICINE | Facility: CLINIC | Age: 65
End: 2020-05-20

## 2020-05-20 VITALS
DIASTOLIC BLOOD PRESSURE: 72 MMHG | WEIGHT: 182 LBS | TEMPERATURE: 98.3 F | SYSTOLIC BLOOD PRESSURE: 109 MMHG | HEART RATE: 78 BPM | BODY MASS INDEX: 24.51 KG/M2

## 2020-05-20 DIAGNOSIS — L03.90 CELLULITIS, UNSPECIFIED CELLULITIS SITE: Primary | ICD-10-CM

## 2020-05-20 PROCEDURE — 99213 OFFICE O/P EST LOW 20 MIN: CPT | Performed by: PHYSICIAN ASSISTANT

## 2020-05-20 RX ORDER — CEPHALEXIN 500 MG/1
500 CAPSULE ORAL 2 TIMES DAILY
Qty: 14 CAPSULE | Refills: 0 | Status: SHIPPED | OUTPATIENT
Start: 2020-05-20 | End: 2020-05-27

## 2020-05-20 NOTE — TELEPHONE ENCOUNTER
Patient asked triage to look at an area on the back of his right upper arm.  He had a suture removal with PCP on 4/7/20.    The area is is red and hard around where sutures were taken out.  He stated that it looks like a couple stiches are underneath.  The red area looks like it is spreading a little bit downwards.  Talked to Aline Gutierrez She stated to have patient make an  Appointment with her ad PCP is home doing telephone visits today.    Patient stated that he will make a face to face appointment for today with provider.    Patient stated understanding and agreeable with the plan of care. Radha Garcia,RN,BSN, [facility] Triage.

## 2020-05-20 NOTE — PROGRESS NOTES
Subjective     Micky Topete is a 64 year old male who presents to clinic today for the following health issues:    HPI   Check wound R arm,red and sore.   Had a lesion removed a month ago and had internal and external stiches removed.  Thinks the stiches that were suppose to dissolve did not.  This has happened before.    Soreness started yesterday.  No fevers.              Patient Active Problem List   Diagnosis     Hyperlipidemia with target LDL less than 130     Advanced directives, counseling/discussion     History of basal cell cancer     Cataract of both eyes     Actinic keratoses     Erectile dysfunction, unspecified erectile dysfunction type     Right-sided low back pain without sciatica, unspecified chronicity     Posterior vitreous detachment of both eyes     Past Surgical History:   Procedure Laterality Date     APPENDECTOMY       BIOPSY      Left forearm, basal cell     COLONOSCOPY      Age 51     HEAD & NECK SURGERY      5th & 7th cervical disk bulge     SOFT TISSUE SURGERY      Rt shoulder bursitis, ligament stretch     VASECTOMY         Social History     Tobacco Use     Smoking status: Never Smoker     Smokeless tobacco: Never Used   Substance Use Topics     Alcohol use: Yes     Family History   Problem Relation Age of Onset     Diabetes Mother      Eye Disorder Mother      Glaucoma Mother      Macular Degeneration Mother      Cerebrovascular Disease Mother      Hypertension Father      Heart Disease Father      Cancer - colorectal Paternal Grandfather              Reviewed and updated as needed this visit by Provider  Allergies  Meds         Review of Systems   As above      Objective    /72   Pulse 78   Temp 98.3  F (36.8  C) (Oral)   Wt 82.6 kg (182 lb)   BMI 24.51 kg/m    Body mass index is 24.51 kg/m .  Physical Exam  Constitutional:       General: He is not in acute distress.  Skin:     Comments: About 2 cm red warm area on back of right upper arm.  3 sutures removed, central  area has small area of puss after suture removed.     Neurological:      Mental Status: He is alert.            Diagnostic Test Results:  none         Assessment & Plan     1. Cellulitis, unspecified cellulitis site  Area cleaned after remaining sutures removed.  Covered.  Start antibiotics .  Follow up as needed  - cephALEXin (KEFLEX) 500 MG capsule; Take 1 capsule (500 mg) by mouth 2 times daily for 7 days  Dispense: 14 capsule; Refill: 0           No follow-ups on file.    Aline Dixon PA-C  [facility]

## 2020-10-05 ENCOUNTER — MYC MEDICAL ADVICE (OUTPATIENT)
Dept: FAMILY MEDICINE | Facility: CLINIC | Age: 65
End: 2020-10-05

## 2020-10-05 NOTE — TELEPHONE ENCOUNTER
To provider:  Please see [employer] message. Protocol questions sent via [employer].   Thank you.     Marta Watt RN

## 2020-10-06 ENCOUNTER — TELEPHONE (OUTPATIENT)
Dept: FAMILY MEDICINE | Facility: CLINIC | Age: 65
End: 2020-10-06
Payer: COMMERCIAL

## 2020-10-06 DIAGNOSIS — R05.9 COUGH: Primary | ICD-10-CM

## 2020-10-06 PROCEDURE — U0003 INFECTIOUS AGENT DETECTION BY NUCLEIC ACID (DNA OR RNA); SEVERE ACUTE RESPIRATORY SYNDROME CORONAVIRUS 2 (SARS-COV-2) (CORONAVIRUS DISEASE [COVID-19]), AMPLIFIED PROBE TECHNIQUE, MAKING USE OF HIGH THROUGHPUT TECHNOLOGIES AS DESCRIBED BY CMS-2020-01-R: HCPCS | Performed by: INTERNAL MEDICINE

## 2020-10-07 LAB
SARS-COV-2 RNA SPEC QL NAA+PROBE: NOT DETECTED
SPECIMEN SOURCE: NORMAL

## 2020-10-07 NOTE — RESULT ENCOUNTER NOTE
Micky Topete    The covid test done yesterday is negative.   If further concerns, we could repeat swab.  Let us know    Sincerely,     ELIZA LEDEZMA M.D.

## 2020-10-09 DIAGNOSIS — B00.1 HERPES LABIALIS: ICD-10-CM

## 2020-10-09 RX ORDER — VALACYCLOVIR HYDROCHLORIDE 1 G/1
TABLET, FILM COATED ORAL
Qty: 16 TABLET | Refills: 5 | Status: SHIPPED | OUTPATIENT
Start: 2020-10-09 | End: 2022-01-13

## 2020-10-09 NOTE — TELEPHONE ENCOUNTER
"Requested Prescriptions   Pending Prescriptions Disp Refills    valACYclovir (VALTREX) 1000 mg tablet [Pharmacy Med Name: VALACYCLOVIR HCL 1GM TABS] 16 tablet 5     Sig: TAKE TWO TABLETS BY MOUTH NOW AND THEN 12 HOURS LATER AS NEEDED FOR COLD SORES       Antivirals for Herpes Protocol Failed - 10/9/2020  8:45 AM        Failed - Normal serum creatinine on file in past 12 months     No lab results found.    Ok to refill medication if creatinine is low          Passed - Patient is age 12 or older        Passed - Recent (12 mo) or future (30 days) visit within the authorizing provider's specialty     Patient has had an office visit with the authorizing provider or a provider within the authorizing providers department within the previous 12 mos or has a future within next 30 days. See \"Patient Info\" tab in inbasket, or \"Choose Columns\" in Meds & Orders section of the refill encounter.              Passed - Medication is active on med list           Routing refill request to provider for review/approval because:  Failed protocol.  Radha Garcia RN,BSN  [facility]    "

## 2020-10-16 ENCOUNTER — OFFICE VISIT (OUTPATIENT)
Dept: FAMILY MEDICINE | Facility: CLINIC | Age: 65
End: 2020-10-16
Payer: COMMERCIAL

## 2020-10-16 VITALS
TEMPERATURE: 98 F | BODY MASS INDEX: 23.98 KG/M2 | HEART RATE: 66 BPM | SYSTOLIC BLOOD PRESSURE: 115 MMHG | WEIGHT: 178.03 LBS | OXYGEN SATURATION: 97 % | DIASTOLIC BLOOD PRESSURE: 71 MMHG

## 2020-10-16 DIAGNOSIS — I20.89 OTHER FORMS OF ANGINA PECTORIS (H): Primary | ICD-10-CM

## 2020-10-16 DIAGNOSIS — R07.9 CHEST PAIN, UNSPECIFIED TYPE: Primary | ICD-10-CM

## 2020-10-16 PROCEDURE — 99214 OFFICE O/P EST MOD 30 MIN: CPT | Performed by: FAMILY MEDICINE

## 2020-10-16 PROCEDURE — 93010 ELECTROCARDIOGRAM REPORT: CPT | Performed by: INTERNAL MEDICINE

## 2020-10-16 PROCEDURE — 93005 ELECTROCARDIOGRAM TRACING: CPT | Performed by: FAMILY MEDICINE

## 2020-10-16 ASSESSMENT — PAIN SCALES - GENERAL: PAINLEVEL: NO PAIN (0)

## 2020-10-16 NOTE — PROGRESS NOTES
Subjective     Micky Topete is a 65 year old male who presents to clinic today for the following health issues:    HPI         Concern - Chest and arm pain  Onset: 6 months  Description: Left arm, chest discomfort  Intensity: mild  Progression of Symptoms:  same  Accompanying Signs & Symptoms: Loose stools  Previous history of similar problem: None  Precipitating factors:        Worsened by: N/A  Alleviating factors:        Improved by: N/A  Therapies tried and outcome: gisselle Rock MA    Boyd comes in with a roughly 6-month history of intermittent left upper chest discomfort.  He has not had any severe pain.  He rates it at about of 2 out of 10.  It is not constant.  He has not been able to identify any specific precipitating factors.  It does not seem to change with food or drink intake.  It does not seem to be brought on or change with movements of the left arm.  It is not brought on by activity or relieved by rest.  He does not have a family history of early heart disease.  He is generally healthy and fairly active.  He was able to play with his grandchildren recently and go golfing and do other physical activities without breaking on this discomfort.  He is a non-smoker without a history of hypertension or diabetes or significant hyperlipidemia.    Patient Active Problem List   Diagnosis     Hyperlipidemia with target LDL less than 130     Advanced directives, counseling/discussion     History of basal cell cancer     Cataract of both eyes     Actinic keratoses     Erectile dysfunction, unspecified erectile dysfunction type     Right-sided low back pain without sciatica, unspecified chronicity     Posterior vitreous detachment of both eyes     Current Outpatient Medications   Medication     valACYclovir (VALTREX) 1000 mg tablet     sildenafil (REVATIO) 20 MG tablet     triprolidine-pseudoePHEDrine (APRODINE) 2.5-60 MG TABS     No current facility-administered medications for this visit.           Review of Systems   Constitutional, HEENT, cardiovascular, pulmonary, gi and gu systems are negative, except as otherwise noted.      Objective    /71 (BP Location: Right arm, Patient Position: Chair, Cuff Size: Adult Regular)   Pulse 66   Temp 98  F (36.7  C) (Oral)   Wt 80.8 kg (178 lb 0.5 oz)   SpO2 97%   BMI 23.98 kg/m    Body mass index is 23.98 kg/m .  Physical Exam   GENERAL: healthy, alert and no distress  RESP: lungs clear to auscultation - no rales, rhonchi or wheezes  CV: regular rate and rhythm, normal S1 S2, no S3 or S4, no murmur, click or rub, no peripheral edema and peripheral pulses strong  MS: No pain to palpation over the left upper chest or any discomfort with range of motion of the left arm or shoulder    EKG - appears normal, NSR, normal axis, normal intervals, no acute ST/T changes c/w ischemia, no LVH by voltage criteria        Assessment & Plan     ICD-10-CM    1. Chest pain, unspecified type  R07.9 EKG 12-lead, tracing only     Echocardiogram Exercise Stress     He has had some atypical chest discomfort of unclear etiology  It is not overly suspicious for ischemic heart disease, but yet I cannot definitively explain it away as musculoskeletal or due to GERD or some other obvious cause  We discussed the option of proceeding with a stress echocardiogram and we decided to go ahead and do so to help rule out underlying CAD  If the stress echo is unremarkable, then we will treat symptomatically and follow expectantly          Return for a recheck if symptoms worsen or fail to improve.    Federico Salas MD  [facility]

## 2020-10-19 ENCOUNTER — OFFICE VISIT (OUTPATIENT)
Dept: OPHTHALMOLOGY | Facility: CLINIC | Age: 65
End: 2020-10-19
Payer: COMMERCIAL

## 2020-10-19 DIAGNOSIS — Z01.00 EXAMINATION OF EYES AND VISION: Primary | ICD-10-CM

## 2020-10-19 DIAGNOSIS — H43.813 POSTERIOR VITREOUS DETACHMENT OF BOTH EYES: ICD-10-CM

## 2020-10-19 DIAGNOSIS — H52.4 PRESBYOPIA: ICD-10-CM

## 2020-10-19 PROCEDURE — 92014 COMPRE OPH EXAM EST PT 1/>: CPT | Performed by: OPHTHALMOLOGY

## 2020-10-19 PROCEDURE — 92015 DETERMINE REFRACTIVE STATE: CPT | Performed by: OPHTHALMOLOGY

## 2020-10-19 ASSESSMENT — SLIT LAMP EXAM - LIDS
COMMENTS: NORMAL
COMMENTS: NORMAL

## 2020-10-19 ASSESSMENT — CONF VISUAL FIELD
OS_NORMAL: 1
OD_NORMAL: 1

## 2020-10-19 ASSESSMENT — VISUAL ACUITY
OS_CC: 20/25
OD_CC: 20/25
CORRECTION_TYPE: GLASSES
METHOD: SNELLEN - LINEAR
OD_CC: J1
OS_CC: J2+

## 2020-10-19 ASSESSMENT — REFRACTION_WEARINGRX
OD_AXIS: 015
OD_CYLINDER: SPHERE
OD_SPHERE: -3.00
SPECS_TYPE: SUNGLASSES
OS_ADD: +2.50
OS_SPHERE: -2.75
OS_SPHERE: -3.00
OS_ADD: +2.50
SPECS_TYPE: OLDER
OS_SPHERE: -3.00
OD_AXIS: 180
OS_CYLINDER: SPHERE
OD_SPHERE: -3.00
OS_CYLINDER: SPHERE
OD_ADD: +2.50
OD_CYLINDER: +0.25
OD_SPHERE: -3.25
OD_CYLINDER: +0.25
OD_ADD: +2.50
OS_CYLINDER: SPHERE

## 2020-10-19 ASSESSMENT — REFRACTION_MANIFEST
OD_SPHERE: -3.00
OD_CYLINDER: +0.50
OS_AXIS: 165
OD_ADD: +2.50
OD_AXIS: 135
OS_ADD: +2.50
OS_SPHERE: -2.50
OS_CYLINDER: +0.25

## 2020-10-19 ASSESSMENT — EXTERNAL EXAM - RIGHT EYE: OD_EXAM: NORMAL

## 2020-10-19 ASSESSMENT — EXTERNAL EXAM - LEFT EYE: OS_EXAM: NORMAL

## 2020-10-19 ASSESSMENT — TONOMETRY
IOP_METHOD: APPLANATION
OD_IOP_MMHG: 21
OS_IOP_MMHG: 22

## 2020-10-19 ASSESSMENT — CUP TO DISC RATIO
OS_RATIO: 0.3
OD_RATIO: 0.2

## 2020-10-19 NOTE — PATIENT INSTRUCTIONS
Glasses Rx given - optional  Use artificial tears up to 4 times daily both eyes as needed.  (Refresh Tears, Systane Ultra/Balance, or Theratears)   Call in June 2021 for an appointment in October 2021 for Complete Exam    Dr. Orta [phone]

## 2020-10-19 NOTE — PROGRESS NOTES
Current Eye Medications:       Subjective:  Comprehensive eye exam.   Pt reports his vision remain a little fuzzy, however  Vision seems unchanged.     Objective:  See Ophthalmology Exam.       Assessment:  Stable eye exam.      ICD-10-CM    1. Examination of eyes and vision  Z01.00    2. Presbyopia  H52.4    3. Posterior vitreous detachment of both eyes  H43.813         Plan:  Glasses Rx given - optional  Use artificial tears up to 4 times daily both eyes as needed.  (Refresh Tears, Systane Ultra/Balance, or Theratears)   Call in June 2021 for an appointment in October 2021 for Complete Exam    Dr. Orta [phone]

## 2020-10-19 NOTE — LETTER
10/19/2020         RE: Micky Topete  [address] [facility] [id number]        Dear Colleague,    Thank you for referring your patient, Micky Topete, to the [facility]. Please see a copy of my visit note below.     Current Eye Medications:       Subjective:  Comprehensive eye exam.   Pt reports his vision remain a little fuzzy, however  Vision seems unchanged.     Objective:  See Ophthalmology Exam.       Assessment:  Stable eye exam.      ICD-10-CM    1. Examination of eyes and vision  Z01.00    2. Presbyopia  H52.4    3. Posterior vitreous detachment of both eyes  H43.813         Plan:  Glasses Rx given - optional  Use artificial tears up to 4 times daily both eyes as needed.  (Refresh Tears, Systane Ultra/Balance, or Theratears)   Call in June 2021 for an appointment in October 2021 for Complete Exam    Dr. Orta [phone]             Again, thank you for allowing me to participate in the care of your patient.        Sincerely,        Jerardo Orta MD

## 2020-10-28 ENCOUNTER — ANCILLARY PROCEDURE (OUTPATIENT)
Dept: CARDIOLOGY | Facility: CLINIC | Age: 65
End: 2020-10-28
Attending: FAMILY MEDICINE
Payer: COMMERCIAL

## 2020-10-28 DIAGNOSIS — R07.9 CHEST PAIN, UNSPECIFIED TYPE: ICD-10-CM

## 2020-10-28 PROCEDURE — 93350 STRESS TTE ONLY: CPT | Mod: 26 | Performed by: INTERNAL MEDICINE

## 2020-10-28 PROCEDURE — 93325 DOPPLER ECHO COLOR FLOW MAPG: CPT | Mod: TC | Performed by: INTERNAL MEDICINE

## 2020-10-28 PROCEDURE — 99207 PR STATISTIC IV PUSH SINGLE INITIAL SUBSTANCE: CPT | Performed by: INTERNAL MEDICINE

## 2020-10-28 PROCEDURE — 93352 ADMIN ECG CONTRAST AGENT: CPT | Performed by: INTERNAL MEDICINE

## 2020-10-28 PROCEDURE — 93018 CV STRESS TEST I&R ONLY: CPT | Performed by: INTERNAL MEDICINE

## 2020-10-28 PROCEDURE — 93350 STRESS TTE ONLY: CPT | Mod: TC | Performed by: INTERNAL MEDICINE

## 2020-10-28 PROCEDURE — 93321 DOPPLER ECHO F-UP/LMTD STD: CPT | Mod: TC | Performed by: INTERNAL MEDICINE

## 2020-10-28 PROCEDURE — 93325 DOPPLER ECHO COLOR FLOW MAPG: CPT | Mod: 26 | Performed by: INTERNAL MEDICINE

## 2020-10-28 PROCEDURE — 93016 CV STRESS TEST SUPVJ ONLY: CPT | Performed by: INTERNAL MEDICINE

## 2020-10-28 PROCEDURE — 93321 DOPPLER ECHO F-UP/LMTD STD: CPT | Mod: 26 | Performed by: INTERNAL MEDICINE

## 2020-10-28 PROCEDURE — 93017 CV STRESS TEST TRACING ONLY: CPT | Performed by: INTERNAL MEDICINE

## 2020-10-28 RX ADMIN — Medication 3 ML: at 10:30

## 2020-10-28 NOTE — RESULT ENCOUNTER NOTE
Boyd,  Good news on the stress test.  It came back nice and normal.  No sign of any heart issues.  This would suggest that the left upper chest discomfort that you have been having intermittently in recent months is not heart related.  I therefore think we can safely observe it.  I suspect it will get better over time.  Please let me know if you have any ongoing concerns with this in the upcoming months.    Shravan

## 2020-12-06 ENCOUNTER — HEALTH MAINTENANCE LETTER (OUTPATIENT)
Age: 65
End: 2020-12-06

## 2021-04-07 ENCOUNTER — MYC MEDICAL ADVICE (OUTPATIENT)
Dept: FAMILY MEDICINE | Facility: CLINIC | Age: 66
End: 2021-04-07

## 2021-04-07 DIAGNOSIS — R35.0 URINARY FREQUENCY: Primary | ICD-10-CM

## 2021-04-07 RX ORDER — NITROFURANTOIN 25; 75 MG/1; MG/1
100 CAPSULE ORAL 2 TIMES DAILY
Qty: 10 CAPSULE | Refills: 0 | Status: SHIPPED | OUTPATIENT
Start: 2021-04-07 | End: 2021-04-28

## 2021-04-07 NOTE — TELEPHONE ENCOUNTER
Pt has upcoming appointment schedule for 04/16. Please advise if okay for UA/UC or if pt should schedule virtual appointment instead.

## 2021-04-07 NOTE — TELEPHONE ENCOUNTER
My chart message sent to patient.  Salima ALONSO CMA ([facility])

## 2021-04-08 DIAGNOSIS — R35.0 URINARY FREQUENCY: ICD-10-CM

## 2021-04-08 LAB
ALBUMIN UR-MCNC: NEGATIVE MG/DL
APPEARANCE UR: CLEAR
BILIRUB UR QL STRIP: NEGATIVE
COLOR UR AUTO: YELLOW
GLUCOSE UR STRIP-MCNC: NEGATIVE MG/DL
HGB UR QL STRIP: NEGATIVE
KETONES UR STRIP-MCNC: NEGATIVE MG/DL
LEUKOCYTE ESTERASE UR QL STRIP: NEGATIVE
NITRATE UR QL: NEGATIVE
PH UR STRIP: 5.5 PH (ref 5–7)
SOURCE: NORMAL
SP GR UR STRIP: 1.02 (ref 1–1.03)
UROBILINOGEN UR STRIP-ACNC: 0.2 EU/DL (ref 0.2–1)

## 2021-04-08 PROCEDURE — 81003 URINALYSIS AUTO W/O SCOPE: CPT | Performed by: FAMILY MEDICINE

## 2021-04-08 NOTE — RESULT ENCOUNTER NOTE
Boyd,  Your urine sample is completely normal without any sign of infection or other apparent pathology.  It does not appear that you would need to take any antibiotics, therefore.  Please feel free to follow-up with me for a virtual visit or in person visit if you have any ongoing questions or concerns about your urinary symptoms.    Shravan

## 2021-04-22 ENCOUNTER — MYC MEDICAL ADVICE (OUTPATIENT)
Dept: FAMILY MEDICINE | Facility: CLINIC | Age: 66
End: 2021-04-22

## 2021-04-22 ASSESSMENT — ENCOUNTER SYMPTOMS
FREQUENCY: 1
PALPITATIONS: 0
NERVOUS/ANXIOUS: 0
HEARTBURN: 0
CONSTIPATION: 0
WEAKNESS: 0
SHORTNESS OF BREATH: 0
JOINT SWELLING: 1
DIZZINESS: 0
ARTHRALGIAS: 1
COUGH: 0
FEVER: 0
DIARRHEA: 0
NAUSEA: 0
MYALGIAS: 0
ABDOMINAL PAIN: 0
HEADACHES: 0
EYE PAIN: 0
SORE THROAT: 0
HEMATOCHEZIA: 0
PARESTHESIAS: 0
CHILLS: 0
DYSURIA: 1
HEMATURIA: 0

## 2021-04-28 ENCOUNTER — OFFICE VISIT (OUTPATIENT)
Dept: FAMILY MEDICINE | Facility: CLINIC | Age: 66
End: 2021-04-28
Payer: COMMERCIAL

## 2021-04-28 VITALS
HEART RATE: 67 BPM | BODY MASS INDEX: 24.38 KG/M2 | TEMPERATURE: 98 F | HEIGHT: 72 IN | SYSTOLIC BLOOD PRESSURE: 118 MMHG | WEIGHT: 180 LBS | DIASTOLIC BLOOD PRESSURE: 70 MMHG | OXYGEN SATURATION: 97 %

## 2021-04-28 DIAGNOSIS — Z00.00 ROUTINE GENERAL MEDICAL EXAMINATION AT A HEALTH CARE FACILITY: Primary | ICD-10-CM

## 2021-04-28 DIAGNOSIS — N52.9 ERECTILE DYSFUNCTION, UNSPECIFIED ERECTILE DYSFUNCTION TYPE: ICD-10-CM

## 2021-04-28 DIAGNOSIS — L98.9 SKIN LESION OF BACK: ICD-10-CM

## 2021-04-28 DIAGNOSIS — Z85.828 HISTORY OF BASAL CELL CANCER: ICD-10-CM

## 2021-04-28 DIAGNOSIS — J30.2 SEASONAL ALLERGIC RHINITIS, UNSPECIFIED TRIGGER: ICD-10-CM

## 2021-04-28 DIAGNOSIS — L57.0 ACTINIC KERATOSES: ICD-10-CM

## 2021-04-28 PROCEDURE — 88305 TISSUE EXAM BY PATHOLOGIST: CPT | Performed by: PATHOLOGY

## 2021-04-28 PROCEDURE — 99000 SPECIMEN HANDLING OFFICE-LAB: CPT | Performed by: FAMILY MEDICINE

## 2021-04-28 PROCEDURE — 17000 DESTRUCT PREMALG LESION: CPT | Mod: 59 | Performed by: FAMILY MEDICINE

## 2021-04-28 PROCEDURE — 17003 DESTRUCT PREMALG LES 2-14: CPT | Performed by: FAMILY MEDICINE

## 2021-04-28 PROCEDURE — 11104 PUNCH BX SKIN SINGLE LESION: CPT | Performed by: FAMILY MEDICINE

## 2021-04-28 PROCEDURE — 99396 PREV VISIT EST AGE 40-64: CPT | Mod: 25 | Performed by: FAMILY MEDICINE

## 2021-04-28 RX ORDER — TRIPROLIDINE/PSEUDOEPHEDRINE 2.5MG-60MG
1 TABLET ORAL EVERY 6 HOURS PRN
Qty: 100 TABLET | Refills: 1 | Status: SHIPPED | OUTPATIENT
Start: 2021-04-28 | End: 2023-08-22

## 2021-04-28 RX ORDER — SILDENAFIL CITRATE 20 MG/1
TABLET ORAL
Qty: 20 TABLET | Refills: 5 | Status: SHIPPED | OUTPATIENT
Start: 2021-04-28 | End: 2022-05-02

## 2021-04-28 ASSESSMENT — ENCOUNTER SYMPTOMS
CONSTIPATION: 0
FEVER: 0
JOINT SWELLING: 1
HEMATURIA: 0
NAUSEA: 0
COUGH: 0
NERVOUS/ANXIOUS: 0
DIZZINESS: 0
DYSURIA: 1
ABDOMINAL PAIN: 0
FREQUENCY: 1
ARTHRALGIAS: 1
HEARTBURN: 0
HEADACHES: 0
SORE THROAT: 0
DIARRHEA: 0
HEMATOCHEZIA: 0
PALPITATIONS: 0
MYALGIAS: 0
SHORTNESS OF BREATH: 0
EYE PAIN: 0
CHILLS: 0
WEAKNESS: 0
PARESTHESIAS: 0

## 2021-04-28 ASSESSMENT — MIFFLIN-ST. JEOR: SCORE: 1649.6

## 2021-04-28 NOTE — PROGRESS NOTES
SUBJECTIVE:   CC: Micky Topete is an 65 year old male who presents for a preventative health visit and skin check.       Patient has been advised of split billing requirements and indicates understanding: Yes  Healthy Habits:     Getting at least 3 servings of Calcium per day:  Yes    Bi-annual eye exam:  Yes    Dental care twice a year:  Yes    Sleep apnea or symptoms of sleep apnea:  None    Diet:  Regular (no restrictions)    Frequency of exercise:  1 day/week    Duration of exercise:  15-30 minutes    Taking medications regularly:  Yes    Medication side effects:  Not applicable    PHQ-2 Total Score: 0    Additional concerns today:  Yes    Spots he would like checked.    He has had 3 prior basal cell carcinomas removed in the past, one on his left forearm, one on his right upper outer arm, and one on his left anterior thigh.  His wife is noticed a red spot on his left upper back that has been persistent for a number of months that she wanted checked out.  The patient has not noticed any other skin growths of concern.  He does have a history of actinic keratoses and has had some of those treated as well.    He had some irritative voiding symptoms earlier in the month, but his urinalysis was negative.  He used over-the-counter Pyridium for a few days and the symptoms cleared.    He is generally feeling well.  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  He is trying to stay physically active.  He is not fasting today.    He does use sildenafil for erectile dysfunction.  Usually one 20 mg pill a sufficient for him.  He would like a refill on his antihistamine decongestant for seasonal allergies.    Today's PHQ-2 Score:   PHQ-2 ( 1999 [employer]) 4/22/2021   Q1: Little interest or pleasure in doing things 0   Q2: Feeling down, depressed or hopeless 0   PHQ-2 Score 0   Q1: Little interest or pleasure in doing things Not at all   Q2: Feeling down, depressed or hopeless Not at all   PHQ-2 Score 0        Abuse: Current or Past(Physical, Sexual or Emotional)- No  Do you feel safe in your environment? Yes        Social History     Tobacco Use     Smoking status: Never Smoker     Smokeless tobacco: Never Used   Substance Use Topics     Alcohol use: Yes         Alcohol Use 4/22/2021   Prescreen: >3 drinks/day or >7 drinks/week? No   Prescreen: >3 drinks/day or >7 drinks/week? -       Last PSA:   PSA   Date Value Ref Range Status   04/23/2019 3.48 0 - 4 ug/L Final     Comment:     Assay Method:  Chemiluminescence using Siemens Vista analyzer       Reviewed orders with patient. Reviewed health maintenance and updated orders accordingly - Yes  Patient Active Problem List   Diagnosis     Hyperlipidemia with target LDL less than 130     Advanced directives, counseling/discussion     History of basal cell cancer     Cataract of both eyes     Actinic keratoses     Erectile dysfunction, unspecified erectile dysfunction type     Right-sided low back pain without sciatica, unspecified chronicity     Posterior vitreous detachment of both eyes     Past Surgical History:   Procedure Laterality Date     APPENDECTOMY       BIOPSY      Left forearm, basal cell     COLONOSCOPY      Age 51     HEAD & NECK SURGERY      5th & 7th cervical disk bulge     SOFT TISSUE SURGERY      Rt shoulder bursitis, ligament stretch     VASECTOMY         Social History     Tobacco Use     Smoking status: Never Smoker     Smokeless tobacco: Never Used   Substance Use Topics     Alcohol use: Yes     Family History   Problem Relation Age of Onset     Diabetes Mother      Eye Disorder Mother      Glaucoma Mother      Macular Degeneration Mother      Cerebrovascular Disease Mother      Hypertension Father      Heart Disease Father      Cancer - colorectal Paternal Grandfather          Current Outpatient Medications   Medication Sig Dispense Refill     diclofenac (VOLTAREN) 1 % topical gel Apply topically daily as needed for moderate pain       sildenafil  "(REVATIO) 20 MG tablet TAKE ON TO FOUR TABLETS BY MOUTH PRIOR TO INTERCOURSE AS NEEDED 20 tablet 5     triprolidine-pseudoePHEDrine (APRODINE) 2.5-60 MG TABS per tablet Take 1 tablet by mouth every 6 hours as needed for allergies 100 tablet 1     valACYclovir (VALTREX) 1000 mg tablet TAKE TWO TABLETS BY MOUTH NOW AND THEN 12 HOURS LATER AS NEEDED FOR COLD SORES 16 tablet 5     Allergies   Allergen Reactions     Sulfa Drugs        Reviewed and updated as needed this visit by clinical staff  Tobacco  Allergies  Meds   Med Hx  Surg Hx  Fam Hx  Soc Hx        Reviewed and updated as needed this visit by Provider                    Review of Systems   Constitutional: Negative for chills and fever.   HENT: Negative for congestion, ear pain, hearing loss and sore throat.    Eyes: Negative for pain and visual disturbance.   Respiratory: Negative for cough and shortness of breath.    Cardiovascular: Negative for chest pain, palpitations and peripheral edema.   Gastrointestinal: Negative for abdominal pain, constipation, diarrhea, heartburn, hematochezia and nausea.   Genitourinary: Positive for dysuria, frequency and impotence. Negative for discharge, genital sores, hematuria and urgency.   Musculoskeletal: Positive for arthralgias and joint swelling. Negative for myalgias.   Skin: Negative for rash.   Neurological: Negative for dizziness, weakness, headaches and paresthesias.   Psychiatric/Behavioral: Negative for mood changes. The patient is not nervous/anxious.          OBJECTIVE:   /70 (BP Location: Left arm, Patient Position: Sitting, Cuff Size: Adult Regular)   Pulse 67   Temp 98  F (36.7  C) (Oral)   Ht 1.837 m (6' 0.32\")   Wt 81.6 kg (180 lb)   SpO2 97%   BMI 24.19 kg/m      Physical Exam  GENERAL: healthy, alert and no distress  EYES: Eyes grossly normal to inspection, PERRL and conjunctivae and sclerae normal  HENT: Grossly normal  NECK: no adenopathy, no asymmetry, masses, or scars and thyroid " normal to palpation  RESP: lungs clear to auscultation - no rales, rhonchi or wheezes  CV: regular rate and rhythm, normal S1 S2, no S3 or S4, no murmur, click or rub, no peripheral edema and peripheral pulses strong  ABDOMEN: soft, nontender, no hepatosplenomegaly, no masses   MS: no gross musculoskeletal defects noted, no edema  SKIN: He has a couple of erythematous scaly areas on his right temple and one on the left temple consistent with actinic keratoses.  These were treated with liquid nitrogen.  He has a 1/2 -3/4 cm diameter erythematous macular slightly scaly lesion on the left posterior shoulder (upper back) that looks somewhat suspicious for skin cancer.  After discussion with the patient, the skin lesion was cleansed, anesthetized, and biopsied with a 3 mm punch.  Hemostasis was obtained by direct pressure and then the site was bandaged.  He also has a couple of subtle erythematous areas on his left arm that he says are new, so we will just observe those for now.  No other suspicious skin lesions are seen today.  NEURO: Normal strength and tone, mentation intact and speech normal  PSYCH: mentation appears normal, affect normal/bright    Diagnostic Test Results:  Labs reviewed in Epic    ASSESSMENT/PLAN:       ICD-10-CM    1. Routine general medical examination at a health care facility  Z00.00    2. Seasonal allergic rhinitis, unspecified trigger  J30.2 triprolidine-pseudoePHEDrine (APRODINE) 2.5-60 MG TABS per tablet   3. Erectile dysfunction, unspecified erectile dysfunction type  N52.9 sildenafil (REVATIO) 20 MG tablet   4. Skin lesion of back  L98.9 Surgical pathology exam     HANDLING CHARGE TO REF LAB     NH PUNCH BIOPSY OF SKIN, FIRST/SINGLE LESION   5. Actinic keratoses  L57.0 DESTRUCT PREMALIGNANT LESION, FIRST     DESTRUCT PREMALIGNANT LESION, 2-14   6. History of basal cell cancer  Z85.828      Blood pressure and other vital signs look good  We discussed the above items  We will refill his  "sildenafil and his Aprodine medications for ongoing prn use  He is not fasting today, so we elected to pass on lab work with his physical  I suggested doing fasting lab work with next year's physical  We will check his biopsy results and inform him of those when available  Continue to protect skin from further sun damage  Plan a recheck in 1 year, or sooner prn    Patient has been advised of split billing requirements and indicates understanding: Yes  COUNSELING:   Reviewed preventive health counseling, as reflected in patient instructions       Regular exercise       Healthy diet/nutrition    Estimated body mass index is 24.19 kg/m  as calculated from the following:    Height as of this encounter: 1.837 m (6' 0.32\").    Weight as of this encounter: 81.6 kg (180 lb).         He reports that he has never smoked. He has never used smokeless tobacco.      Counseling Resources:  ATP IV Guidelines  Pooled Cohorts Equation Calculator  FRAX Risk Assessment  ICSI Preventive Guidelines  Dietary Guidelines for Americans, 2010  USDA's MyPlate  ASA Prophylaxis  Lung CA Screening    Federico Salas MD  [facility]  "

## 2021-04-30 LAB — COPATH REPORT: NORMAL

## 2021-04-30 NOTE — RESULT ENCOUNTER NOTE
"Boyd,  Well, sure enough, another basal cell skin cancer!  :(  Please call and schedule a 40\" appointment with me at your convenience for removal sometime in the upcoming weeks.    Shravan"

## 2021-05-12 ENCOUNTER — OFFICE VISIT (OUTPATIENT)
Dept: FAMILY MEDICINE | Facility: CLINIC | Age: 66
End: 2021-05-12
Payer: COMMERCIAL

## 2021-05-12 VITALS
OXYGEN SATURATION: 98 % | TEMPERATURE: 97.9 F | HEART RATE: 78 BPM | SYSTOLIC BLOOD PRESSURE: 115 MMHG | DIASTOLIC BLOOD PRESSURE: 68 MMHG | WEIGHT: 177.4 LBS | BODY MASS INDEX: 23.85 KG/M2

## 2021-05-12 DIAGNOSIS — C44.519 BCC (BASAL CELL CARCINOMA), BACK: Primary | ICD-10-CM

## 2021-05-12 DIAGNOSIS — L98.9 SKIN LESION OF LEFT UPPER EXTREMITY: ICD-10-CM

## 2021-05-12 PROCEDURE — 11602 EXC TR-EXT MAL+MARG 1.1-2 CM: CPT | Performed by: FAMILY MEDICINE

## 2021-05-12 PROCEDURE — 12032 INTMD RPR S/A/T/EXT 2.6-7.5: CPT | Mod: 59 | Performed by: FAMILY MEDICINE

## 2021-05-12 PROCEDURE — 11104 PUNCH BX SKIN SINGLE LESION: CPT | Mod: 59 | Performed by: FAMILY MEDICINE

## 2021-05-12 PROCEDURE — 99000 SPECIMEN HANDLING OFFICE-LAB: CPT | Performed by: FAMILY MEDICINE

## 2021-05-12 PROCEDURE — 88305 TISSUE EXAM BY PATHOLOGIST: CPT | Performed by: PATHOLOGY

## 2021-05-12 NOTE — PROGRESS NOTES
Assessment & Plan       ICD-10-CM    1. BCC (basal cell carcinoma), back  C44.519 Surgical pathology exam     HANDLING CHARGE TO REF LAB     REPAIR INTERMED, WOUND TRUNK/ARM/LEG 2.6-7.5 CM     EXC MALIG SKIN LESION TRUNK/ARM/LEG 1.1-2.0 CM   2. Skin lesion of left upper extremity  L98.9 Surgical pathology exam     HANDLING CHARGE TO REF LAB     SD PUNCH BIOPSY OF SKIN, FIRST/SINGLE LESION     Routine wound care for the removal site in the biopsy site  We will inform him of pathology results when available    Return in about 12 days (around 5/24/2021) for for suture removal.    Federico Salas MD  [facility] TERA Nix is a 65 year old who presents for the following health issues     HPI     Removal of basal cell skin cancer.    He was in a couple of weeks ago for a routine physical and was noted to have a left upper back skin lesion.  It was biopsied and found to be a basal cell carcinoma.  He has had a few of these removed previously in the past.  There is also one spot on his left arm in particular that we were going to follow.  It has not really changed much in the last couple of weeks, but it does persist.    Patient Active Problem List   Diagnosis     Hyperlipidemia with target LDL less than 130     Advanced directives, counseling/discussion     History of basal cell cancer     Cataract of both eyes     Actinic keratoses     Erectile dysfunction, unspecified erectile dysfunction type     Right-sided low back pain without sciatica, unspecified chronicity     Posterior vitreous detachment of both eyes     Current Outpatient Medications   Medication     diclofenac (VOLTAREN) 1 % topical gel     sildenafil (REVATIO) 20 MG tablet     triprolidine-pseudoePHEDrine (APRODINE) 2.5-60 MG TABS per tablet     valACYclovir (VALTREX) 1000 mg tablet     No current facility-administered medications for this visit.          Review of Systems   Constitutional, HEENT, cardiovascular, pulmonary,  gi and gu systems are negative, except as otherwise noted.      Objective    /68 (BP Location: Right arm, Patient Position: Sitting, Cuff Size: Adult Regular)   Pulse 78   Temp 97.9  F (36.6  C) (Oral)   Wt 80.5 kg (177 lb 6.4 oz)   SpO2 98%   BMI 23.85 kg/m    Body mass index is 23.85 kg/m .  Physical Exam   GENERAL: healthy, alert and no distress  SKIN: He has a roughly 1 to 1.25 cm diameter irregularly shaped macular erythematous skin lesion on the left upper back that was previously biopsied and found to be a basal cell carcinoma.   After discussion with the patient, including risks and benefits of the procedure, we elected to proceed with the removal.  The lesion was cleansed with alcohol and then anesthetized with 1% lidocaine with epinephrine.  An elliptical excision was made around the lesion with roughly 2 mm borders in its width so that the total width of the excision was approximately 1.5-1.75 cm and the length was roughly 4 cm.   The wound was then closed with 3 4-0 vicryl subcuticular sutures in a layered closure followed by 8  4-0 nylon sutures through the skin.  Bacitracin and a sterile dressing were then applied.  The removed specimen was sent to pathology.    He was also noted to still have an erythematous scaly skin lesion with a similar appearance just above the lateral left elbow.  Given his history of numerous basal cell carcinomas, we elected to biopsy this one as well.  It was cleansed, anesthetized, and then biopsied with a 3 mm punch.  I then placed one 4-0 nylon suture at that site to close the skin.    Office Visit on 04/28/2021   Component Date Value Ref Range Status     Copath Report 04/28/2021    Final                    Value:Patient Name: NICK HATFIELD  MR#: [id number]  Specimen #: [id number]  Collected: 4/28/2021  Received: 4/29/2021  Reported: 4/30/2021 08:20  Ordering Phy(s): KARLA GARRETT    For improved result formatting, select 'View Enhanced Report Format' under    "Linked Documents section.    SPECIMEN(S):  Skin, left posterior shoulder    FINAL DIAGNOSIS:  Skin, left posterior shoulder:  - Basal cell carcinoma, superficial - see comment    COMMENT:  Although no tumor is seen at the margins in the planes examined, the tumor   is seen very close to one lateral  margin.  In addition, the margin in third dimension could not be assessed.    Conservative re-excision to ensure  complete removal is recommended.    Electronically signed out by:    Alejandro Ferrer M.D., PhD    CLINICAL HISTORY:  65 year old male.    GROSS:  The specimen is received in formalin with the proper patient information,   labeled \"left posterior shoulder\".  The specimen consists of a disrupted 0.3 cm skin punch biopsy. Inked   black                          , submitted intact, entirely in one  cassette. (Dictated by: SARANYA Acuña 4/29/2021 11:18 AM)    MICROSCOPIC:  Microscopic examination is performed.    The technical component of this testing was completed at the [facility], with the professional component performed   at the [facility]  Laboratory, [address] [place] [address]  [phone] ([phone])    CPT Codes:  A: 94785-NV2    COLLECTION SITE:  Client: [facility]  Location: [facility] (B)               "

## 2021-05-17 LAB — COPATH REPORT: NORMAL

## 2021-05-17 NOTE — RESULT ENCOUNTER NOTE
Boyd,  The basal cell carcinoma on your left upper back/posterior shoulder had clear margins, so that one was completely removed.  The biopsy on the skin just above your left elbow does show another basal cell skin cancer, unfortunately.  There is no rush to remove this one, but we could consider doing it next week when you come in for your suture removal.  I will call you about that     Shravan

## 2021-05-21 ENCOUNTER — TELEPHONE (OUTPATIENT)
Dept: FAMILY MEDICINE | Facility: CLINIC | Age: 66
End: 2021-05-21

## 2021-05-21 NOTE — TELEPHONE ENCOUNTER
Please see if patient would be able to come in for a nurse visit on Monday afternoon May 24 to get sutures out.  I could then see him sometime later in the upcoming weeks for 40-minute removal of the basal cell skin cancer above his left elbow.

## 2021-05-21 NOTE — TELEPHONE ENCOUNTER
Patient had to cancel 05/24/21 appointment as he is in [place] and will not be back until 2:00 needs sticjosseline out would like you to add on if possible.  Andra Darnell,

## 2021-05-25 ENCOUNTER — ALLIED HEALTH/NURSE VISIT (OUTPATIENT)
Dept: NURSING | Facility: CLINIC | Age: 66
End: 2021-05-25
Payer: COMMERCIAL

## 2021-05-25 DIAGNOSIS — Z48.02 VISIT FOR SUTURE REMOVAL: Primary | ICD-10-CM

## 2021-05-25 PROCEDURE — 99207 PR NO CHARGE NURSE ONLY: CPT

## 2021-05-25 NOTE — NURSING NOTE
Micky Topete presents to the clinic today for removal of sutures.  The patient has had the sutures in place for 12 days.  There has been no history of infection or drainage.  9 sutures are seen located on the 1 suture to the above the left outer elbow area and 8 sutures on the left upper/psterior shoulder area.  The wound is healing well with no signs of infection.  Tetanus status is up to date.   All sutures were easily removed today.  Routine wound care discussed.  The patient will follow up as needed.

## 2021-05-28 ENCOUNTER — OFFICE VISIT (OUTPATIENT)
Dept: FAMILY MEDICINE | Facility: CLINIC | Age: 66
End: 2021-05-28
Payer: COMMERCIAL

## 2021-05-28 VITALS
WEIGHT: 178 LBS | HEIGHT: 73 IN | OXYGEN SATURATION: 99 % | SYSTOLIC BLOOD PRESSURE: 108 MMHG | DIASTOLIC BLOOD PRESSURE: 68 MMHG | TEMPERATURE: 97.4 F | HEART RATE: 76 BPM | BODY MASS INDEX: 23.59 KG/M2 | RESPIRATION RATE: 14 BRPM

## 2021-05-28 DIAGNOSIS — C44.619 BASAL CELL CARCINOMA (BCC) OF LEFT UPPER EXTREMITY: Primary | ICD-10-CM

## 2021-05-28 PROCEDURE — 88305 TISSUE EXAM BY PATHOLOGIST: CPT | Performed by: PATHOLOGY

## 2021-05-28 PROCEDURE — 99000 SPECIMEN HANDLING OFFICE-LAB: CPT | Performed by: FAMILY MEDICINE

## 2021-05-28 PROCEDURE — 11602 EXC TR-EXT MAL+MARG 1.1-2 CM: CPT | Mod: 51 | Performed by: FAMILY MEDICINE

## 2021-05-28 PROCEDURE — 12032 INTMD RPR S/A/T/EXT 2.6-7.5: CPT | Performed by: FAMILY MEDICINE

## 2021-05-28 ASSESSMENT — MIFFLIN-ST. JEOR: SCORE: 1647.31

## 2021-05-28 NOTE — PROGRESS NOTES
"    Assessment & Plan       ICD-10-CM    1. Basal cell carcinoma (BCC) of left upper extremity  C44.619 Surgical pathology exam     HANDLING CHARGE TO REF LAB     REPAIR INTERMED, WOUND TRUNK/ARM/LEG 2.6-7.5 CM     EXC MALIG SKIN LESION TRUNK/ARM/LEG 1.1-2.0 CM     Routine wound care    Return in about 11 days (around 6/8/2021) for Suture removal.    Federico Salas MD  [facility] TERA Nix is a 65 year old who presents for the following health issues:    HPI     Chief Complaint   Patient presents with     Basal Cell Carcinoma     removal     He has had a number of basal cell carcinomas removed from the past.  We recently identified another one just above his left elbow.  He is in now for removal of the skin cancer.    Patient Active Problem List   Diagnosis     Hyperlipidemia with target LDL less than 130     Advanced directives, counseling/discussion     History of basal cell cancer     Cataract of both eyes     Actinic keratoses     Erectile dysfunction, unspecified erectile dysfunction type     Right-sided low back pain without sciatica, unspecified chronicity     Posterior vitreous detachment of both eyes     Current Outpatient Medications   Medication     diclofenac (VOLTAREN) 1 % topical gel     sildenafil (REVATIO) 20 MG tablet     triprolidine-pseudoePHEDrine (APRODINE) 2.5-60 MG TABS per tablet     valACYclovir (VALTREX) 1000 mg tablet     No current facility-administered medications for this visit.          Review of Systems   Constitutional, HEENT, cardiovascular, pulmonary, gi and gu systems are negative, except as otherwise noted.      Objective    /68   Pulse 76   Temp 97.4  F (36.3  C)   Resp 14   Ht 1.848 m (6' 0.75\")   Wt 80.7 kg (178 lb)   SpO2 99%   BMI 23.65 kg/m    Body mass index is 23.65 kg/m .  Physical Exam   GENERAL: healthy, alert and no distress  SKIN: He has an erythematous scaly lesion just above the left elbow lateral epicondyle.  It is " "irregularly shaped and roughly 1.25 cm in diameter.  After discussion with the patient, including risks and benefits of the procedure, we elected to proceed with the removal.  The lesion was cleansed with alcohol and then anesthetized with 1% lidocaine with epinephrine.  An elliptical excision was made around the lesion with roughly 2 mm borders in its width so that the total width of the excision was approximately 1.75 centimeters and the length was roughly 3.5 cm.   The wound was then closed with 3 4-0 vicryl subcuticular sutures in a layered closure followed by 8  4-0 nylon sutures through the skin.  Bacitracin and a sterile dressing were then applied.  The removed specimen was sent to pathology.    Office Visit on 05/12/2021   Component Date Value Ref Range Status     Copath Report 05/12/2021    Final                    Value:Patient Name: NICK HATFIELD  MR#: [id number]  Specimen #: [id number]  Collected: 5/12/2021  Received: 5/14/2021  Reported: 5/17/2021 08:22  Ordering Phy(s): KARLA GARRETT    For improved result formatting, select 'View Enhanced Report Format' under   Linked Documents section.    SPECIMEN(S):  A: Skin, left upper back/posterior shoulder  B: Skin, left elbow    FINAL DIAGNOSIS:  A. Skin, left upper back/posterior shoulder, excision:  - Basal cell carcinoma, superficial, not seen at the margins in the planes   examined.    B. Skin, left elbow, punch biopsy:  - Basal cell carcinoma, superficial - see comment    COMMENT:  The tumor is seen close but not at the edge of the tissue.  Since the   margin in third dimension could not be  evaluated conservative re-excision to ensure complete removal is   recommended.    Electronically signed out by:    Alejandro Ferrer M.D., PhD    CLINICAL HISTORY:  65 year old male.    GROSS:  A. The specimen is received in formalin with the patient's name and pr                          oper   identification labeled \"left upper  back - posterior shoulder \".  The " "specimen consists of tan rough-surfaced   unoriented skin ellipse (3.3 x 1.5 x  0.4 cm).  The entire skin surface has a rough surface appearance.  The   specimen is serially sectioned and  entirely submitted in two cassettes.    B. The specimen is received in formalin with proper patient identification   labeled \"left elbow skin lesion\".  The specimen consists of tan rough-surfaced skin punch biopsy (0.3 x 0.3 x   0.3 cm).  The specimen is inked  blue.  The specimen is entirely submitted in one cassette. (Dictated by:   Pancho Gracia 5/14/2021 03:24 PM)    MICROSCOPIC:  Microscopic examination is performed.    The technical component of this testing was completed at the [facility], with the professional component performed   at the [facility]  Laboratory, [address] [place] [address]  [phone] ([phone])    CPT Codes:  A: 88                          305-GM5  B: 43791-QF0    COLLECTION SITE:  Client: [facility]  Location: [facility] (B)               "

## 2021-06-02 LAB — COPATH REPORT: NORMAL

## 2021-06-08 ENCOUNTER — ALLIED HEALTH/NURSE VISIT (OUTPATIENT)
Dept: NURSING | Facility: CLINIC | Age: 66
End: 2021-06-08
Payer: COMMERCIAL

## 2021-06-08 DIAGNOSIS — Z48.02 VISIT FOR SUTURE REMOVAL: Primary | ICD-10-CM

## 2021-06-08 PROCEDURE — 99207 PR NO CHARGE NURSE ONLY: CPT

## 2021-06-08 NOTE — PROGRESS NOTES
Micky Topete presents to the clinic today for removal of sutures.  The patient has had the sutures in place for 11 days.  There has been no history of infection or drainage.  8 sutures are seen located on the left upper extremity.  The wound is healing well with no signs of infection.  Tetanus status is up to date.   All sutures were easily removed today.  Routine wound care discussed.  The patient will follow up as needed.

## 2021-09-26 ENCOUNTER — HEALTH MAINTENANCE LETTER (OUTPATIENT)
Age: 66
End: 2021-09-26

## 2021-10-05 ENCOUNTER — OFFICE VISIT (OUTPATIENT)
Dept: DERMATOLOGY | Facility: CLINIC | Age: 66
End: 2021-10-05
Payer: COMMERCIAL

## 2021-10-05 VITALS — SYSTOLIC BLOOD PRESSURE: 137 MMHG | OXYGEN SATURATION: 96 % | HEART RATE: 66 BPM | DIASTOLIC BLOOD PRESSURE: 80 MMHG

## 2021-10-05 DIAGNOSIS — L81.4 LENTIGO: ICD-10-CM

## 2021-10-05 DIAGNOSIS — Z85.828 HISTORY OF SKIN CANCER: ICD-10-CM

## 2021-10-05 DIAGNOSIS — L82.1 SEBORRHEIC KERATOSIS: ICD-10-CM

## 2021-10-05 DIAGNOSIS — D18.01 ANGIOMA OF SKIN: ICD-10-CM

## 2021-10-05 DIAGNOSIS — D23.9 DERMAL NEVUS: ICD-10-CM

## 2021-10-05 DIAGNOSIS — C44.319 BASAL CELL CARCINOMA (BCC) OF RIGHT CHEEK: Primary | ICD-10-CM

## 2021-10-05 PROCEDURE — 88331 PATH CONSLTJ SURG 1 BLK 1SPC: CPT | Performed by: DERMATOLOGY

## 2021-10-05 PROCEDURE — 99203 OFFICE O/P NEW LOW 30 MIN: CPT | Mod: 25 | Performed by: DERMATOLOGY

## 2021-10-05 PROCEDURE — 11102 TANGNTL BX SKIN SINGLE LES: CPT | Performed by: DERMATOLOGY

## 2021-10-05 NOTE — LETTER
10/5/2021         RE: Micky Topete  [address] [facility] [address]        Dear Colleague,    Thank you for referring your patient, Micky Topete, to the [facility]. Please see a copy of my visit note below.    Micky Topete , a 66 year old year old male patient, I was asked to see by Dr. Salas for spot on cheek.   Patient states this has been present for a while.  Patient reports the following symptoms:  Not healing .  Patient reports the following previous treatments none.  Patient reports the following modifying factors none.  Associated symptoms: none.  Patient has no other skin complaints today.  Remainder of the HPI, Meds, PMH, Allergies, FH, and SH was reviewed in chart.      Past Medical History:   Diagnosis Date     Actinic keratoses      Basal cell carcinoma of left lower extremity 05/2019    left anterior thigh     Basal cell carcinoma of left upper arm 05/2021    just above left elbow     Basal cell carcinoma of left upper extremity 11/2009    left forearm     Basal cell carcinoma of right upper extremity 03/2020    right upper outer arm     Cataract 09/20/2012     Hyperlipidemia LDL goal < 130      Right-sided low back pain without sciatica, unspecified chronicity age 27       Past Surgical History:   Procedure Laterality Date     APPENDECTOMY       BIOPSY      Left forearm, basal cell     COLONOSCOPY      Age 51     HEAD & NECK SURGERY      5th & 7th cervical disk bulge     SOFT TISSUE SURGERY      Rt shoulder bursitis, ligament stretch     VASECTOMY          Family History   Problem Relation Age of Onset     Diabetes Mother      Eye Disorder Mother      Glaucoma Mother      Macular Degeneration Mother      Cerebrovascular Disease Mother      Hypertension Father      Heart Disease Father      Cancer - colorectal Paternal Grandfather        Social History     Socioeconomic History     Marital status:      Spouse name: Odessa Prince  children: 2     Years of education: Not on file     Highest education level: Not on file   Occupational History     Employer: [employer]   Tobacco Use     Smoking status: Never Smoker     Smokeless tobacco: Never Used   Substance and Sexual Activity     Alcohol use: Yes     Drug use: No     Sexual activity: Yes     Partners: Female     Birth control/protection: Male Surgical     Comment: Having some ED issues   Other Topics Concern     Parent/sibling w/ CABG, MI or angioplasty before 65F 55M? No   Social History Narrative     Not on file     Social Determinants of Health     Financial Resource Strain:      Difficulty of Paying Living Expenses:    Food Insecurity:      Worried About Running Out of Food in the Last Year:      Ran Out of Food in the Last Year:    Transportation Needs:      Lack of Transportation (Medical):      Lack of Transportation (Non-Medical):    Physical Activity:      Days of Exercise per Week:      Minutes of Exercise per Session:    Stress:      Feeling of Stress :    Social Connections:      Frequency of Communication with Friends and Family:      Frequency of Social Gatherings with Friends and Family:      Attends [employer] Services:      Active Member of Clubs or Organizations:      Attends Club or Organization Meetings:      Marital Status:    Intimate Partner Violence:      Fear of Current or Ex-Partner:      Emotionally Abused:      Physically Abused:      Sexually Abused:        Outpatient Encounter Medications as of 10/5/2021   Medication Sig Dispense Refill     diclofenac (VOLTAREN) 1 % topical gel Apply topically daily as needed for moderate pain       sildenafil (REVATIO) 20 MG tablet TAKE ON TO FOUR TABLETS BY MOUTH PRIOR TO INTERCOURSE AS NEEDED 20 tablet 5     triprolidine-pseudoePHEDrine (APRODINE) 2.5-60 MG TABS per tablet Take 1 tablet by mouth every 6 hours as needed for allergies 100 tablet 1     valACYclovir (VALTREX) 1000 mg tablet TAKE TWO TABLETS BY MOUTH  NOW AND THEN 12 HOURS LATER AS NEEDED FOR COLD SORES 16 tablet 5     No facility-administered encounter medications on file as of 10/5/2021.             Review Of Systems  Skin: As above  Eyes: negative  Ears/Nose/Throat: negative  Respiratory: No shortness of breath, dyspnea on exertion, cough, or hemoptysis  Cardiovascular: negative  Gastrointestinal: negative  Genitourinary: negative  Musculoskeletal: negative  Neurologic: negative  Psychiatric: negative  Hematologic/Lymphatic/Immunologic: negative  Endocrine: negative      O:   NAD, WDWN, Alert & Oriented, Mood & Affect wnl, Vitals stable   Here today alone   /80   Pulse 66   SpO2 96%    General appearance sarah ii   Vitals stable   Alert, oriented and in no acute distress      Following lymph nodes palpated: Occipital, Cervical, Supraclavicular no lad   R ofs 1.6cm white sclerotic plaque    Stuck on papules and brown macules on trunk and ext    Red papules on trunk   Flesh colored papules on trunk      The remainder of the full exam was normal; the following areas were examined:  conjunctiva/lids, oral mucosa, neck, peripheral vascular system, abdomen, lymph nodes, digits/nails, eccrine and apocrine glands, scalp/hair, face, neck, chest, abdomen, buttocks, back, RUE, LUE, RLE, LLE       Eyes: Conjunctivae/lids:Normal     ENT: Lips, buccal mucosa, tongue: normal    MSK:Normal    Cardiovascular: peripheral edema none    Pulm: Breathing Normal    Lymph Nodes: No Head and Neck Lymphadenopathy     Neuro/Psych: Orientation:Normal; Mood/Affect:Normal      MICRO:   R orbital facial sulcus:Orthokeratosis of epidermis with a proliferation of nests of basaloid cells, with peripheral palisading and a haphazard arrangement in the center extending into the dermis, forming infiltrative strands. .  The tumor cells have hyperchromatic nuclei. Poor cytoplasm and intercellular bridging.    A/P:  1. Seborrheic keratosis, lentigo, angioma, dermal nevus, hx of non-melanoma  skin cancer   2. R OFS r/o basal cell carcinoma   TANGENTIAL BIOPSY IN HOUSE:  After consent, anesthesia with LEC and prep, tangential excision performed and dx above confirmed with frozen section histology.  No complications and routine wound care.  Patient is not on  anticoagulants and risk of bleeding discussed with patient.       I have personally reviewed all specimens and/or slides and used them with my medical judgement to determine or confirm the final diagnosis.     Patient told result basal cell carcinoma schedule .    It was a pleasure speaking to Micky Topete today.  Previous clinic  notes and pertinent laboratory tests were reviewed prior to Micky Topete's visit.  Nature and genetics of benign skin lesions dicussed with patient.  Signs and Symptoms of skin cancer discussed with patient.  Patient encouraged to perform monthly skin exams.  UV precautions reviewed with patient.  Risks of non-melanoma skin cancer discussed with patient   Return to clinic next appt        Again, thank you for allowing me to participate in the care of your patient.        Sincerely,        Joseph Barnard MD

## 2021-10-05 NOTE — PROGRESS NOTES
Micky Topete , a 66 year old year old male patient, I was asked to see by Dr. Salas for spot on cheek.   Patient states this has been present for a while.  Patient reports the following symptoms:  Not healing .  Patient reports the following previous treatments none.  Patient reports the following modifying factors none.  Associated symptoms: none.  Patient has no other skin complaints today.  Remainder of the HPI, Meds, PMH, Allergies, FH, and SH was reviewed in chart.      Past Medical History:   Diagnosis Date     Actinic keratoses      Basal cell carcinoma of left lower extremity 05/2019    left anterior thigh     Basal cell carcinoma of left upper arm 05/2021    just above left elbow     Basal cell carcinoma of left upper extremity 11/2009    left forearm     Basal cell carcinoma of right upper extremity 03/2020    right upper outer arm     Cataract 09/20/2012     Hyperlipidemia LDL goal < 130      Right-sided low back pain without sciatica, unspecified chronicity age 27       Past Surgical History:   Procedure Laterality Date     APPENDECTOMY       BIOPSY      Left forearm, basal cell     COLONOSCOPY      Age 51     HEAD & NECK SURGERY      5th & 7th cervical disk bulge     SOFT TISSUE SURGERY      Rt shoulder bursitis, ligament stretch     VASECTOMY          Family History   Problem Relation Age of Onset     Diabetes Mother      Eye Disorder Mother      Glaucoma Mother      Macular Degeneration Mother      Cerebrovascular Disease Mother      Hypertension Father      Heart Disease Father      Cancer - colorectal Paternal Grandfather        Social History     Socioeconomic History     Marital status:      Spouse name: Odessa     Number of children: 2     Years of education: Not on file     Highest education level: Not on file   Occupational History     Employer: [employer]   Tobacco Use     Smoking status: Never Smoker     Smokeless tobacco: Never Used   Substance and Sexual Activity      Alcohol use: Yes     Drug use: No     Sexual activity: Yes     Partners: Female     Birth control/protection: Male Surgical     Comment: Having some ED issues   Other Topics Concern     Parent/sibling w/ CABG, MI or angioplasty before 65F 55M? No   Social History Narrative     Not on file     Social Determinants of Health     Financial Resource Strain:      Difficulty of Paying Living Expenses:    Food Insecurity:      Worried About Running Out of Food in the Last Year:      Ran Out of Food in the Last Year:    Transportation Needs:      Lack of Transportation (Medical):      Lack of Transportation (Non-Medical):    Physical Activity:      Days of Exercise per Week:      Minutes of Exercise per Session:    Stress:      Feeling of Stress :    Social Connections:      Frequency of Communication with Friends and Family:      Frequency of Social Gatherings with Friends and Family:      Attends Worship Services:      Active Member of Clubs or Organizations:      Attends Club or Organization Meetings:      Marital Status:    Intimate Partner Violence:      Fear of Current or Ex-Partner:      Emotionally Abused:      Physically Abused:      Sexually Abused:        Outpatient Encounter Medications as of 10/5/2021   Medication Sig Dispense Refill     diclofenac (VOLTAREN) 1 % topical gel Apply topically daily as needed for moderate pain       sildenafil (REVATIO) 20 MG tablet TAKE ON TO FOUR TABLETS BY MOUTH PRIOR TO INTERCOURSE AS NEEDED 20 tablet 5     triprolidine-pseudoePHEDrine (APRODINE) 2.5-60 MG TABS per tablet Take 1 tablet by mouth every 6 hours as needed for allergies 100 tablet 1     valACYclovir (VALTREX) 1000 mg tablet TAKE TWO TABLETS BY MOUTH NOW AND THEN 12 HOURS LATER AS NEEDED FOR COLD SORES 16 tablet 5     No facility-administered encounter medications on file as of 10/5/2021.             Review Of Systems  Skin: As above  Eyes: negative  Ears/Nose/Throat: negative  Respiratory: No shortness of  breath, dyspnea on exertion, cough, or hemoptysis  Cardiovascular: negative  Gastrointestinal: negative  Genitourinary: negative  Musculoskeletal: negative  Neurologic: negative  Psychiatric: negative  Hematologic/Lymphatic/Immunologic: negative  Endocrine: negative      O:   NAD, WDWN, Alert & Oriented, Mood & Affect wnl, Vitals stable   Here today alone   /80   Pulse 66   SpO2 96%    General appearance sarah ii   Vitals stable   Alert, oriented and in no acute distress      Following lymph nodes palpated: Occipital, Cervical, Supraclavicular no lad   R ofs 1.6cm white sclerotic plaque    Stuck on papules and brown macules on trunk and ext    Red papules on trunk   Flesh colored papules on trunk      The remainder of the full exam was normal; the following areas were examined:  conjunctiva/lids, oral mucosa, neck, peripheral vascular system, abdomen, lymph nodes, digits/nails, eccrine and apocrine glands, scalp/hair, face, neck, chest, abdomen, buttocks, back, RUE, LUE, RLE, LLE       Eyes: Conjunctivae/lids:Normal     ENT: Lips, buccal mucosa, tongue: normal    MSK:Normal    Cardiovascular: peripheral edema none    Pulm: Breathing Normal    Lymph Nodes: No Head and Neck Lymphadenopathy     Neuro/Psych: Orientation:Normal; Mood/Affect:Normal      MICRO:   R orbital facial sulcus:Orthokeratosis of epidermis with a proliferation of nests of basaloid cells, with peripheral palisading and a haphazard arrangement in the center extending into the dermis, forming infiltrative strands. .  The tumor cells have hyperchromatic nuclei. Poor cytoplasm and intercellular bridging.    A/P:  1. Seborrheic keratosis, lentigo, angioma, dermal nevus, hx of non-melanoma skin cancer   2. R OFS r/o basal cell carcinoma   TANGENTIAL BIOPSY IN HOUSE:  After consent, anesthesia with LEC and prep, tangential excision performed and dx above confirmed with frozen section histology.  No complications and routine wound care.  Patient is  not on  anticoagulants and risk of bleeding discussed with patient.       I have personally reviewed all specimens and/or slides and used them with my medical judgement to determine or confirm the final diagnosis.     Patient told result basal cell carcinoma schedule .    It was a pleasure speaking to Micky Topeet today.  Previous clinic  notes and pertinent laboratory tests were reviewed prior to Micky Topete's visit.  Nature and genetics of benign skin lesions dicussed with patient.  Signs and Symptoms of skin cancer discussed with patient.  Patient encouraged to perform monthly skin exams.  UV precautions reviewed with patient.  Risks of non-melanoma skin cancer discussed with patient   Return to clinic next appt

## 2021-10-05 NOTE — PATIENT INSTRUCTIONS
Wound Care Instructions     FOR SUPERFICIAL WOUNDS     [facility] [phone]    [facility] [phone]    Right cheek                   AFTER 24 HOURS YOU SHOULD REMOVE THE BANDAGE AND BEGIN DAILY DRESSING CHANGES AS FOLLOWS:     1) Remove Dressing.     2) Clean and dry the area with tap water using a Q-tip or sterile gauze pad.     3) Apply Vaseline, Aquaphor, Polysporin ointment or Bacitracin ointment over entire wound.  Do NOT use Neosporin ointment.     4) Cover the wound with a band-aid, or a sterile non-stick gauze pad and micropore paper tape      REPEAT THESE INSTRUCTIONS AT LEAST ONCE A DAY UNTIL THE WOUND HAS COMPLETELY HEALED.    It is an old wives tale that a wound heals better when it is exposed to air and allowed to dry out. The wound will heal faster with a better cosmetic result if it is kept moist with ointment and covered with a bandage.    **Do not let the wound dry out.**      Supplies Needed:      *Cotton tipped applicators (Q-tips)    *Polysporin Ointment or Bacitracin Ointment (NOT NEOSPORIN)    *Band-aids or non-stick gauze pads and micropore paper tape.      PATIENT INFORMATION:    During the healing process you will notice a number of changes. All wounds develop a small halo of redness surrounding the wound.  This means healing is occurring. Severe itching with extensive redness usually indicates sensitivity to the ointment or bandage tape used to dress the wound.  You should call our office if this develops.      Swelling  and/or discoloration around your surgical site is common, particularly when performed around the eye.    All wounds normally drain.  The larger the wound the more drainage there will be.  After 7-10 days, you will notice the wound beginning to shrink and new skin will begin to grow.  The wound is healed when you can see skin has formed over the entire area.  A healed wound has a healthy, shiny look to the surface and is red to dark  pink in color to normalize.  Wounds may take approximately 4-6 weeks to heal.  Larger wounds may take 6-8 weeks.  After the wound is healed you may discontinue dressing changes.    You may experience a sensation of tightness as your wound heals. This is normal and will gradually subside.    Your healed wound may be sensitive to temperature changes. This sensitivity improves with time, but if you re having a lot of discomfort, try to avoid temperature extremes.    Patients frequently experience itching after their wound appears to have healed because of the continue healing under the skin.  Plain Vaseline will help relieve the itching.        POSSIBLE COMPLICATIONS    BLEEDIN. Leave the bandage in place.  2. Use tightly rolled up gauze or a cloth to apply direct pressure over the bandage for 30  minutes.  3. Reapply pressure for an additional 30 minutes if necessary  4. Use additional gauze and tape to maintain pressure once the bleeding has stopped.

## 2021-10-05 NOTE — NURSING NOTE
Chief Complaint   Patient presents with     Skin Check       Vitals:    10/05/21 1306   BP: 137/80   Pulse: 66   SpO2: 96%     Wt Readings from Last 1 Encounters:   05/28/21 80.7 kg (178 lb)       Cherry Jenkins LPN.................10/5/2021

## 2021-10-11 ENCOUNTER — OFFICE VISIT (OUTPATIENT)
Dept: DERMATOLOGY | Facility: CLINIC | Age: 66
End: 2021-10-11
Payer: COMMERCIAL

## 2021-10-11 VITALS — HEART RATE: 56 BPM | OXYGEN SATURATION: 99 % | DIASTOLIC BLOOD PRESSURE: 74 MMHG | SYSTOLIC BLOOD PRESSURE: 123 MMHG

## 2021-10-11 DIAGNOSIS — C44.319 BASAL CELL CARCINOMA (BCC) OF LEFT CHEEK: Primary | ICD-10-CM

## 2021-10-11 DIAGNOSIS — C44.111 BASAL CELL CARCINOMA (BCC) OF LEFT ORBIT: ICD-10-CM

## 2021-10-11 PROCEDURE — 17311 MOHS 1 STAGE H/N/HF/G: CPT | Performed by: DERMATOLOGY

## 2021-10-11 PROCEDURE — 17312 MOHS ADDL STAGE: CPT | Performed by: DERMATOLOGY

## 2021-10-11 PROCEDURE — 99207 PR NO CHARGE LOS: CPT | Performed by: DERMATOLOGY

## 2021-10-11 PROCEDURE — 13132 CMPLX RPR F/C/C/M/N/AX/G/H/F: CPT | Performed by: DERMATOLOGY

## 2021-10-11 NOTE — PATIENT INSTRUCTIONS
Sutured Wound Care     [facility]: [phone]    [facility]: [phone]    Right side of face      ? No strenuous activity for 48 hours. Resume moderate activity in 48 hours. No heavy exercising until you are seen for follow up in one week.     ? Take Tylenol as needed for discomfort.                         ? Do not drink alcoholic beverages for 48 hours.     ? Keep the pressure bandage in place for 24 hours. If the bandage becomes blood tinged or loose, reinforce it with gauze and tape.        (Refer to the reverse side of this page for management of bleeding).    ? Remove pressure bandage in 24 hours     ? Leave the flat bandage in place until your follow up appointment.    ? Keep the bandage dry. Wash around it carefully.    ? If the tape becomes soiled or starts to come off, reinforce it with additional paper tape.    ? Do not smoke for 3 weeks; smoking is detrimental to wound healing.    ? It is normal to have swelling and bruising around the surgical site. The bruising will fade in approximately 10-14 days. Elevate the area to reduce swelling.    ? Numbness, itchiness and sensitivity to temperature changes can occur after surgery and may take up to 18 months to normalize.      POSSIBLE COMPLICATIONS    BLEEDIN. Leave the bandage in place.  2. Use tightly rolled up gauze or a cloth to apply direct pressure over the bandage for 20   minutes.  3. Reapply pressure for an additional 20 minutes if necessary  4. Call the office or go to the nearest emergency room if pressure fails to stop the bleeding.  5. Use additional gauze and tape to maintain pressure once the bleeding has stopped.        PAIN:    1. Post operative pain should slowly get better, never worse.  2. A severe increase in pain may indicate a problem. Call the office if this occurs.    In case of emergency phone:Dr Barnard [phone]

## 2021-10-11 NOTE — LETTER
10/11/2021         RE: Micky Topete  [address] [facility] [id number]        Dear Colleague,    Thank you for referring your patient, Micky Topete, to the [facility]. Please see a copy of my visit note below.    Micky Topete is an extremely pleasant 66 year old year old male patient here today for evaluation and managment of basal cell carcinoma on right orbital facial sulcus. Patient has no other skin complaints today.  Remainder of the HPI, Meds, PMH, Allergies, FH, and SH was reviewed in chart.      Past Medical History:   Diagnosis Date     Actinic keratoses      Basal cell carcinoma of left lower extremity 05/2019    left anterior thigh     Basal cell carcinoma of left upper arm 05/2021    just above left elbow     Basal cell carcinoma of left upper extremity 11/2009    left forearm     Basal cell carcinoma of right upper extremity 03/2020    right upper outer arm     Cataract 09/20/2012     Hyperlipidemia LDL goal < 130      Right-sided low back pain without sciatica, unspecified chronicity age 27       Past Surgical History:   Procedure Laterality Date     APPENDECTOMY       BIOPSY      Left forearm, basal cell     COLONOSCOPY      Age 51     HEAD & NECK SURGERY      5th & 7th cervical disk bulge     SOFT TISSUE SURGERY      Rt shoulder bursitis, ligament stretch     VASECTOMY          Family History   Problem Relation Age of Onset     Diabetes Mother      Eye Disorder Mother      Glaucoma Mother      Macular Degeneration Mother      Cerebrovascular Disease Mother      Hypertension Father      Heart Disease Father      Cancer - colorectal Paternal Grandfather        Social History     Socioeconomic History     Marital status:      Spouse name: Odessa     Number of children: 2     Years of education: Not on file     Highest education level: Not on file   Occupational History     Employer: [employer]   Tobacco Use     Smoking status:  Never Smoker     Smokeless tobacco: Never Used   Substance and Sexual Activity     Alcohol use: Yes     Drug use: No     Sexual activity: Yes     Partners: Female     Birth control/protection: Male Surgical     Comment: Having some ED issues   Other Topics Concern     Parent/sibling w/ CABG, MI or angioplasty before 65F 55M? No   Social History Narrative     Not on file     Social Determinants of Health     Financial Resource Strain:      Difficulty of Paying Living Expenses:    Food Insecurity:      Worried About Running Out of Food in the Last Year:      Ran Out of Food in the Last Year:    Transportation Needs:      Lack of Transportation (Medical):      Lack of Transportation (Non-Medical):    Physical Activity:      Days of Exercise per Week:      Minutes of Exercise per Session:    Stress:      Feeling of Stress :    Social Connections:      Frequency of Communication with Friends and Family:      Frequency of Social Gatherings with Friends and Family:      Attends Bahai Services:      Active Member of Clubs or Organizations:      Attends Club or Organization Meetings:      Marital Status:    Intimate Partner Violence:      Fear of Current or Ex-Partner:      Emotionally Abused:      Physically Abused:      Sexually Abused:        Outpatient Encounter Medications as of 10/11/2021   Medication Sig Dispense Refill     diclofenac (VOLTAREN) 1 % topical gel Apply topically daily as needed for moderate pain       sildenafil (REVATIO) 20 MG tablet TAKE ON TO FOUR TABLETS BY MOUTH PRIOR TO INTERCOURSE AS NEEDED 20 tablet 5     triprolidine-pseudoePHEDrine (APRODINE) 2.5-60 MG TABS per tablet Take 1 tablet by mouth every 6 hours as needed for allergies 100 tablet 1     valACYclovir (VALTREX) 1000 mg tablet TAKE TWO TABLETS BY MOUTH NOW AND THEN 12 HOURS LATER AS NEEDED FOR COLD SORES 16 tablet 5     No facility-administered encounter medications on file as of 10/11/2021.             O:   NAD, AMANDAWN, Alert &  Oriented, Mood & Affect wnl, Vitals stable   Here today alone   /74 (BP Location: Left arm, Patient Position: Sitting, Cuff Size: Adult Regular)   Pulse 56   SpO2 99%    General appearance normal   Vitals stable   Alert, oriented and in no acute distress     R OFS 1.6cm red plaque      Eyes: Conjunctivae/lids:Normal     ENT: Lips, buccal mucosa, tongue: normal    MSK:Normal    Cardiovascular: peripheral edema none    Pulm: Breathing Normal    Neuro/Psych: Orientation:Alert and Orientedx3 ; Mood/Affect:normal       A/P:  1. R OFS basal cell carcinoma   MOHS:   Location    The rationale for Mohs surgery was discussed with the patient and consent was obtained.  The risks and benefits as well as alternatives to therapy were discussed, in detail.  Specifically, the risks of infection, scarring, bleeding, prolonged wound healing, incomplete removal, allergy to anesthesia, nerve injury and recurrence were addressed.  Indication for Mohs was Location. Prior to the procedure, the treatment site was clearly identified and, if available, confirmed with previous photos and confirmed by the patient   All components of the Universal Protocol/PAUSE rule were completed.  The Mohs surgeon operated in two distinct and integrated capacities as the surgeon and pathologist.      The area was prepped with Betasept.  A rim of normal appearing skin was marked circumferentially around the lesion.  The area was infiltrated with local anesthesia.  The tumor was first debulked to remove all clinically apparent tumor.  An incision following the standard Mohs approach was done and the specimen was oriented,mapped and placed in 2 block(s).  Each specimen was then chromacoded and processed in the Mohs laboratory using standard Mohs technique and submitted for frozen section histology.  Frozen section analysis showed  residual tumor but CLEAR MARGINS.    First stage:Orthokeratosis of epidermis with a proliferation of nests of basaloid  cells, with peripheral palisading and a haphazard arrangement in the center extending into the dermis, forming infiltrative strands.  The tumor cells have hyperchromatic nuclei. Poor cytoplasm and intercellular bridging.      The tumor was excised using standard Mohs technique in 2 stages(s).  CLEAR MARGINS OBTAINED and Final defect size was 2.5 x 2.3 cm.     We discussed the options for wound management in full with the patient including risks/benefits/ possible outcomes.        REPAIR COMPLEX: Because of the tightness of the surrounding skin and Because of the size and full thickness nature of the defect, Because of the tightness of the surrounding skin, To maintain form and function, In order to avoid distortion and Because of the proximity to the lid, a complex closure was planned. After LEC anesthesia and prep, Burow's triangles were excised in the relaxed skin tension lines. The wound edges were widely undermined greater than width of the defect on both sides (2.3 cm) by dissection in the subcutaneous plane until adequate tissue mobility was obtained. Hemostasis was obtained. The wound edges were closed in a layered fashion using Vicryl and Fast Absorbing Plain Gut sutures. Postoperative length was 6.8 cm.   EBL minimal; complications none; wound care routine.  The patient was discharged in good condition and will return in one week for wound evaluation.  It was a pleasure speaking to Micky Topete today.  Previous clinic notes and pertinent laboratory tests were reviewed prior to Micky Topete's visit.  Nature and genetics of benign skin lesions dicussed with patient.  Signs and Symptoms of skin cancer discussed with patient.  Patient encouraged to perform monthly skin exams.  UV precautions reviewed with patient.  Risks of non-melanoma skin cancer discussed with patient   Return to clinic 2 months        Again, thank you for allowing me to participate in the care of your patient.         Sincerely,        Joseph Barnard MD

## 2021-10-11 NOTE — NURSING NOTE
Chief Complaint   Patient presents with     Derm Problem     Mohs Franci M. Mcphee on 10/11/2021 at 7:21 AM

## 2021-10-11 NOTE — NURSING NOTE
Surgical Office Location :   [facility]  [address] [place] [address]

## 2021-10-11 NOTE — PROGRESS NOTES
Micky Topete is an extremely pleasant 66 year old year old male patient here today for evaluation and managment of basal cell carcinoma on right orbital facial sulcus. Patient has no other skin complaints today.  Remainder of the HPI, Meds, PMH, Allergies, FH, and SH was reviewed in chart.      Past Medical History:   Diagnosis Date     Actinic keratoses      Basal cell carcinoma of left lower extremity 05/2019    left anterior thigh     Basal cell carcinoma of left upper arm 05/2021    just above left elbow     Basal cell carcinoma of left upper extremity 11/2009    left forearm     Basal cell carcinoma of right upper extremity 03/2020    right upper outer arm     Cataract 09/20/2012     Hyperlipidemia LDL goal < 130      Right-sided low back pain without sciatica, unspecified chronicity age 27       Past Surgical History:   Procedure Laterality Date     APPENDECTOMY       BIOPSY      Left forearm, basal cell     COLONOSCOPY      Age 51     HEAD & NECK SURGERY      5th & 7th cervical disk bulge     SOFT TISSUE SURGERY      Rt shoulder bursitis, ligament stretch     VASECTOMY          Family History   Problem Relation Age of Onset     Diabetes Mother      Eye Disorder Mother      Glaucoma Mother      Macular Degeneration Mother      Cerebrovascular Disease Mother      Hypertension Father      Heart Disease Father      Cancer - colorectal Paternal Grandfather        Social History     Socioeconomic History     Marital status:      Spouse name: [place]     Number of children: 2     Years of education: Not on file     Highest education level: Not on file   Occupational History     Employer: [employer]   Tobacco Use     Smoking status: Never Smoker     Smokeless tobacco: Never Used   Substance and Sexual Activity     Alcohol use: Yes     Drug use: No     Sexual activity: Yes     Partners: Female     Birth control/protection: Male Surgical     Comment: Having some ED issues   Other Topics Concern      Parent/sibling w/ CABG, MI or angioplasty before 65F 55M? No   Social History Narrative     Not on file     Social Determinants of Health     Financial Resource Strain:      Difficulty of Paying Living Expenses:    Food Insecurity:      Worried About Running Out of Food in the Last Year:      Ran Out of Food in the Last Year:    Transportation Needs:      Lack of Transportation (Medical):      Lack of Transportation (Non-Medical):    Physical Activity:      Days of Exercise per Week:      Minutes of Exercise per Session:    Stress:      Feeling of Stress :    Social Connections:      Frequency of Communication with Friends and Family:      Frequency of Social Gatherings with Friends and Family:      Attends Amish Services:      Active Member of Clubs or Organizations:      Attends Club or Organization Meetings:      Marital Status:    Intimate Partner Violence:      Fear of Current or Ex-Partner:      Emotionally Abused:      Physically Abused:      Sexually Abused:        Outpatient Encounter Medications as of 10/11/2021   Medication Sig Dispense Refill     diclofenac (VOLTAREN) 1 % topical gel Apply topically daily as needed for moderate pain       sildenafil (REVATIO) 20 MG tablet TAKE ON TO FOUR TABLETS BY MOUTH PRIOR TO INTERCOURSE AS NEEDED 20 tablet 5     triprolidine-pseudoePHEDrine (APRODINE) 2.5-60 MG TABS per tablet Take 1 tablet by mouth every 6 hours as needed for allergies 100 tablet 1     valACYclovir (VALTREX) 1000 mg tablet TAKE TWO TABLETS BY MOUTH NOW AND THEN 12 HOURS LATER AS NEEDED FOR COLD SORES 16 tablet 5     No facility-administered encounter medications on file as of 10/11/2021.             O:   NAD, WDWN, Alert & Oriented, Mood & Affect wnl, Vitals stable   Here today alone   /74 (BP Location: Left arm, Patient Position: Sitting, Cuff Size: Adult Regular)   Pulse 56   SpO2 99%    General appearance normal   Vitals stable   Alert, oriented and in no acute distress     R OFS  1.6cm red plaque      Eyes: Conjunctivae/lids:Normal     ENT: Lips, buccal mucosa, tongue: normal    MSK:Normal    Cardiovascular: peripheral edema none    Pulm: Breathing Normal    Neuro/Psych: Orientation:Alert and Orientedx3 ; Mood/Affect:normal       A/P:  1. R OFS basal cell carcinoma   MOHS:   Location    The rationale for Mohs surgery was discussed with the patient and consent was obtained.  The risks and benefits as well as alternatives to therapy were discussed, in detail.  Specifically, the risks of infection, scarring, bleeding, prolonged wound healing, incomplete removal, allergy to anesthesia, nerve injury and recurrence were addressed.  Indication for Mohs was Location. Prior to the procedure, the treatment site was clearly identified and, if available, confirmed with previous photos and confirmed by the patient   All components of the Universal Protocol/PAUSE rule were completed.  The Mohs surgeon operated in two distinct and integrated capacities as the surgeon and pathologist.      The area was prepped with Betasept.  A rim of normal appearing skin was marked circumferentially around the lesion.  The area was infiltrated with local anesthesia.  The tumor was first debulked to remove all clinically apparent tumor.  An incision following the standard Mohs approach was done and the specimen was oriented,mapped and placed in 2 block(s).  Each specimen was then chromacoded and processed in the Mohs laboratory using standard Mohs technique and submitted for frozen section histology.  Frozen section analysis showed  residual tumor but CLEAR MARGINS.    First stage:Orthokeratosis of epidermis with a proliferation of nests of basaloid cells, with peripheral palisading and a haphazard arrangement in the center extending into the dermis, forming infiltrative strands.  The tumor cells have hyperchromatic nuclei. Poor cytoplasm and intercellular bridging.      The tumor was excised using standard Mohs technique  in 2 stages(s).  CLEAR MARGINS OBTAINED and Final defect size was 2.5 x 2.3 cm.     We discussed the options for wound management in full with the patient including risks/benefits/ possible outcomes.        REPAIR COMPLEX: Because of the tightness of the surrounding skin and Because of the size and full thickness nature of the defect, Because of the tightness of the surrounding skin, To maintain form and function, In order to avoid distortion and Because of the proximity to the lid, a complex closure was planned. After LEC anesthesia and prep, Burow's triangles were excised in the relaxed skin tension lines. The wound edges were widely undermined greater than width of the defect on both sides (2.3 cm) by dissection in the subcutaneous plane until adequate tissue mobility was obtained. Hemostasis was obtained. The wound edges were closed in a layered fashion using Vicryl and Fast Absorbing Plain Gut sutures. Postoperative length was 6.8 cm.   EBL minimal; complications none; wound care routine.  The patient was discharged in good condition and will return in one week for wound evaluation.  It was a pleasure speaking to Micky Topete today.  Previous clinic notes and pertinent laboratory tests were reviewed prior to Micky Topete's visit.  Nature and genetics of benign skin lesions dicussed with patient.  Signs and Symptoms of skin cancer discussed with patient.  Patient encouraged to perform monthly skin exams.  UV precautions reviewed with patient.  Risks of non-melanoma skin cancer discussed with patient   Return to clinic 2 months

## 2021-10-18 ENCOUNTER — OFFICE VISIT (OUTPATIENT)
Dept: DERMATOLOGY | Facility: CLINIC | Age: 66
End: 2021-10-18
Payer: COMMERCIAL

## 2021-10-18 DIAGNOSIS — Z48.01 ENCOUNTER FOR CHANGE OR REMOVAL OF SURGICAL WOUND DRESSING: Primary | ICD-10-CM

## 2021-10-18 PROCEDURE — 99207 PR NO CHARGE NURSE ONLY: CPT

## 2021-10-18 NOTE — PROGRESS NOTES
Pt returned to clinic for post surgery 1 week follow up bandage change. Pt has no complaints, denies pain. Bandage removed from right inner cheek, area cleansed with normal saline. Incision had two small opening in the middle and bottom on his incision closest to mouth. Slight amount of pinkness noted around incision.  Aquaphor and bandages were applied to open areas. Steri Stripes and tape applied over the rest of the incision.       Advised to watch for signs/sx of infection; spreading redness, drainage, odor, fever. Call or report promptly to clinic. Pt given written instructions and informed to rtc as needed. Patient verbalized understanding.     Kesha Macdonald LPN   10/18/2021

## 2021-10-18 NOTE — PATIENT INSTRUCTIONS
WOUND CARE INSTRUCTIONS  for  ONE WEEK AFTER SURGERY    1) Leave flat bandage on your skin for one week after today s bandage change.  2) In one week when you remove the bandage, you may resume your regular skin care routine, including washing with mild soap and water, applying moisturizer, make-up and sunscreen.    3) If there are any open or bleeding areas at the incision/graft site you should begin to cover the area with a bandage daily as follows:    1) Clean and dry the area with plain tap water using a Q-tip or sterile gauze pad.  2) Apply Polysporin or Bacitracin ointment to the open area.  3) Cover the wound with a band-aid or a sterile non-stick gauze pad and micropore paper tape.         SIGNS OF INFECTION  - If you notice any of these signs of infection, call your doctor right away: expanding redness around the wound.  - Yellow or greenish-colored pus or cloudy wound drainage.    - Red streaking spreading from the wound.  - Increased swelling, tenderness, or pain around the wound.   - Fever.    Please remember that yellow and clear drainage from a wound can be normal and related to normal wound healing.  Isolated drainage from a wound without a combination of the above features does not indicate infection.       *Once the bandages are removed, the scar will be red and firm (especially in the lip/chin area). This is normal and will fade in time. It might take 6-12 months for this to happen.     *Massaging the area will help the scar soften and fade quicker. Begin to massage the area one month after the bandages have been removed. To massage apply pressure directly and firmly over the scar with the fingertips and move in a circular motion. Massage the area for a few minutes several times a day. Continue to massage the site for several months.    *Approximately 6-8 weeks after surgery it is not uncommon to see the formation of  tender pimple-like  bump along the scar. This is normal. As the scar continues to  mature and the stitches underneath the skin begin to dissolve, this might occur. Do not pick or squeeze, this will resolve on it s own. Should one break open producing a small amount of drainage, apply Polysporin or Bacitracin ointment a few times a day until the wound is completely healed.    *Numbness in the surgical area is expected. It might take 12-18 months for the feeling to return to normal. During this time sensations of itchiness, tingling and occasional sharp pains might be noted. These feelings are normal and will subside once the nerves have completely healed.     IN CASE OF EMERGENCY: Dr Barnard [phone]   If you were seen in [place] call: [phone]  If you were seen in [place] call: [phone]    Wound Care     for __Right Inner Cheek__  ? Remove bandage in daily and begin wound care as follows:     1. Clean area with tap water using a Q tip or gauze pad, (shower / bathe normally)  2. Dry wound with Q tip or gauze pad  3. Apply Aquaphor, Vaseline, Polysporin or Bacitracin Ointment with a Q tip    Do NOT use Neosporin Ointment *  4. Cover the wound with a band-aid or nonstick gauze pad and paper tape.  5. Repeat wound care once a day until wound is completely healed.    It is an old wives tale that a wound heals better when it is exposed to air and allowed to dry out. The wound will heal faster with a better cosmetic result if it is kept moist with ointment and covered with a bandage.  Do not let the wound dry out.      Supplies Needed:                Qtips or gauze pads                Polysporin or Bacitracin Ointment                Bandaids or nonstick gauze pads and paper tape

## 2021-10-27 ENCOUNTER — LAB REQUISITION (OUTPATIENT)
Dept: LAB | Facility: CLINIC | Age: 66
End: 2021-10-27

## 2021-10-27 LAB — HBV SURFACE AB SERPL IA-ACNC: 27.94 M[IU]/ML

## 2021-10-27 PROCEDURE — 86481 TB AG RESPONSE T-CELL SUSP: CPT | Performed by: INTERNAL MEDICINE

## 2021-10-27 PROCEDURE — 86706 HEP B SURFACE ANTIBODY: CPT | Performed by: INTERNAL MEDICINE

## 2021-10-28 LAB
GAMMA INTERFERON BACKGROUND BLD IA-ACNC: 0.09 IU/ML
M TB IFN-G BLD-IMP: NEGATIVE
M TB IFN-G CD4+ BCKGRND COR BLD-ACNC: 9.91 IU/ML
MITOGEN IGNF BCKGRD COR BLD-ACNC: 0 IU/ML
MITOGEN IGNF BCKGRD COR BLD-ACNC: 0.01 IU/ML
QUANTIFERON MITOGEN: 10 IU/ML
QUANTIFERON NIL TUBE: 0.09 IU/ML
QUANTIFERON TB1 TUBE: 0.1 IU/ML
QUANTIFERON TB2 TUBE: 0.09

## 2022-01-13 DIAGNOSIS — B00.1 HERPES LABIALIS: ICD-10-CM

## 2022-01-16 RX ORDER — VALACYCLOVIR HYDROCHLORIDE 1 G/1
TABLET, FILM COATED ORAL
Qty: 16 TABLET | Refills: 5 | Status: SHIPPED | OUTPATIENT
Start: 2022-01-16 | End: 2024-08-26

## 2022-01-16 NOTE — TELEPHONE ENCOUNTER
Routing refill request to provider for review/approval because:  Labs not current:  No results found for: MIKALA

## 2022-02-25 ENCOUNTER — OFFICE VISIT (OUTPATIENT)
Dept: FAMILY MEDICINE | Facility: CLINIC | Age: 67
End: 2022-02-25
Payer: COMMERCIAL

## 2022-02-25 VITALS
HEIGHT: 73 IN | DIASTOLIC BLOOD PRESSURE: 80 MMHG | SYSTOLIC BLOOD PRESSURE: 136 MMHG | BODY MASS INDEX: 24.23 KG/M2 | HEART RATE: 70 BPM | OXYGEN SATURATION: 99 % | TEMPERATURE: 98.1 F | WEIGHT: 182.8 LBS

## 2022-02-25 DIAGNOSIS — Z85.828 HISTORY OF BASAL CELL CANCER: ICD-10-CM

## 2022-02-25 DIAGNOSIS — L98.9 SKIN LESION OF RIGHT LOWER EXTREMITY: Primary | ICD-10-CM

## 2022-02-25 PROCEDURE — 11104 PUNCH BX SKIN SINGLE LESION: CPT | Performed by: FAMILY MEDICINE

## 2022-02-25 PROCEDURE — 88305 TISSUE EXAM BY PATHOLOGIST: CPT | Performed by: PATHOLOGY

## 2022-02-25 NOTE — PROGRESS NOTES
Assessment & Plan       ICD-10-CM    1. Skin lesion of right lower extremity  L98.9 DC PUNCH BIOPSY OF SKIN, FIRST/SINGLE LESION     Surgical Pathology Exam   2. History of basal cell cancer  Z85.828      Routine wound care for the biopsy site  I will inform him of the biopsy result when available and a further plan then for treatment    Plan a tentative recheck in a couple of months with an annual physical along with fasting lab work and a Pneumovax 23    Return in about 9 weeks (around 4/29/2022) for Physical Exam, Lab Work, Routine Visit.    Federico Salas MD  [facility] TERA Nix is a 66 year old who presents for the following health issues     History of Present Illness     Reason for visit:  Skin lesion  Symptom onset:  1-2 weeks ago  Symptoms include:  Skin lesion not healing  Symptom intensity:  Moderate  Symptom progression:  Worsening  Had these symptoms before:  Yes  Has tried/received treatment for these symptoms:  Yes  Previous treatment was successful:  Yes  Prior treatment description:  Lesion removal  What makes it worse:  No  What makes it better:  No    He eats 0-1 servings of fruits and vegetables daily.He consumes 1 sweetened beverage(s) daily.He exercises with enough effort to increase his heart rate 10 to 19 minutes per day.  He exercises with enough effort to increase his heart rate 3 or less days per week.   He is taking medications regularly.       Skin Lesion  Onset/Duration: 1 Month  Description  Location: left lower  Color: brown and reddish  Border description: irregular border  Character: raised, red  Itching: mild and feels a little sore  Bleeding:  YES  Intensity:  mild  Progression of Symptoms:  worsening  Accompanying signs and symptoms:   Bleeding: YES  Scaling: YES  Excessive sun exposure/tanning: no  Sunscreen used: YES  History:           Any previous history of skin cancer: YES  Any family history of melanoma: no  Previous episodes of similar  "lesion: YES  Precipitating or alleviating factors: none  Therapies tried and outcome: topical steroid - Hydrocortisone 1 % and Neosporin    He has had numerous basal cell carcinomas in the past, including one on his right cheek that was removed in October via Mohs procedure.  He has noticed a sore on his right lower leg for the last month it is not healing.  He has tried cortisone cream and Neosporin, but it persists.    Patient Active Problem List   Diagnosis     Hyperlipidemia with target LDL less than 130     Advanced directives, counseling/discussion     History of basal cell cancer     Cataract of both eyes     Actinic keratoses     Erectile dysfunction, unspecified erectile dysfunction type     Right-sided low back pain without sciatica, unspecified chronicity     Posterior vitreous detachment of both eyes     Current Outpatient Medications   Medication     diclofenac (VOLTAREN) 1 % topical gel     sildenafil (REVATIO) 20 MG tablet     triprolidine-pseudoePHEDrine (APRODINE) 2.5-60 MG TABS per tablet     valACYclovir (VALTREX) 1000 mg tablet     No current facility-administered medications for this visit.         Review of Systems   Constitutional, HEENT, cardiovascular, pulmonary, gi and gu systems are negative, except as otherwise noted.      Objective    /80 (BP Location: Right arm, Patient Position: Sitting, Cuff Size: Adult Regular)   Pulse 70   Temp 98.1  F (36.7  C) (Oral)   Ht 1.845 m (6' 0.64\")   Wt 82.9 kg (182 lb 12.8 oz)   SpO2 99%   BMI 24.36 kg/m    Body mass index is 24.36 kg/m .  Physical Exam   GENERAL: healthy, alert and no distress  SKIN: He has an ill-defined roughly three-quarter centimeter diameter lesion on the outer aspect of his right lower leg above the ankle.  It is superficially ulcerated.  After discussion with the patient, we elected to biopsy it.  It was cleansed, anesthetized with 1% lidocaine with epinephrine, and then biopsied with a 3 mm punch.  Hemostasis was " obtained by direct pressure and then it was bandaged.

## 2022-03-01 LAB
PATH REPORT.COMMENTS IMP SPEC: ABNORMAL
PATH REPORT.COMMENTS IMP SPEC: ABNORMAL
PATH REPORT.COMMENTS IMP SPEC: YES
PATH REPORT.FINAL DX SPEC: ABNORMAL
PATH REPORT.GROSS SPEC: ABNORMAL
PATH REPORT.MICROSCOPIC SPEC OTHER STN: ABNORMAL
PATH REPORT.RELEVANT HX SPEC: ABNORMAL
PHOTO IMAGE: ABNORMAL

## 2022-03-01 NOTE — RESULT ENCOUNTER NOTE
Boyd,  I just tried reaching you by phone, but did not connect, so I left you a voicemail.  The biopsy on your right lower leg did show another basal cell cancer.  Due to the size and location of this skin cancer, please contact Dr. Barnard's office for removal of this.    Shravan

## 2022-03-14 NOTE — PROGRESS NOTES
Surgical Office Location :   [facility]  [address] [place] [address]

## 2022-03-15 ENCOUNTER — ANCILLARY PROCEDURE (OUTPATIENT)
Dept: CT IMAGING | Facility: CLINIC | Age: 67
End: 2022-03-15
Attending: STUDENT IN AN ORGANIZED HEALTH CARE EDUCATION/TRAINING PROGRAM
Payer: COMMERCIAL

## 2022-03-15 ENCOUNTER — OFFICE VISIT (OUTPATIENT)
Dept: FAMILY MEDICINE | Facility: CLINIC | Age: 67
End: 2022-03-15
Payer: COMMERCIAL

## 2022-03-15 ENCOUNTER — OFFICE VISIT (OUTPATIENT)
Dept: DERMATOLOGY | Facility: CLINIC | Age: 67
End: 2022-03-15
Payer: COMMERCIAL

## 2022-03-15 VITALS — OXYGEN SATURATION: 100 % | SYSTOLIC BLOOD PRESSURE: 145 MMHG | HEART RATE: 63 BPM | DIASTOLIC BLOOD PRESSURE: 74 MMHG

## 2022-03-15 VITALS
HEART RATE: 66 BPM | SYSTOLIC BLOOD PRESSURE: 129 MMHG | DIASTOLIC BLOOD PRESSURE: 83 MMHG | WEIGHT: 182.2 LBS | TEMPERATURE: 98.5 F | OXYGEN SATURATION: 99 % | BODY MASS INDEX: 24.28 KG/M2

## 2022-03-15 DIAGNOSIS — K43.9 VENTRAL HERNIA WITHOUT OBSTRUCTION OR GANGRENE: Primary | ICD-10-CM

## 2022-03-15 DIAGNOSIS — C44.712 BASAL CELL CARCINOMA (BCC) OF RIGHT LOWER LEG: Primary | ICD-10-CM

## 2022-03-15 DIAGNOSIS — K43.9 VENTRAL HERNIA WITHOUT OBSTRUCTION OR GANGRENE: ICD-10-CM

## 2022-03-15 PROCEDURE — 74177 CT ABD & PELVIS W/CONTRAST: CPT | Mod: TC | Performed by: RADIOLOGY

## 2022-03-15 PROCEDURE — 99213 OFFICE O/P EST LOW 20 MIN: CPT | Performed by: STUDENT IN AN ORGANIZED HEALTH CARE EDUCATION/TRAINING PROGRAM

## 2022-03-15 PROCEDURE — 99207 PR NO CHARGE LOS: CPT | Performed by: DERMATOLOGY

## 2022-03-15 PROCEDURE — 12001 RPR S/N/AX/GEN/TRNK 2.5CM/<: CPT | Performed by: DERMATOLOGY

## 2022-03-15 PROCEDURE — 17314 MOHS ADDL STAGE T/A/L: CPT | Performed by: DERMATOLOGY

## 2022-03-15 PROCEDURE — 17313 MOHS 1 STAGE T/A/L: CPT | Performed by: DERMATOLOGY

## 2022-03-15 RX ORDER — IOPAMIDOL 755 MG/ML
500 INJECTION, SOLUTION INTRAVASCULAR ONCE
Status: COMPLETED | OUTPATIENT
Start: 2022-03-15 | End: 2022-03-15

## 2022-03-15 RX ADMIN — Medication 60 ML: at 13:38

## 2022-03-15 RX ADMIN — IOPAMIDOL 100 ML: 755 INJECTION, SOLUTION INTRAVASCULAR at 13:38

## 2022-03-15 ASSESSMENT — PAIN SCALES - GENERAL: PAINLEVEL: NO PAIN (0)

## 2022-03-15 NOTE — LETTER
3/15/2022         RE: Micky Topete  [address] [id number]        Dear Colleague,    Thank you for referring your patient, Micky Topete, to the [facility]. Please see a copy of my visit note below.    Surgical Office Location :   [facility]  [address] [place] [address]      Micky Topete is an extremely pleasant 66 year old year old male patient here today for evaluation and managment of basal cell carcinoma on right lower leg.  Cheek healing well.  Patient has no other skin complaints today.  Remainder of the HPI, Meds, PMH, Allergies, FH, and SH was reviewed in chart.      Past Medical History:   Diagnosis Date     Actinic keratoses      Basal cell carcinoma of left lower extremity 05/2019    left anterior thigh     Basal cell carcinoma of left upper arm 05/2021    just above left elbow     Basal cell carcinoma of left upper extremity 11/2009    left forearm     Basal cell carcinoma of right upper extremity 03/2020    right upper outer arm     BCC (basal cell carcinoma), face 10/2021    removed via Mohs by Dr. Barnard     Cataract 09/20/2012     Hyperlipidemia LDL goal < 130      Right-sided low back pain without sciatica, unspecified chronicity age 27       Past Surgical History:   Procedure Laterality Date     APPENDECTOMY       BIOPSY      Left forearm, basal cell     COLONOSCOPY      Age 51     HEAD & NECK SURGERY      5th & 7th cervical disk bulge     SOFT TISSUE SURGERY      Rt shoulder bursitis, ligament stretch     VASECTOMY          Family History   Problem Relation Age of Onset     Diabetes Mother      Eye Disorder Mother      Glaucoma Mother      Macular Degeneration Mother      Cerebrovascular Disease Mother      Hypertension Father      Heart Disease Father      Cancer - colorectal Paternal Grandfather        Social History     Socioeconomic History     Marital status:      Spouse name: Odessa Prince  children: 2     Years of education: Not on file     Highest education level: Not on file   Occupational History     Employer: [employer]   Tobacco Use     Smoking status: Never Smoker     Smokeless tobacco: Never Used   Substance and Sexual Activity     Alcohol use: Yes     Drug use: No     Sexual activity: Yes     Partners: Female     Birth control/protection: Male Surgical     Comment: Having some ED issues   Other Topics Concern     Parent/sibling w/ CABG, MI or angioplasty before 65F 55M? No   Social History Narrative     Not on file     Social Determinants of Health     Financial Resource Strain: Not on file   Food Insecurity: Not on file   Transportation Needs: Not on file   Physical Activity: Not on file   Stress: Not on file   Social Connections: Not on file   Intimate Partner Violence: Not on file   Housing Stability: Not on file       Outpatient Encounter Medications as of 3/15/2022   Medication Sig Dispense Refill     diclofenac (VOLTAREN) 1 % topical gel Apply topically daily as needed for moderate pain       sildenafil (REVATIO) 20 MG tablet TAKE ON TO FOUR TABLETS BY MOUTH PRIOR TO INTERCOURSE AS NEEDED 20 tablet 5     triprolidine-pseudoePHEDrine (APRODINE) 2.5-60 MG TABS per tablet Take 1 tablet by mouth every 6 hours as needed for allergies 100 tablet 1     valACYclovir (VALTREX) 1000 mg tablet TAKE TWO TABLETS BY MOUTH NOW AND THEN 12 HOURS LATER AS NEEDED FOR COLD SORES 16 tablet 5     No facility-administered encounter medications on file as of 3/15/2022.             O:   NAD, WDWN, Alert & Oriented, Mood & Affect wnl, Vitals stable   Here today alone   BP (!) 145/74 (BP Location: Left arm, Patient Position: Sitting, Cuff Size: Adult Regular)   Pulse 63   SpO2 100%    General appearance normal   Vitals stable   Alert, oriented and in no acute distress     R lower leg 1.6cm red plaque       Eyes: Conjunctivae/lids:Normal     ENT: Lips, buccal mucosa, tongue:  normal    MSK:Normal    Cardiovascular: peripheral edema none    Pulm: Breathing Normal    Neuro/Psych: Orientation:Alert and Orientedx3 ; Mood/Affect:normal       A/P:  1. R lower leg basal cell carcinoma   MOHS:   Location    The rationale for Mohs surgery was discussed with the patient and consent was obtained.  The risks and benefits as well as alternatives to therapy were discussed, in detail.  Specifically, the risks of infection, scarring, bleeding, prolonged wound healing, incomplete removal, allergy to anesthesia, nerve injury and recurrence were addressed.  Indication for Mohs was Location. Prior to the procedure, the treatment site was clearly identified and, if available, confirmed with previous photos and confirmed by the patient   All components of the Universal Protocol/PAUSE rule were completed.  The Mohs surgeon operated in two distinct and integrated capacities as the surgeon and pathologist.      The area was prepped with Betasept.  A rim of normal appearing skin was marked circumferentially around the lesion.  The area was infiltrated with local anesthesia.  The tumor was first debulked to remove all clinically apparent tumor.  An incision following the standard Mohs approach was done and the specimen was oriented,mapped and placed in 2 block(s).  Each specimen was then chromacoded and processed in the Mohs laboratory using standard Mohs technique and submitted for frozen section histology.  Frozen section analysis showed  residual tumor but CLEAR MARGINS.    First stage:Orthokeratosis of epidermis with a proliferation of nests of basaloid cells, with peripheral palisading and a haphazard arrangement in the center extending into the dermis, .  The tumor cells have hyperchromatic nuclei. Poor cytoplasm and intercellular bridging.      The tumor was excised using standard Mohs technique in 2 stages(s).  CLEAR MARGINS OBTAINED and Final defect size was 2 x 2.2 cm.     We discussed the options for  wound management in full with the patient including risks/benefits/ possible outcomes.        REPAIR SECOND INTENT: We discussed the options for wound management in full with the patient including risks/benefits/possible outcomes. Decision made to allow the wound to heal by second intention. Cautery was used for for hemostasis. EBL minimal; complications none; wound care routine.  The patient was discharged in good condition and will return in one month or prn for wound evaluation.  It was a pleasure speaking to Micky Topete today.  Previous clinic notes and pertinent laboratory tests were reviewed prior to Micky Topete's visit.  Nature and genetics of benign skin lesions dicussed with patient.  Signs and Symptoms of skin cancer discussed with patient.  Patient encouraged to perform monthly skin exams.  UV precautions reviewed with patient.  Risks of non-melanoma skin cancer discussed with patient   Return to clinic 6 months        Again, thank you for allowing me to participate in the care of your patient.        Sincerely,        Joseph Barnard MD

## 2022-03-15 NOTE — NURSING NOTE
Chief Complaint   Patient presents with     Derm Problem     Mohs Franci M. Mcphee on 3/15/2022 at 7:17 AM

## 2022-03-15 NOTE — PROGRESS NOTES
Micky Topete is an extremely pleasant 66 year old year old male patient here today for evaluation and managment of basal cell carcinoma on right lower leg.  Cheek healing well.  Patient has no other skin complaints today.  Remainder of the HPI, Meds, PMH, Allergies, FH, and SH was reviewed in chart.      Past Medical History:   Diagnosis Date     Actinic keratoses      Basal cell carcinoma of left lower extremity 05/2019    left anterior thigh     Basal cell carcinoma of left upper arm 05/2021    just above left elbow     Basal cell carcinoma of left upper extremity 11/2009    left forearm     Basal cell carcinoma of right upper extremity 03/2020    right upper outer arm     BCC (basal cell carcinoma), face 10/2021    removed via Mohs by Dr. Barnard     Cataract 09/20/2012     Hyperlipidemia LDL goal < 130      Right-sided low back pain without sciatica, unspecified chronicity age 27       Past Surgical History:   Procedure Laterality Date     APPENDECTOMY       BIOPSY      Left forearm, basal cell     COLONOSCOPY      Age 51     HEAD & NECK SURGERY      5th & 7th cervical disk bulge     SOFT TISSUE SURGERY      Rt shoulder bursitis, ligament stretch     VASECTOMY          Family History   Problem Relation Age of Onset     Diabetes Mother      Eye Disorder Mother      Glaucoma Mother      Macular Degeneration Mother      Cerebrovascular Disease Mother      Hypertension Father      Heart Disease Father      Cancer - colorectal Paternal Grandfather        Social History     Socioeconomic History     Marital status:      Spouse name: Odessa     Number of children: 2     Years of education: Not on file     Highest education level: Not on file   Occupational History     Employer: [employer]   Tobacco Use     Smoking status: Never Smoker     Smokeless tobacco: Never Used   Substance and Sexual Activity     Alcohol use: Yes     Drug use: No     Sexual activity: Yes     Partners: Female     Birth  control/protection: Male Surgical     Comment: Having some ED issues   Other Topics Concern     Parent/sibling w/ CABG, MI or angioplasty before 65F 55M? No   Social History Narrative     Not on file     Social Determinants of Health     Financial Resource Strain: Not on file   Food Insecurity: Not on file   Transportation Needs: Not on file   Physical Activity: Not on file   Stress: Not on file   Social Connections: Not on file   Intimate Partner Violence: Not on file   Housing Stability: Not on file       Outpatient Encounter Medications as of 3/15/2022   Medication Sig Dispense Refill     diclofenac (VOLTAREN) 1 % topical gel Apply topically daily as needed for moderate pain       sildenafil (REVATIO) 20 MG tablet TAKE ON TO FOUR TABLETS BY MOUTH PRIOR TO INTERCOURSE AS NEEDED 20 tablet 5     triprolidine-pseudoePHEDrine (APRODINE) 2.5-60 MG TABS per tablet Take 1 tablet by mouth every 6 hours as needed for allergies 100 tablet 1     valACYclovir (VALTREX) 1000 mg tablet TAKE TWO TABLETS BY MOUTH NOW AND THEN 12 HOURS LATER AS NEEDED FOR COLD SORES 16 tablet 5     No facility-administered encounter medications on file as of 3/15/2022.             O:   NAD, WDWN, Alert & Oriented, Mood & Affect wnl, Vitals stable   Here today alone   BP (!) 145/74 (BP Location: Left arm, Patient Position: Sitting, Cuff Size: Adult Regular)   Pulse 63   SpO2 100%    General appearance normal   Vitals stable   Alert, oriented and in no acute distress     R lower leg 1.6cm red plaque       Eyes: Conjunctivae/lids:Normal     ENT: Lips, buccal mucosa, tongue: normal    MSK:Normal    Cardiovascular: peripheral edema none    Pulm: Breathing Normal    Neuro/Psych: Orientation:Alert and Orientedx3 ; Mood/Affect:normal       A/P:  1. R lower leg basal cell carcinoma   MOHS:   Location    The rationale for Mohs surgery was discussed with the patient and consent was obtained.  The risks and benefits as well as alternatives to therapy were  discussed, in detail.  Specifically, the risks of infection, scarring, bleeding, prolonged wound healing, incomplete removal, allergy to anesthesia, nerve injury and recurrence were addressed.  Indication for Mohs was Location. Prior to the procedure, the treatment site was clearly identified and, if available, confirmed with previous photos and confirmed by the patient   All components of the Universal Protocol/PAUSE rule were completed.  The Mohs surgeon operated in two distinct and integrated capacities as the surgeon and pathologist.      The area was prepped with Betasept.  A rim of normal appearing skin was marked circumferentially around the lesion.  The area was infiltrated with local anesthesia.  The tumor was first debulked to remove all clinically apparent tumor.  An incision following the standard Mohs approach was done and the specimen was oriented,mapped and placed in 2 block(s).  Each specimen was then chromacoded and processed in the Mohs laboratory using standard Mohs technique and submitted for frozen section histology.  Frozen section analysis showed  residual tumor but CLEAR MARGINS.    First stage:Orthokeratosis of epidermis with a proliferation of nests of basaloid cells, with peripheral palisading and a haphazard arrangement in the center extending into the dermis, .  The tumor cells have hyperchromatic nuclei. Poor cytoplasm and intercellular bridging.      The tumor was excised using standard Mohs technique in 2 stages(s).  CLEAR MARGINS OBTAINED and Final defect size was 2 x 2.2 cm.     We discussed the options for wound management in full with the patient including risks/benefits/ possible outcomes.        REPAIR SECOND INTENT: We discussed the options for wound management in full with the patient including risks/benefits/possible outcomes. Decision made to allow the wound to heal by second intention. Cautery was used for for hemostasis. EBL minimal; complications none; wound care routine.   The patient was discharged in good condition and will return in one month or prn for wound evaluation.  It was a pleasure speaking to Micky Topete today.  Previous clinic notes and pertinent laboratory tests were reviewed prior to Micky Topete's visit.  Nature and genetics of benign skin lesions dicussed with patient.  Signs and Symptoms of skin cancer discussed with patient.  Patient encouraged to perform monthly skin exams.  UV precautions reviewed with patient.  Risks of non-melanoma skin cancer discussed with patient   Return to clinic 6 months

## 2022-03-15 NOTE — PROGRESS NOTES
Assessment & Plan    1. Ventral hernia without obstruction or gangrene  No evidence of obstruction or incarceration.  - Adult General Surg Referral; Future  -  Reviewed chat, pt has not his kidney checked recently, will order Basic metabolic panel  (Ca, Cl, CO2, Creat, Gluc, K, Na, BUN); because of contrast  - CT Abdomen Pelvis w Contrast; Future  -Warning signs  discussed with pt. He knows to go the ED if sees any of these signs. See pt's instruction  Return in about 2 weeks (around 3/29/2022), or if symptoms worsen or fail to improve.    Carmen Esquivel MD  [facility] JUNE Nix is a 66 year old who presents for the following health issues     History of Present Illness       Reason for visit:  Abdominal bulge  Symptom onset:  1-2 weeks ago  Symptoms include:  Initial pain , now just surface bulge  Symptom intensity:  Mild  Symptom progression:  Staying the same  Had these symptoms before:  No  What makes it worse:  Pressure on area  What makes it better:  No    He eats 0-1 servings of fruits and vegetables daily.He consumes 1 sweetened beverage(s) daily.He exercises with enough effort to increase his heart rate 9 or less minutes per day.  He exercises with enough effort to increase his heart rate 3 or less days per week.   He is taking medications regularly.     Noticed it last week Wednesday.  He has been having regular bowel movement. No  Blood in stool. He noticed the bugle when he lying down. He had mild pain when he first noticed the bulge but not at this time. No nausea or vomiting. NO history of abdominal surgery  Review of Systems   Constitutional, HEENT, cardiovascular, pulmonary, gi and gu systems are negative, except as otherwise noted.      Objective    /83 (BP Location: Left arm, Cuff Size: Adult Large)   Pulse 66   Temp 98.5  F (36.9  C) (Tympanic)   Wt 82.6 kg (182 lb 3.2 oz)   SpO2 99%   BMI 24.28 kg/m    Body mass index is 24.28 kg/m .  Physical  Exam   GENERAL: healthy, alert and no distress  NECK: no adenopathy and thyroid normal to palpation  RESP:equal chest rise  ABDOMEN: soft, nontender, no hepatosplenomegaly, bugle between the epigastric and unbiblical palpable and noticeable when  supine , no evidence of obstruction or gangrene, no rebound tenderness or guarding,  and bowel sounds normal  MS: no gross musculoskeletal defects noted, no edema  SKIN: no suspicious lesions or rashes  NEURO:  speech normal  PSYCH: mentation appears normal, affect normal/bright

## 2022-03-15 NOTE — PATIENT INSTRUCTIONS
Open Wound Care     for ______________        ? No strenuous activity for 48 hours    ? Take Tylenol as needed for discomfort.                                                .         ? Do not drink alcoholic beverages for 48 hours.    ? Keep the pressure bandage in place for 24 hours. If the bandage becomes blood tinged or loose, reinforce it with gauze and tape.        (Refer to the reverse side of this page for management of bleeding).    ? Remove bandage in 24 hours and begin wound care as follows:     1. Clean area with tap water using a Q tip or gauze pad, (shower / bathe normally)  2. Dry wound with Q tip or gauze pad  3. Apply Aquaphor, Vaseline, Polysporin or Bacitracin Ointment with a Q tip    Do NOT use Neosporin Ointment *  4. Cover the wound with a band-aid or nonstick gauze pad and paper tape.  5. Repeat wound care once a day until wound is completely healed.    It is an old wives tale that a wound heals better when it is exposed to air and allowed to dry out. The wound will heal faster with a better cosmetic result if it is kept moist with ointment and covered with a bandage.  Do not let the wound dry out.      Supplies Needed:                Qtips or gauze pads                Polysporin or Bacitracin Ointment                Bandaids or nonstick gauze pads and paper tape    Wound care kits and brown paper tape are available for purchase at   the pharmacy.    BLEEDIN. Use tightly rolled up gauze or cloth to apply direct pressure over the bandage for 20   minutes.  2. Reapply pressure for an additional 20 minutes if necessary  3. Call the office or go to the nearest emergency room if pressure fails to stop the bleeding.  4. Use additional gauze and tape to maintain pressure once the bleeding has stopped.  5. Begin wound care 24 hours after surgery as directed.                  WOUND HEALING    1. One week after surgery a pink / red halo will form around the outside of the wound.   This is new  skin.  2. The center of the wound will appear yellowish white and produce some drainage.  3. The pink halo will slowly migrate in toward the center of the wound until the wound is covered with new shiny pink skin.  4. There will be no more drainage when the wound is completely healed.  5. It will take six months to one year for the redness to fade.  6. The scar may be itchy, tight and sensitive to extreme temperatures for a year after the surgery.  7. Massaging the area several times a day for several minutes after the wound is completely healed will help the scar soften and normalize faster. Begin massage only after healing is complete.      In case of emergency call: Dr Barnard: [phone]    [facility]: [phone]    [facility]:[phone]

## 2022-03-15 NOTE — PATIENT INSTRUCTIONS
Delfino Nix,    Thank you for allowing [facility] to manage your care.    Go to the ER If you have excruciating pain, nausea, vomiting or if the bugle is not going in.    I ordered a CT scan, please call diagnostic imaging [phone] to schedule your test.    I made a referral to a surgeon, they will be calling in approximately 1 week to set up your appointment.  If you do not hear from them, please call the specialty number on your after visit.     For your convenience, test results are released as soon as they are available  Please allow 1-2 business days for me to send you a comment about your results.  If not done so, I encourage you to login into [employer] ([email or web address] to review your results in real time.     If you have any questions or concerns, please feel free to call us at [phone].    Sincerely,    Dr. Esquivel    Did you know?      You can schedule a video visit for follow-up appointments as well as future appointments for certain conditions.  Please see the below link.     [email or web address]    If you have not already done so,  I encourage you to sign up for [employer] ([email or web address]).  This will allow you to review your results, securely communicate with a provider, and schedule virtual visits as well.    Patient Education     Hernia (Adult)    A hernia can happen when there is a weakness or defect in the wall of the abdomen or groin. Intestines or nearby tissues may move from their usual location and push through the weakness in the wall. This can cause a hernia (bulge) you may see or feel.  Causes and risk factors   A hernia may be present at birth. Or it may be caused by the wear and tear of daily living. Certain factors can make a hernia more likely. These can include:    Heavy lifting    Straining, whether from lifting, movement, or constipation    Chronic cough    Injury to the abdominal  wall    Excess weight    Pregnancy    Prior surgery    Older age    Family history of hernia  Symptoms  Symptoms of a hernia may come on suddenly. Or they may appear slowly over time. Some common symptoms include:    Bulge in the groin area, around the navel, or in the scrotum (the bulge may get bigger when you stand and go away when you lie down)    Pain or pressure around the bulge    Pain during activities such as lifting, coughing, or sneezing    A feeling of weakness or pressure in the groin    Pain or swelling in the scrotum  Types of hernias  There are different types of hernia. The type you have depends on its location:    Inguinal. This type is in the groin or scrotum. It is more common in men. But, women can get this hernia, too.    Femoral. This type is in the groin, upper thigh (where the leg bends), or labia. It is more common in women.    Ventral. This type is in the abdominal wall.    Umbilical. This type occurs around the navel (belly button).    Incisional. This type occurs at the site of a previous surgery.  The condition of the hernia can help determine how urgently it needs to be treated.    Reducible. It goes back in by itself, or it can be pushed back in.    Irreducible. It can t be pushed back in.    Incarcerated/strangulated. The intestine is trapped (incarcerated). If this happens, you won t be able to push the bulge back in. If the incarcerated hernia isn t treated, it may become strangulated. This means the area loses blood supply and the tissue may die. This requires emergency surgery. You need treatment right away.  In most cases, a hernia will not heal on its own.You may need surgery to repair the defect in the abdominal wall or groin. You ll be told more about surgery, if needed.  If your symptoms are not severe, treatment may sometimes be delayed. In such cases, you will need regular follow-up visits with the provider. You ll be asked to keep track of your symptoms and to watch for  signs of more serious problems. You may also be given guidelines similar to the home care instructions below.  Home care  To help keep a hernia from getting worse, you may be advised to:    Avoid heavy lifting and straining as directed.    Take steps to prevent constipation, such as eating more fiber and drinking more water. This may help reduce straining that can occur when having a bowel movement. Reducing straining may help keep your symptoms from getting worse.    Maintain a healthy weight or lose excess weight. This can help reduce strain on abdominal muscles and tissues.    Stop smoking. This can help prevent coughing that may also strain abdominal muscles and tissues.  Follow-up care  Follow up with your healthcare provider, or as directed. If imaging tests were done, they will be reviewed a doctor. You will be told the results and any new findings that may affect your care.  When to seek medical advice  Call your healthcare provider right away if any of these occur:    Hernia hardens, swells, or grows larger    Hernia can no longer be pushed back in    Pain moves to the lower right abdomen (just below the waistline), or spreads to the back  Call 911  Call 911 if any of these occur:    Severe pain, redness, or tenderness in the area near the hernia    Pain worsens quickly and doesn t get better    Inability to have a bowel movement or pass gas    Fever of 100.4 F (38 C) or higher, or as directed by your healthcare provider  Oscar last reviewed this educational content on 3/1/2018    2357-1048 [employer]. All rights reserved. This information is not intended as a substitute for professional medical care. Always follow your healthcare professional's instructions.

## 2022-03-23 ENCOUNTER — OFFICE VISIT (OUTPATIENT)
Dept: SURGERY | Facility: CLINIC | Age: 67
End: 2022-03-23
Attending: STUDENT IN AN ORGANIZED HEALTH CARE EDUCATION/TRAINING PROGRAM
Payer: COMMERCIAL

## 2022-03-23 VITALS
BODY MASS INDEX: 24.25 KG/M2 | HEART RATE: 63 BPM | WEIGHT: 182 LBS | DIASTOLIC BLOOD PRESSURE: 73 MMHG | SYSTOLIC BLOOD PRESSURE: 120 MMHG

## 2022-03-23 DIAGNOSIS — M62.08 DIASTASIS RECTI: Primary | ICD-10-CM

## 2022-03-23 DIAGNOSIS — K42.9 UMBILICAL HERNIA WITHOUT OBSTRUCTION AND WITHOUT GANGRENE: ICD-10-CM

## 2022-03-23 PROCEDURE — 99203 OFFICE O/P NEW LOW 30 MIN: CPT | Performed by: SURGERY

## 2022-03-23 NOTE — PROGRESS NOTES
Patient seen in consultation for hernia by Carmen Esquivel    HPI:  Patient is a 66 year old male  with complaints of bulge in upper abdomen  The patient noticed the symptoms about 3 weeks ago.    Had initial pain that then subsided, then noticed a lump or bulge  Flattens when up during the day but seems to notice more when supine/laying down  nothing makes the episode better.  Patient has family history of hernia problems in son    Review Of Systems    Skin: BCCs  Ears/Nose/Throat: negative  Respiratory: No shortness of breath, dyspnea on exertion, cough, or hemoptysis  Cardiovascular: negative  Gastrointestinal: negative  Genitourinary: negative  Musculoskeletal: as above  Neurologic: negative  Hematologic/Lymphatic/Immunologic: negative  Endocrine: negative      Past Medical History:   Diagnosis Date     Actinic keratoses      Basal cell carcinoma of left lower extremity 05/2019    left anterior thigh     Basal cell carcinoma of left upper arm 05/2021    just above left elbow     Basal cell carcinoma of left upper extremity 11/2009    left forearm     Basal cell carcinoma of right upper extremity 03/2020    right upper outer arm     BCC (basal cell carcinoma), face 10/2021    removed via Mohs by Dr. Barnard     Cataract 09/20/2012     Hyperlipidemia LDL goal < 130      Right-sided low back pain without sciatica, unspecified chronicity age 27       Past Surgical History:   Procedure Laterality Date     APPENDECTOMY       BIOPSY      Left forearm, basal cell     COLONOSCOPY      Age 51     HEAD & NECK SURGERY      5th & 7th cervical disk bulge     SOFT TISSUE SURGERY      Rt shoulder bursitis, ligament stretch     VASECTOMY     open appendectomy ~30 years ago    Social History     Socioeconomic History     Marital status:      Spouse name: Odessa     Number of children: 2     Years of education: Not on file     Highest education level: Not on file   Occupational History     Employer: [employer]   Tobacco Use     Smoking status: Never Smoker     Smokeless tobacco: Never Used   Substance and Sexual Activity     Alcohol use: Yes     Drug use: No     Sexual activity: Yes     Partners: Female     Birth control/protection: Male Surgical     Comment: Having some ED issues   Other Topics Concern     Parent/sibling w/ CABG, MI or angioplasty before 65F 55M? No   Social History Narrative     Not on file     Social Determinants of Health     Financial Resource Strain: Not on file   Food Insecurity: Not on file   Transportation Needs: Not on file   Physical Activity: Not on file   Stress: Not on file   Social Connections: Not on file   Intimate Partner Violence: Not on file   Housing Stability: Not on file       Current Outpatient Medications   Medication Sig Dispense Refill     diclofenac (VOLTAREN) 1 % topical gel Apply topically daily as needed for moderate pain       sildenafil (REVATIO) 20 MG tablet TAKE ON TO FOUR TABLETS BY MOUTH PRIOR TO INTERCOURSE AS NEEDED 20 tablet 5     triprolidine-pseudoePHEDrine (APRODINE) 2.5-60 MG TABS per tablet Take 1 tablet by mouth every 6 hours as needed for allergies 100 tablet 1     valACYclovir (VALTREX) 1000 mg tablet TAKE TWO TABLETS BY MOUTH NOW AND THEN 12 HOURS LATER AS NEEDED FOR COLD SORES 16 tablet 5       Medications and history reviewed    Physical exam:  Vitals: /73   Pulse 63   Wt 82.6 kg (182 lb)   BMI 24.25 kg/m    BMI= Body mass index is 24.25 kg/m .    Constitutional: healthy, alert and no distress  Head: Normocephalic. No masses, lesions, tenderness or abnormalities  Cardiovascular: negative, PMI normal. No lifts, heaves, or thrills. RRR. No murmurs, clicks gallops or rub  Respiratory: negative, Percussion normal. Good diaphragmatic excursion. Lungs clear  Gastrointestinal: Abdomen soft, non-tender. BS normal. No masses, organomegaly, positive findings: umbilical hernia, small and reducible. Diastasis recti, best seen when actively laying  down or doing partial situp.  : Deferred  Musculoskeletal: extremities normal- no gross deformities noted, gait normal and normal muscle tone  Skin: no suspicious lesions or rashes  Psychiatric: mentation appears normal and affect normal/bright  Patient able to get up on table without difficulty.      Imaging shows: personally reviewed. 2cm umbilical hernia with fat    CT ABDOMEN AND PELVIS WITH CONTRAST 3/15/2022 1:39 PM     CLINICAL HISTORY: Abdominal pain. Suspected ventral hernia.     TECHNIQUE: CT scan of the abdomen and pelvis was performed following  injection of IV contrast. Multiplanar reformats were obtained. Dose  reduction techniques were used.  CONTRAST: 100mL Isovue-370  COMPARISON: None.     FINDINGS:   LOWER CHEST: There is a 0.4 cm pulmonary nodule in the right middle  lobe (series 3 image 11).     HEPATOBILIARY: 1 cm cyst near the inferior tip of the right hepatic  lobe would typically require no routine follow-up. There is diffuse  fatty infiltration of the liver. Unremarkable gallbladder.     PANCREAS: Normal.     SPLEEN: Normal.     ADRENAL GLANDS: Normal.     KIDNEYS/BLADDER: Unremarkable. No hydronephrosis.     BOWEL: No bowel obstruction. Mild hazy fat stranding associated with a  small colonic diverticulum in the ascending colon (series 3 image 134)  is suspicious for acute diverticulitis. No associated fluid  collections to suggest diverticular abscess. Prior appendectomy.     PELVIC ORGANS: Unremarkable.     LYMPH NODES: No enlarged lymph nodes are identified in the abdomen or  pelvis.     VASCULATURE: Unremarkable.     ADDITIONAL FINDINGS: Small fat-containing paraumbilical hernia.     MUSCULOSKELETAL: A sclerotic lesion in the left iliac bone measures  1.1 cm, and most likely represents a bone island. No destructive bone  lesions are identified.                                                                      IMPRESSION:   1.  Mild acute diverticulitis in the ascending colon. No  associated  abscess.  2.  Diffuse fatty infiltration of the liver.    Assessment:     ICD-10-CM    1. Diastasis recti  M62.08    2. Umbilical hernia without obstruction and without gangrene  K42.9 Adult General Surg Referral     Plan: Area of concern appears to be the diastases.  This did have some initial pain or discomfort which is subsided.  CT done did show some mild a sending diverticulitis which may have been another source of the abdominal pain.  The umbilical hernia was not new to him and is likely been there for some time.  Discussed that the diastases is not her hernia but does increase chance of hernia formation in the area and in fact already does have the umbilical hernia.  Repair would be included with hernia repair if symptomatic.  As he is currently not feeling the pain he did initially we have opted for observation at this time.  If he starting to notice more symptoms then we can discuss surgery again.  Would approach this robotically to include both umbilical hernia and imbricate diastases and reinforce the entire area with mesh.  OK for normal activities. No restrictions needed.    Sav Stewart MD

## 2022-03-23 NOTE — LETTER
3/23/2022         RE: Micky Topete  [address] [facility] [id number]        Dear Colleague,    Thank you for referring your patient, Micky Topete, to the [facility]. Please see a copy of my visit note below.    Patient seen in consultation for hernia by Carmen Esquivel    HPI:  Patient is a 66 year old male  with complaints of bulge in upper abdomen  The patient noticed the symptoms about 3 weeks ago.    Had initial pain that then subsided, then noticed a lump or bulge  Flattens when up during the day but seems to notice more when supine/laying down  nothing makes the episode better.  Patient has family history of hernia problems in son    Review Of Systems    Skin: BCCs  Ears/Nose/Throat: negative  Respiratory: No shortness of breath, dyspnea on exertion, cough, or hemoptysis  Cardiovascular: negative  Gastrointestinal: negative  Genitourinary: negative  Musculoskeletal: as above  Neurologic: negative  Hematologic/Lymphatic/Immunologic: negative  Endocrine: negative      Past Medical History:   Diagnosis Date     Actinic keratoses      Basal cell carcinoma of left lower extremity 05/2019    left anterior thigh     Basal cell carcinoma of left upper arm 05/2021    just above left elbow     Basal cell carcinoma of left upper extremity 11/2009    left forearm     Basal cell carcinoma of right upper extremity 03/2020    right upper outer arm     BCC (basal cell carcinoma), face 10/2021    removed via Mohs by Dr. Barnard     Cataract 09/20/2012     Hyperlipidemia LDL goal < 130      Right-sided low back pain without sciatica, unspecified chronicity age 27       Past Surgical History:   Procedure Laterality Date     APPENDECTOMY       BIOPSY      Left forearm, basal cell     COLONOSCOPY      Age 51     HEAD & NECK SURGERY      5th & 7th cervical disk bulge     SOFT TISSUE SURGERY      Rt shoulder bursitis, ligament stretch     VASECTOMY     open appendectomy  ~30 years ago    Social History     Socioeconomic History     Marital status:      Spouse name: [place]     Number of children: 2     Years of education: Not on file     Highest education level: Not on file   Occupational History     Employer: [employer]   Tobacco Use     Smoking status: Never Smoker     Smokeless tobacco: Never Used   Substance and Sexual Activity     Alcohol use: Yes     Drug use: No     Sexual activity: Yes     Partners: Female     Birth control/protection: Male Surgical     Comment: Having some ED issues   Other Topics Concern     Parent/sibling w/ CABG, MI or angioplasty before 65F 55M? No   Social History Narrative     Not on file     Social Determinants of Health     Financial Resource Strain: Not on file   Food Insecurity: Not on file   Transportation Needs: Not on file   Physical Activity: Not on file   Stress: Not on file   Social Connections: Not on file   Intimate Partner Violence: Not on file   Housing Stability: Not on file       Current Outpatient Medications   Medication Sig Dispense Refill     diclofenac (VOLTAREN) 1 % topical gel Apply topically daily as needed for moderate pain       sildenafil (REVATIO) 20 MG tablet TAKE ON TO FOUR TABLETS BY MOUTH PRIOR TO INTERCOURSE AS NEEDED 20 tablet 5     triprolidine-pseudoePHEDrine (APRODINE) 2.5-60 MG TABS per tablet Take 1 tablet by mouth every 6 hours as needed for allergies 100 tablet 1     valACYclovir (VALTREX) 1000 mg tablet TAKE TWO TABLETS BY MOUTH NOW AND THEN 12 HOURS LATER AS NEEDED FOR COLD SORES 16 tablet 5       Medications and history reviewed    Physical exam:  Vitals: /73   Pulse 63   Wt 82.6 kg (182 lb)   BMI 24.25 kg/m    BMI= Body mass index is 24.25 kg/m .    Constitutional: healthy, alert and no distress  Head: Normocephalic. No masses, lesions, tenderness or abnormalities  Cardiovascular: negative, PMI normal. No lifts, heaves, or thrills. RRR. No murmurs, clicks gallops or  rub  Respiratory: negative, Percussion normal. Good diaphragmatic excursion. Lungs clear  Gastrointestinal: Abdomen soft, non-tender. BS normal. No masses, organomegaly, positive findings: umbilical hernia, small and reducible. Diastasis recti, best seen when actively laying down or doing partial situp.  : Deferred  Musculoskeletal: extremities normal- no gross deformities noted, gait normal and normal muscle tone  Skin: no suspicious lesions or rashes  Psychiatric: mentation appears normal and affect normal/bright  Patient able to get up on table without difficulty.      Imaging shows: personally reviewed. 2cm umbilical hernia with fat    CT ABDOMEN AND PELVIS WITH CONTRAST 3/15/2022 1:39 PM     CLINICAL HISTORY: Abdominal pain. Suspected ventral hernia.     TECHNIQUE: CT scan of the abdomen and pelvis was performed following  injection of IV contrast. Multiplanar reformats were obtained. Dose  reduction techniques were used.  CONTRAST: 100mL Isovue-370  COMPARISON: None.     FINDINGS:   LOWER CHEST: There is a 0.4 cm pulmonary nodule in the right middle  lobe (series 3 image 11).     HEPATOBILIARY: 1 cm cyst near the inferior tip of the right hepatic  lobe would typically require no routine follow-up. There is diffuse  fatty infiltration of the liver. Unremarkable gallbladder.     PANCREAS: Normal.     SPLEEN: Normal.     ADRENAL GLANDS: Normal.     KIDNEYS/BLADDER: Unremarkable. No hydronephrosis.     BOWEL: No bowel obstruction. Mild hazy fat stranding associated with a  small colonic diverticulum in the ascending colon (series 3 image 134)  is suspicious for acute diverticulitis. No associated fluid  collections to suggest diverticular abscess. Prior appendectomy.     PELVIC ORGANS: Unremarkable.     LYMPH NODES: No enlarged lymph nodes are identified in the abdomen or  pelvis.     VASCULATURE: Unremarkable.     ADDITIONAL FINDINGS: Small fat-containing paraumbilical hernia.     MUSCULOSKELETAL: A sclerotic  lesion in the left iliac bone measures  1.1 cm, and most likely represents a bone island. No destructive bone  lesions are identified.                                                                      IMPRESSION:   1.  Mild acute diverticulitis in the ascending colon. No associated  abscess.  2.  Diffuse fatty infiltration of the liver.    Assessment:     ICD-10-CM    1. Diastasis recti  M62.08    2. Umbilical hernia without obstruction and without gangrene  K42.9 Adult General Surg Referral     Plan: Area of concern appears to be the diastases.  This did have some initial pain or discomfort which is subsided.  CT done did show some mild a sending diverticulitis which may have been another source of the abdominal pain.  The umbilical hernia was not new to him and is likely been there for some time.  Discussed that the diastases is not her hernia but does increase chance of hernia formation in the area and in fact already does have the umbilical hernia.  Repair would be included with hernia repair if symptomatic.  As he is currently not feeling the pain he did initially we have opted for observation at this time.  If he starting to notice more symptoms then we can discuss surgery again.  Would approach this robotically to include both umbilical hernia and imbricate diastases and reinforce the entire area with mesh.  OK for normal activities. No restrictions needed.    Sav Stewart MD        Again, thank you for allowing me to participate in the care of your patient.        Sincerely,        Sav Stewart MD

## 2022-04-13 ENCOUNTER — OFFICE VISIT (OUTPATIENT)
Dept: DERMATOLOGY | Facility: CLINIC | Age: 67
End: 2022-04-13
Payer: COMMERCIAL

## 2022-04-13 VITALS — SYSTOLIC BLOOD PRESSURE: 142 MMHG | HEART RATE: 76 BPM | DIASTOLIC BLOOD PRESSURE: 81 MMHG | OXYGEN SATURATION: 97 %

## 2022-04-13 DIAGNOSIS — D22.9 MULTIPLE BENIGN NEVI: ICD-10-CM

## 2022-04-13 DIAGNOSIS — Z85.828 HISTORY OF BASAL CELL CANCER: ICD-10-CM

## 2022-04-13 DIAGNOSIS — D18.01 CHERRY ANGIOMA: ICD-10-CM

## 2022-04-13 DIAGNOSIS — L81.4 LENTIGO: Primary | ICD-10-CM

## 2022-04-13 DIAGNOSIS — L82.1 SEBORRHEIC KERATOSIS: ICD-10-CM

## 2022-04-13 DIAGNOSIS — L57.0 ACTINIC KERATOSIS: ICD-10-CM

## 2022-04-13 PROCEDURE — 99213 OFFICE O/P EST LOW 20 MIN: CPT | Mod: 25 | Performed by: PHYSICIAN ASSISTANT

## 2022-04-13 PROCEDURE — 17003 DESTRUCT PREMALG LES 2-14: CPT | Performed by: PHYSICIAN ASSISTANT

## 2022-04-13 PROCEDURE — 17000 DESTRUCT PREMALG LESION: CPT | Performed by: PHYSICIAN ASSISTANT

## 2022-04-13 ASSESSMENT — PAIN SCALES - GENERAL: PAINLEVEL: NO PAIN (0)

## 2022-04-13 NOTE — LETTER
4/13/2022         RE: Micky Topete  [address] [facility] [id number]        Dear Colleague,    Thank you for referring your patient, Micky Topete, to the [facility]. Please see a copy of my visit note below.    Micky Topete is an extremely pleasant 66 year old year old male patient here today for rough areas on face. Present for months. He denies any pain or bleeding. He recently had bcc removed from right leg. Patient has no other skin complaints today.  Remainder of the HPI, Meds, PMH, Allergies, FH, and SH was reviewed in chart.    Pertinent Hx:   History of BCC  BCC  On right cheek removed 10/21  BCC on left leg removed 10/21  BCC on right leg removed 3/22  Past Medical History:   Diagnosis Date     Actinic keratoses      Basal cell carcinoma of left lower extremity 05/2019    left anterior thigh     Basal cell carcinoma of left upper arm 05/2021    just above left elbow     Basal cell carcinoma of left upper extremity 11/2009    left forearm     Basal cell carcinoma of right upper extremity 03/2020    right upper outer arm     BCC (basal cell carcinoma), face 10/2021    removed via Mohs by Dr. Barnard     Cataract 09/20/2012     Hyperlipidemia LDL goal < 130      Right-sided low back pain without sciatica, unspecified chronicity age 27       Past Surgical History:   Procedure Laterality Date     APPENDECTOMY       BIOPSY      Left forearm, basal cell     COLONOSCOPY      Age 51     HEAD & NECK SURGERY      5th & 7th cervical disk bulge     SOFT TISSUE SURGERY      Rt shoulder bursitis, ligament stretch     VASECTOMY          Family History   Problem Relation Age of Onset     Diabetes Mother         Started early 60's     Eye Disorder Mother      Glaucoma Mother      Macular Degeneration Mother      Cerebrovascular Disease Mother      Skin Cancer Mother      Hypertension Father         Late onset after half-way     Heart Disease Father      Cancer -  colorectal Paternal Grandfather        Social History     Socioeconomic History     Marital status:      Spouse name: Odessa     Number of children: 2     Years of education: Not on file     Highest education level: Not on file   Occupational History     Employer: [employer]   Tobacco Use     Smoking status: Never Smoker     Smokeless tobacco: Never Used   Substance and Sexual Activity     Alcohol use: Yes     Drug use: No     Sexual activity: Yes     Partners: Female     Birth control/protection: Male Surgical     Comment: Having some ED issues   Other Topics Concern     Parent/sibling w/ CABG, MI or angioplasty before 65F 55M? No   Social History Narrative     Not on file     Social Determinants of Health     Financial Resource Strain: Not on file   Food Insecurity: Not on file   Transportation Needs: Not on file   Physical Activity: Not on file   Stress: Not on file   Social Connections: Not on file   Intimate Partner Violence: Not on file   Housing Stability: Not on file       Outpatient Encounter Medications as of 4/13/2022   Medication Sig Dispense Refill     diclofenac (VOLTAREN) 1 % topical gel Apply topically daily as needed for moderate pain       sildenafil (REVATIO) 20 MG tablet TAKE ON TO FOUR TABLETS BY MOUTH PRIOR TO INTERCOURSE AS NEEDED 20 tablet 5     triprolidine-pseudoePHEDrine (APRODINE) 2.5-60 MG TABS per tablet Take 1 tablet by mouth every 6 hours as needed for allergies 100 tablet 1     valACYclovir (VALTREX) 1000 mg tablet TAKE TWO TABLETS BY MOUTH NOW AND THEN 12 HOURS LATER AS NEEDED FOR COLD SORES 16 tablet 5     No facility-administered encounter medications on file as of 4/13/2022.             O:   NAD, WDWN, Alert & Oriented, Mood & Affect wnl, Vitals stable   Here today alone   BP (!) 142/81 (BP Location: Right arm, Patient Position: Sitting, Cuff Size: Adult Regular)   Pulse 76   SpO2 97%    General appearance normal   Vitals stable   Alert, oriented and in no  acute distress     Healing superficial wound on right leg  Gritty papules on face and ears   Stuck on papules and brown macules on trunk and ext   Red papules on trunk  Brown papules and macules with regular pigment network and borders on torso an extremities      The remainder of skin exam is normal    Eyes: Conjunctivae/lids:Normal     ENT: Lips: normal    MSK:Normal    Cardiovascular: peripheral edema none    Pulm: Breathing Normal    Neuro/Psych: Orientation:Alert and Orientedx3 ; Mood/Affect:normal   A/P:  1. Actinic keratoses on face  Discussed PDT, will schedule in fall with skin check.  2. Actinic keratoses on ears right ear x 1, left ear x 3  LN2:  Treated with LN2 for 5s for 1-2 cycles. Warned risks of blistering, pain, pigment change, scarring, and incomplete resolution.  Advised patient to return if lesions do not completely resolve.  Wound care sheet given.  3. Seborrheic keratosis, lentigo, angioma, benign nevi, History of BCC  BENIGN LESIONS DISCUSSED WITH PATIENT:  I discussed the specifics of tumor, prognosis, and genetics of benign lesions.  I explained that treatment of these lesions would be purely cosmetic and not medically neccessary.  I discussed with patient different removal options including excision, cautery and /or laser.      Nature and genetics of benign skin lesions dicussed with patient.  Signs and Symptoms of skin cancer discussed with patient.  ABCDEs of melanoma reviewed with patient.  Patient encouraged to perform monthly skin exams.  UV precautions reviewed with patient.  Risks of non-melanoma skin cancer discussed with patient   Return to clinic in 6 months.       Again, thank you for allowing me to participate in the care of your patient.        Sincerely,        Madhavi Yeboah PA-C

## 2022-04-13 NOTE — PROGRESS NOTES
Micky Topete is an extremely pleasant 66 year old year old male patient here today for rough areas on face. Present for months. He denies any pain or bleeding. He recently had bcc removed from right leg. Patient has no other skin complaints today.  Remainder of the HPI, Meds, PMH, Allergies, FH, and SH was reviewed in chart.    Pertinent Hx:   History of BCC  BCC  On right cheek removed 10/21  BCC on left leg removed 10/21  BCC on right leg removed 3/22  Past Medical History:   Diagnosis Date     Actinic keratoses      Basal cell carcinoma of left lower extremity 05/2019    left anterior thigh     Basal cell carcinoma of left upper arm 05/2021    just above left elbow     Basal cell carcinoma of left upper extremity 11/2009    left forearm     Basal cell carcinoma of right upper extremity 03/2020    right upper outer arm     BCC (basal cell carcinoma), face 10/2021    removed via Mohs by Dr. Barnard     Cataract 09/20/2012     Hyperlipidemia LDL goal < 130      Right-sided low back pain without sciatica, unspecified chronicity age 27       Past Surgical History:   Procedure Laterality Date     APPENDECTOMY       BIOPSY      Left forearm, basal cell     COLONOSCOPY      Age 51     HEAD & NECK SURGERY      5th & 7th cervical disk bulge     SOFT TISSUE SURGERY      Rt shoulder bursitis, ligament stretch     VASECTOMY          Family History   Problem Relation Age of Onset     Diabetes Mother         Started early 60's     Eye Disorder Mother      Glaucoma Mother      Macular Degeneration Mother      Cerebrovascular Disease Mother      Skin Cancer Mother      Hypertension Father         Late onset after FCI     Heart Disease Father      Cancer - colorectal Paternal Grandfather        Social History     Socioeconomic History     Marital status:      Spouse name: [place]     Number of children: 2     Years of education: Not on file     Highest education level: Not on file   Occupational History      Employer: [employer]   Tobacco Use     Smoking status: Never Smoker     Smokeless tobacco: Never Used   Substance and Sexual Activity     Alcohol use: Yes     Drug use: No     Sexual activity: Yes     Partners: Female     Birth control/protection: Male Surgical     Comment: Having some ED issues   Other Topics Concern     Parent/sibling w/ CABG, MI or angioplasty before 65F 55M? No   Social History Narrative     Not on file     Social Determinants of Health     Financial Resource Strain: Not on file   Food Insecurity: Not on file   Transportation Needs: Not on file   Physical Activity: Not on file   Stress: Not on file   Social Connections: Not on file   Intimate Partner Violence: Not on file   Housing Stability: Not on file       Outpatient Encounter Medications as of 4/13/2022   Medication Sig Dispense Refill     diclofenac (VOLTAREN) 1 % topical gel Apply topically daily as needed for moderate pain       sildenafil (REVATIO) 20 MG tablet TAKE ON TO FOUR TABLETS BY MOUTH PRIOR TO INTERCOURSE AS NEEDED 20 tablet 5     triprolidine-pseudoePHEDrine (APRODINE) 2.5-60 MG TABS per tablet Take 1 tablet by mouth every 6 hours as needed for allergies 100 tablet 1     valACYclovir (VALTREX) 1000 mg tablet TAKE TWO TABLETS BY MOUTH NOW AND THEN 12 HOURS LATER AS NEEDED FOR COLD SORES 16 tablet 5     No facility-administered encounter medications on file as of 4/13/2022.             O:   NAD, WDWN, Alert & Oriented, Mood & Affect wnl, Vitals stable   Here today alone   BP (!) 142/81 (BP Location: Right arm, Patient Position: Sitting, Cuff Size: Adult Regular)   Pulse 76   SpO2 97%    General appearance normal   Vitals stable   Alert, oriented and in no acute distress     Healing superficial wound on right leg  Gritty papules on face and ears   Stuck on papules and brown macules on trunk and ext   Red papules on trunk  Brown papules and macules with regular pigment network and borders on torso an extremities       The remainder of skin exam is normal    Eyes: Conjunctivae/lids:Normal     ENT: Lips: normal    MSK:Normal    Cardiovascular: peripheral edema none    Pulm: Breathing Normal    Neuro/Psych: Orientation:Alert and Orientedx3 ; Mood/Affect:normal   A/P:  1. Actinic keratoses on face  Discussed PDT, will schedule in fall with skin check.  2. Actinic keratoses on ears right ear x 1, left ear x 3  LN2:  Treated with LN2 for 5s for 1-2 cycles. Warned risks of blistering, pain, pigment change, scarring, and incomplete resolution.  Advised patient to return if lesions do not completely resolve.  Wound care sheet given.  3. Seborrheic keratosis, lentigo, angioma, benign nevi, History of BCC  BENIGN LESIONS DISCUSSED WITH PATIENT:  I discussed the specifics of tumor, prognosis, and genetics of benign lesions.  I explained that treatment of these lesions would be purely cosmetic and not medically neccessary.  I discussed with patient different removal options including excision, cautery and /or laser.      Nature and genetics of benign skin lesions dicussed with patient.  Signs and Symptoms of skin cancer discussed with patient.  ABCDEs of melanoma reviewed with patient.  Patient encouraged to perform monthly skin exams.  UV precautions reviewed with patient.  Risks of non-melanoma skin cancer discussed with patient   Return to clinic in 6 months.

## 2022-04-17 ENCOUNTER — MYC MEDICAL ADVICE (OUTPATIENT)
Dept: FAMILY MEDICINE | Facility: CLINIC | Age: 67
End: 2022-04-17
Payer: COMMERCIAL

## 2022-06-27 ENCOUNTER — OFFICE VISIT (OUTPATIENT)
Dept: OPHTHALMOLOGY | Facility: CLINIC | Age: 67
End: 2022-06-27
Payer: COMMERCIAL

## 2022-06-27 DIAGNOSIS — H43.813 POSTERIOR VITREOUS DETACHMENT OF BOTH EYES: ICD-10-CM

## 2022-06-27 DIAGNOSIS — H26.8 PSEUDOEXFOLIATION (PXF) OF LEFT LENS CAPSULE: ICD-10-CM

## 2022-06-27 DIAGNOSIS — H40.002 GLAUCOMA SUSPECT OF LEFT EYE: ICD-10-CM

## 2022-06-27 DIAGNOSIS — H52.4 PRESBYOPIA: ICD-10-CM

## 2022-06-27 DIAGNOSIS — Z01.01 ENCOUNTER FOR EXAMINATION OF EYES AND VISION WITH ABNORMAL FINDINGS: Primary | ICD-10-CM

## 2022-06-27 PROCEDURE — 92015 DETERMINE REFRACTIVE STATE: CPT | Performed by: OPHTHALMOLOGY

## 2022-06-27 PROCEDURE — 92014 COMPRE OPH EXAM EST PT 1/>: CPT | Performed by: OPHTHALMOLOGY

## 2022-06-27 RX ORDER — LATANOPROST 50 UG/ML
1 SOLUTION/ DROPS OPHTHALMIC EVERY EVENING
Qty: 2.5 ML | Refills: 11 | Status: SHIPPED | OUTPATIENT
Start: 2022-06-27 | End: 2023-08-22

## 2022-06-27 ASSESSMENT — REFRACTION_MANIFEST
OD_AXIS: 165
OD_ADD: +2.50
OS_AXIS: 140
OD_CYLINDER: +0.50
OS_CYLINDER: +0.50
OD_SPHERE: -2.50
OS_SPHERE: -2.75
OS_ADD: +2.25

## 2022-06-27 ASSESSMENT — EXTERNAL EXAM - LEFT EYE: OS_EXAM: NORMAL

## 2022-06-27 ASSESSMENT — VISUAL ACUITY
OS_CC: 20/20
CORRECTION_TYPE: GLASSES
METHOD: SNELLEN - LINEAR
OD_CC: 20/25
OD_CC+: -1
OS_CC+: -2

## 2022-06-27 ASSESSMENT — REFRACTION_WEARINGRX
OS_CYLINDER: SPHERE
SPECS_TYPE: PAL
OS_SPHERE: -3.00
OD_CYLINDER: +0.25
OS_ADD: +2.42
OS_ADD: +2.50
OS_CYLINDER: SPHERE
OD_ADD: +2.42
OD_AXIS: 013
OS_SPHERE: -2.50
OD_SPHERE: -3.00
SPECS_TYPE: PAL
OD_CYLINDER: SPHERE
OD_SPHERE: -2.75
OD_ADD: +2.50

## 2022-06-27 ASSESSMENT — CUP TO DISC RATIO
OS_RATIO: 0.3
OD_RATIO: 0.2

## 2022-06-27 ASSESSMENT — SLIT LAMP EXAM - LIDS
COMMENTS: NORMAL
COMMENTS: NORMAL

## 2022-06-27 ASSESSMENT — CONF VISUAL FIELD
OD_NORMAL: 1
OS_NORMAL: 1
METHOD: COUNTING FINGERS

## 2022-06-27 ASSESSMENT — TONOMETRY
OD_IOP_MMHG: 20
IOP_METHOD: APPLANATION
IOP_METHOD: APPLANATION
OD_IOP_MMHG: XX
OS_IOP_MMHG: 30
OS_IOP_MMHG: 29

## 2022-06-27 ASSESSMENT — EXTERNAL EXAM - RIGHT EYE: OD_EXAM: NORMAL

## 2022-06-27 NOTE — PROGRESS NOTES
Current Eye Medications:  Lutein and multivitamin daily     Subjective:  Complete eye exam. Vision could be a little better in the distance. Takes glasses of at near and that works well. No eye pain or discomfort in either eye.      Objective:  See Ophthalmology Exam.       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      ICD-10-CM    1. Encounter for examination of eyes and vision with abnormal findings  Z01.01    2. Presbyopia  H52.4    3. Glaucoma suspect of left eye  H40.002 latanoprost (XALATAN) 0.005 % ophthalmic solution   4. 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 Posterior vitreous detachment of both eyes  H43.813         Plan:  Start Latanoprost drop left eye every evening (discussed side effects).  Glasses Rx given - optional  May use artificial tears up to 4 times daily both eyes. (Refresh Tears, Systane Ultra/Balance, or Theratears)   Return visit 3 weeks for an intraocular pressure check, pachy, glaucoma OCT, retinal OCT, George Visual Field, and Gonio.   Jerardo Orta M.D.  [phone]

## 2022-06-27 NOTE — PATIENT INSTRUCTIONS
Start Latanoprost drop left eye every evening.  Glasses Rx given - optional  May use artificial tears up to 4 times daily both eyes. (Refresh Tears, Systane Ultra/Balance, or Theratears)   Return visit 3 weeks for an intraocular pressure check, pachy, glaucoma OCT, retinal OCT, George Visual Field, and Gonio.   Jerardo Orta M.D.  [phone]

## 2022-06-27 NOTE — LETTER
6/27/2022         RE: Micky Topete  [address] [facility] [address]        Dear Colleague,    Thank you for referring your patient, Micky Topete, to the [facility]. Please see a copy of my visit note below.     Current Eye Medications:  Lutein and multivitamin daily     Subjective:  Complete eye exam. Vision could be a little better in the distance. Takes glasses of at near and that works well. No eye pain or discomfort in either eye.      Objective:  See Ophthalmology Exam.       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      ICD-10-CM    1. Encounter for examination of eyes and vision with abnormal findings  Z01.01    2. Presbyopia  H52.4    3. Glaucoma suspect of left eye  H40.002 latanoprost (XALATAN) 0.005 % ophthalmic solution   4. 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 Posterior vitreous detachment of both eyes  H43.813         Plan:  Start Latanoprost drop left eye every evening (discussed side effects).  Glasses Rx given - optional  May use artificial tears up to 4 times daily both eyes. (Refresh Tears, Systane Ultra/Balance, or Theratears)   Return visit 3 weeks for an intraocular pressure check, pachy, glaucoma OCT, retinal OCT, George Visual Field, and Gonio.   Jerardo Orta M.D.  [phone]            Again, thank you for allowing me to participate in the care of your patient.        Sincerely,        Jerardo Orta MD

## 2022-07-02 ENCOUNTER — HEALTH MAINTENANCE LETTER (OUTPATIENT)
Age: 67
End: 2022-07-02

## 2022-07-02 PROBLEM — H40.002 GLAUCOMA SUSPECT OF LEFT EYE: Status: ACTIVE | Noted: 2022-07-02

## 2022-07-02 PROBLEM — H26.8 PSEUDOEXFOLIATION (PXF) OF LEFT LENS CAPSULE: Status: ACTIVE | Noted: 2022-07-02

## 2022-07-14 ASSESSMENT — ENCOUNTER SYMPTOMS
NAUSEA: 0
ARTHRALGIAS: 1
PARESTHESIAS: 0
HEMATURIA: 0
SHORTNESS OF BREATH: 0
DIZZINESS: 0
NERVOUS/ANXIOUS: 0
CHILLS: 0
FREQUENCY: 0
ABDOMINAL PAIN: 0
SORE THROAT: 0
WEAKNESS: 0
PALPITATIONS: 0
HEMATOCHEZIA: 0
DIARRHEA: 0
JOINT SWELLING: 1
HEARTBURN: 0
EYE PAIN: 0
HEADACHES: 0
MYALGIAS: 0
COUGH: 0
CONSTIPATION: 0
FEVER: 0
DYSURIA: 0

## 2022-07-14 ASSESSMENT — ACTIVITIES OF DAILY LIVING (ADL): CURRENT_FUNCTION: NO ASSISTANCE NEEDED

## 2022-07-21 ENCOUNTER — OFFICE VISIT (OUTPATIENT)
Dept: FAMILY MEDICINE | Facility: CLINIC | Age: 67
End: 2022-07-21
Payer: COMMERCIAL

## 2022-07-21 VITALS
HEIGHT: 72 IN | HEART RATE: 72 BPM | DIASTOLIC BLOOD PRESSURE: 78 MMHG | SYSTOLIC BLOOD PRESSURE: 106 MMHG | TEMPERATURE: 98.3 F | BODY MASS INDEX: 23.98 KG/M2 | WEIGHT: 177 LBS | OXYGEN SATURATION: 97 %

## 2022-07-21 DIAGNOSIS — Z23 NEED FOR PNEUMOCOCCAL VACCINATION: ICD-10-CM

## 2022-07-21 DIAGNOSIS — Z00.00 ENCOUNTER FOR MEDICARE ANNUAL WELLNESS EXAM: Primary | ICD-10-CM

## 2022-07-21 DIAGNOSIS — K42.9 UMBILICAL HERNIA WITHOUT OBSTRUCTION AND WITHOUT GANGRENE: ICD-10-CM

## 2022-07-21 DIAGNOSIS — Z85.828 HISTORY OF BASAL CELL CANCER: ICD-10-CM

## 2022-07-21 DIAGNOSIS — Z12.5 SCREENING FOR PROSTATE CANCER: ICD-10-CM

## 2022-07-21 DIAGNOSIS — M62.08 DIASTASIS OF RECTUS ABDOMINIS: ICD-10-CM

## 2022-07-21 LAB
ANION GAP SERPL CALCULATED.3IONS-SCNC: 3 MMOL/L (ref 3–14)
BUN SERPL-MCNC: 15 MG/DL (ref 7–30)
CALCIUM SERPL-MCNC: 9.2 MG/DL (ref 8.5–10.1)
CHLORIDE BLD-SCNC: 108 MMOL/L (ref 94–109)
CHOLEST SERPL-MCNC: 165 MG/DL
CO2 SERPL-SCNC: 32 MMOL/L (ref 20–32)
CREAT SERPL-MCNC: 0.99 MG/DL (ref 0.66–1.25)
ERYTHROCYTE [DISTWIDTH] IN BLOOD BY AUTOMATED COUNT: 12.9 % (ref 10–15)
FASTING STATUS PATIENT QL REPORTED: NORMAL
GFR SERPL CREATININE-BSD FRML MDRD: 84 ML/MIN/1.73M2
GLUCOSE BLD-MCNC: 99 MG/DL (ref 70–99)
HCT VFR BLD AUTO: 43.7 % (ref 40–53)
HDLC SERPL-MCNC: 47 MG/DL
HGB BLD-MCNC: 14.9 G/DL (ref 13.3–17.7)
LDLC SERPL CALC-MCNC: 93 MG/DL
MCH RBC QN AUTO: 30.6 PG (ref 26.5–33)
MCHC RBC AUTO-ENTMCNC: 34.1 G/DL (ref 31.5–36.5)
MCV RBC AUTO: 90 FL (ref 78–100)
NONHDLC SERPL-MCNC: 118 MG/DL
PLATELET # BLD AUTO: 210 10E3/UL (ref 150–450)
POTASSIUM BLD-SCNC: 4.5 MMOL/L (ref 3.4–5.3)
PSA SERPL-MCNC: 1.02 UG/L (ref 0–4)
RBC # BLD AUTO: 4.87 10E6/UL (ref 4.4–5.9)
SODIUM SERPL-SCNC: 143 MMOL/L (ref 133–144)
TRIGL SERPL-MCNC: 125 MG/DL
WBC # BLD AUTO: 7.2 10E3/UL (ref 4–11)

## 2022-07-21 PROCEDURE — G0103 PSA SCREENING: HCPCS | Performed by: FAMILY MEDICINE

## 2022-07-21 PROCEDURE — G0438 PPPS, INITIAL VISIT: HCPCS | Performed by: FAMILY MEDICINE

## 2022-07-21 PROCEDURE — 80061 LIPID PANEL: CPT | Performed by: FAMILY MEDICINE

## 2022-07-21 PROCEDURE — 90677 PCV20 VACCINE IM: CPT | Performed by: FAMILY MEDICINE

## 2022-07-21 PROCEDURE — 85027 COMPLETE CBC AUTOMATED: CPT | Performed by: FAMILY MEDICINE

## 2022-07-21 PROCEDURE — 36415 COLL VENOUS BLD VENIPUNCTURE: CPT | Performed by: FAMILY MEDICINE

## 2022-07-21 PROCEDURE — 80048 BASIC METABOLIC PNL TOTAL CA: CPT | Performed by: FAMILY MEDICINE

## 2022-07-21 PROCEDURE — G0009 ADMIN PNEUMOCOCCAL VACCINE: HCPCS | Performed by: FAMILY MEDICINE

## 2022-07-21 ASSESSMENT — ACTIVITIES OF DAILY LIVING (ADL): CURRENT_FUNCTION: NO ASSISTANCE NEEDED

## 2022-07-21 ASSESSMENT — ENCOUNTER SYMPTOMS
HEADACHES: 0
HEMATURIA: 0
FEVER: 0
DIZZINESS: 0
DYSURIA: 0
WEAKNESS: 0
DIARRHEA: 0
COUGH: 0
SHORTNESS OF BREATH: 0
PALPITATIONS: 0
CONSTIPATION: 0
CHILLS: 0
PARESTHESIAS: 0
ARTHRALGIAS: 1
SORE THROAT: 0
JOINT SWELLING: 1
NAUSEA: 0
MYALGIAS: 0
NERVOUS/ANXIOUS: 0
ABDOMINAL PAIN: 0
HEARTBURN: 0
HEMATOCHEZIA: 0
FREQUENCY: 0
EYE PAIN: 0

## 2022-07-21 ASSESSMENT — PAIN SCALES - GENERAL: PAINLEVEL: NO PAIN (0)

## 2022-07-21 NOTE — PROGRESS NOTES
"SUBJECTIVE:   Micky Topete is a 67 year old male who presents for a Preventive Visit.      Patient has been advised of split billing requirements and indicates understanding: Yes  Are you in the first 12 months of your Medicare coverage?      Healthy Habits:     In general, how would you rate your overall health?  Good    Duration of exercise:  Less than 15 minutes    Do you usually eat at least 4 servings of fruit and vegetables a day, include whole grains    & fiber and avoid regularly eating high fat or \"junk\" foods?  No    Taking medications regularly:  Yes    Medication side effects:  None    Ability to successfully perform activities of daily living:  No assistance needed    Home Safety:  Lack of grab bars in the bathroom    Hearing Impairment:  No hearing concerns    In the past 6 months, have you been bothered by leaking of urine?  No    In general, how would you rate your overall mental or emotional health?  Good      PHQ-2 Total Score: 0    Additional concerns today:  No    Do you feel safe in your environment? Yes    Have you ever done Advance Care Planning? (For example, a Health Directive, POLST, or a discussion with a medical provider or your loved ones about your wishes): No, advance care planning information given to patient to review.  Patient plans to discuss their wishes with loved ones or provider.         Fall risk  Fallen 2 or more times in the past year?: No  Any fall with injury in the past year?: No    Cognitive Screening   1) Repeat 3 items (Leader, Season, Table)    2) Clock draw: NORMAL  3) 3 item recall: Recalls 3 objects  Results: NORMAL clock, 1-2 items recalled: COGNITIVE IMPAIRMENT LESS LIKELY    Mini-CogTM Copyright JELENA Be. Licensed by the author for use in [facility]; reprinted with permission (agustín@.[facility]). All rights reserved.        Reviewed and updated as needed this visit by clinical staff   Tobacco  Allergies  Meds   Med Hx  Surg Hx  Fam Hx  Soc Hx " "         Reviewed and updated as needed this visit by Provider                   Social History     Tobacco Use     Smoking status: Never Smoker     Smokeless tobacco: Never Used   Substance Use Topics     Alcohol use: Yes     If you drink alcohol do you typically have >3 drinks per day or >7 drinks per week? No    Alcohol Use 7/14/2022   Prescreen: >3 drinks/day or >7 drinks/week? No   Prescreen: >3 drinks/day or >7 drinks/week? -     He takes a few \"prn\" meds as listed below.  He is also on an eyedrop daily as listed below.  He generally feels well.  He is seeing dermatology now on a regular basis because of his history of numerous skin cancers.  He was seen in April and will be seen again in October by them.  He was also seen by general surgery for diastases rectus of his abdomen and an umbilical hernia.  Those things are basically asymptomatic, so they are being followed.  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    Current providers sharing in care for this patient include:   Patient Care Team:  Federico Salas MD as PCP - General  Federico Salas MD as Assigned PCP  Joseph Barnard MD as MD (Dermatology)  Joseph Barnard MD as Assigned Surgical Provider    The following health maintenance items are reviewed in Epic and correct as of today:  Health Maintenance Due   Topic Date Due     Pneumococcal Vaccine: 65+ Years (1 - PCV) Never done     COVID-19 Vaccine (3 - Booster for Stevenson series) 03/14/2022     MEDICARE ANNUAL WELLNESS VISIT  04/28/2022     ANNUAL REVIEW OF HM ORDERS  04/28/2022     Patient Active Problem List   Diagnosis     Hyperlipidemia with target LDL less than 130     Advanced directives, counseling/discussion     History of basal cell cancer     Cataract of both eyes     Actinic keratoses     Erectile dysfunction, unspecified erectile dysfunction type     Right-sided low back pain without " sciatica, unspecified chronicity     Posterior vitreous detachment of both eyes     Glaucoma suspect of left eye - treated     Pseudoexfoliation (PXF) of left lens capsule     Umbilical hernia without obstruction and without gangrene     Past Surgical History:   Procedure Laterality Date     APPENDECTOMY       BIOPSY      Left forearm, basal cell     COLONOSCOPY      Age 51     HEAD & NECK SURGERY      5th & 7th cervical disk bulge     SOFT TISSUE SURGERY      Rt shoulder bursitis, ligament stretch     VASECTOMY         Social History     Tobacco Use     Smoking status: Never Smoker     Smokeless tobacco: Never Used   Substance Use Topics     Alcohol use: Yes     Family History   Problem Relation Age of Onset     Diabetes Mother         Started early 60's     Eye Disorder Mother      Glaucoma Mother      Macular Degeneration Mother      Cerebrovascular Disease Mother      Skin Cancer Mother      Hypertension Father         Late onset after [facility]     Heart Disease Father      Cancer - colorectal Paternal Grandfather      Macular Degeneration Sister          Current Outpatient Medications   Medication Sig Dispense Refill     diclofenac (VOLTAREN) 1 % topical gel Apply topically daily as needed for moderate pain       latanoprost (XALATAN) 0.005 % ophthalmic solution Place 1 drop Into the left eye every evening 2.5 mL 11     sildenafil (REVATIO) 20 MG tablet TAKE 1-4 TABLETS BY MOUTH AS NEEDED PRIOR TO INTERCOURSE. (DO NOT TAKE WITH NITROGLYCERIN, TERAZOSIN, OR DOXAZOSIN) 20 tablet 5     valACYclovir (VALTREX) 1000 mg tablet TAKE TWO TABLETS BY MOUTH NOW AND THEN 12 HOURS LATER AS NEEDED FOR COLD SORES 16 tablet 5     triprolidine-pseudoePHEDrine (APRODINE) 2.5-60 MG TABS per tablet Take 1 tablet by mouth every 6 hours as needed for allergies (Patient not taking: Reported on 7/21/2022) 100 tablet 1     Allergies   Allergen Reactions     Sulfa Drugs          Review of Systems   Constitutional: Negative for chills  "and fever.   HENT: Negative for congestion, ear pain, hearing loss and sore throat.    Eyes: Positive for visual disturbance. Negative for pain.   Respiratory: Negative for cough and shortness of breath.    Cardiovascular: Negative for chest pain, palpitations and peripheral edema.   Gastrointestinal: Negative for abdominal pain, constipation, diarrhea, heartburn, hematochezia and nausea.   Genitourinary: Positive for impotence. Negative for dysuria, frequency, genital sores, hematuria, penile discharge and urgency.   Musculoskeletal: Positive for arthralgias and joint swelling. Negative for myalgias.   Skin: Negative for rash.   Neurological: Negative for dizziness, weakness, headaches and paresthesias.   Psychiatric/Behavioral: Negative for mood changes. The patient is not nervous/anxious.      He uses diclofenac gel on his hands at times for arthritis.    OBJECTIVE:   /78 (BP Location: Left arm, Patient Position: Sitting, Cuff Size: Adult Regular)   Pulse 72   Temp 98.3  F (36.8  C) (Oral)   Ht 1.835 m (6' 0.24\")   Wt 80.3 kg (177 lb)   SpO2 97%   BMI 23.84 kg/m   Estimated body mass index is 23.84 kg/m  as calculated from the following:    Height as of this encounter: 1.835 m (6' 0.24\").    Weight as of this encounter: 80.3 kg (177 lb).  Physical Exam  GENERAL: healthy, alert and no distress  EYES: Eyes grossly normal to inspection, PERRL and conjunctivae and sclerae normal  HENT: ear canals and TM's normal, nose and mouth without ulcers or lesions  NECK: no adenopathy, no asymmetry, masses, or scars and thyroid normal to palpation  RESP: lungs clear to auscultation - no rales, rhonchi or wheezes  CV: regular rate and rhythm, normal S1 S2, no S3 or S4, no murmur, click or rub, no peripheral edema and peripheral pulses strong  ABDOMEN: soft, nontender, no hepatosplenomegaly, no masses; he has a small 1 to 2 cm diameter umbilical hernia as well as diastases rectus of the abdomen  MS: no gross " "musculoskeletal defects noted, no edema  SKIN: no suspicious lesions or rashes  NEURO: Normal strength and tone, mentation intact and speech normal  PSYCH: mentation appears normal, affect normal/bright    Diagnostic Test Results:  Labs reviewed in Epic    ASSESSMENT / PLAN:       ICD-10-CM    1. Encounter for Medicare annual wellness exam  Z00.00 Basic metabolic panel     Lipid panel reflex to direct LDL Fasting     CBC with platelets   2. History of basal cell cancer  Z85.828    3. Diastasis of rectus abdominis  M62.08    4. Umbilical hernia without obstruction and without gangrene  K42.9    5. Screening for prostate cancer  Z12.5 Prostate Specific Antigen Screen   6. Need for pneumococcal vaccination  Z23 Pneumococcal 20 Valent Conjugate (PCV20)     Blood pressure and other vital signs look good  He was encouraged to continue his good health habits  We will check fasting lab work today as above  Continue routine dermatology follow-up  We will give him a Prevnar 20 vaccine today  He declines a second COVID-vaccine booster at this time  Plan a tentative recheck in 1 year, or sooner prn    COUNSELING:  Reviewed preventive health counseling, as reflected in patient instructions       Regular exercise       Healthy diet/nutrition       Immunizations    Vaccinated for: Pneumococcal          Estimated body mass index is 23.84 kg/m  as calculated from the following:    Height as of this encounter: 1.835 m (6' 0.24\").    Weight as of this encounter: 80.3 kg (177 lb).        He reports that he has never smoked. He has never used smokeless tobacco.      Appropriate preventive services were discussed with this patient, including applicable screening as appropriate for cardiovascular disease, diabetes, osteopenia/osteoporosis, and glaucoma.  As appropriate for age/gender, discussed screening for colorectal cancer, prostate cancer, breast cancer, and cervical cancer. Checklist reviewing preventive services available has been " given to the patient.    Reviewed patients plan of care and provided an AVS. The Basic Care Plan (routine screening as documented in Health Maintenance) for Micky meets the Care Plan requirement. This Care Plan has been established and reviewed with the Patient.    Counseling Resources:  ATP IV Guidelines  Pooled Cohorts Equation Calculator  Breast Cancer Risk Calculator  Breast Cancer: Medication to Reduce Risk  FRAX Risk Assessment  ICSI Preventive Guidelines  Dietary Guidelines for Americans, 2010  [employer]'s MyPlate  ASA Prophylaxis  Lung CA Screening    Federico Salas MD  [facility]    Identified Health Risks:

## 2022-07-21 NOTE — RESULT ENCOUNTER NOTE
Boyd,  Your lab results look wonderful -- they are all nice and normal including lipid values, blood counts, electrolytes, kidney tests, blood sugar, and PSA.  Keep up the good work!    Shravan

## 2022-07-21 NOTE — PATIENT INSTRUCTIONS
Patient Education   Personalized Prevention Plan  You are due for the preventive services outlined below.  Your care team is available to assist you in scheduling these services.  If you have already completed any of these items, please share that information with your care team to update in your medical record.  Health Maintenance Due   Topic Date Due     Pneumococcal Vaccine (1 - PCV) Never done     COVID-19 Vaccine (3 - Booster for Stevenson series) 03/14/2022     Annual Wellness Visit  04/28/2022     ANNUAL REVIEW OF HM ORDERS  04/28/2022

## 2022-08-03 ENCOUNTER — OFFICE VISIT (OUTPATIENT)
Dept: OPHTHALMOLOGY | Facility: CLINIC | Age: 67
End: 2022-08-03
Payer: COMMERCIAL

## 2022-08-03 DIAGNOSIS — H26.8 PSEUDOEXFOLIATION (PXF) OF LEFT LENS CAPSULE: ICD-10-CM

## 2022-08-03 DIAGNOSIS — H40.002 GLAUCOMA SUSPECT OF LEFT EYE: Primary | ICD-10-CM

## 2022-08-03 PROCEDURE — 92020 GONIOSCOPY: CPT | Performed by: OPHTHALMOLOGY

## 2022-08-03 PROCEDURE — 76514 ECHO EXAM OF EYE THICKNESS: CPT | Performed by: OPHTHALMOLOGY

## 2022-08-03 PROCEDURE — 92133 CPTRZD OPH DX IMG PST SGM ON: CPT | Performed by: OPHTHALMOLOGY

## 2022-08-03 PROCEDURE — 92012 INTRM OPH EXAM EST PATIENT: CPT | Performed by: OPHTHALMOLOGY

## 2022-08-03 PROCEDURE — 92083 EXTENDED VISUAL FIELD XM: CPT | Performed by: OPHTHALMOLOGY

## 2022-08-03 ASSESSMENT — VISUAL ACUITY
OD_CC: 20/30
OS_CC: 20/25
CORRECTION_TYPE: GLASSES
OD_CC+: +2
METHOD: SNELLEN - LINEAR
OS_CC+: +2

## 2022-08-03 ASSESSMENT — PACHYMETRY
OS_CT(UM): .550
OD_CT(UM): .557

## 2022-08-03 ASSESSMENT — GONIOSCOPY
OD_TEMPORAL: GRADE 2-3
OD_NASAL: GRADE 2-3
OS_SUPERIOR: GRADE 2-3
OS_INFERIOR: GRADE 2-3
OD_SUPERIOR: GRADE 2-3
OS_TEMPORAL: GRADE 2-3
OS_NASAL: GRADE 2-3
OD_INFERIOR: GRADE 2-3

## 2022-08-03 ASSESSMENT — EXTERNAL EXAM - RIGHT EYE: OD_EXAM: NORMAL

## 2022-08-03 ASSESSMENT — SLIT LAMP EXAM - LIDS
COMMENTS: NORMAL
COMMENTS: NORMAL

## 2022-08-03 ASSESSMENT — TONOMETRY
OS_IOP_MMHG: 20
OD_IOP_MMHG: 20
IOP_METHOD: APPLANATION

## 2022-08-03 ASSESSMENT — EXTERNAL EXAM - LEFT EYE: OS_EXAM: NORMAL

## 2022-08-03 NOTE — PROGRESS NOTES
Current Eye Medications:  Latanoprost drop left eye every evening      Subjective:  Here for an intraocular pressure check, pachy, glaucoma OCT, retinal OCT, George Visual Field, and Gonio. Some irritation, but not bad at all     Objective:  See Ophthalmology Exam.       Assessment:  Good initial response to Latanoprost left eye.  Baseline glaucoma OCT and George Visual Field within normal limits both eyes.      Plan: Continue Latanoprost every evening left eye.  Return visit in 5 months for intraocular pressure check.  Jerardo Orta M.D.  [phone]

## 2022-08-03 NOTE — PATIENT INSTRUCTIONS
Continue Latanoprost every evening left eye.  Return visit in 5 months for intraocular pressure check.  Jerardo Orta M.D.  [phone]

## 2022-10-14 ENCOUNTER — OFFICE VISIT (OUTPATIENT)
Dept: DERMATOLOGY | Facility: CLINIC | Age: 67
End: 2022-10-14
Payer: COMMERCIAL

## 2022-10-14 DIAGNOSIS — D22.9 MULTIPLE BENIGN NEVI: ICD-10-CM

## 2022-10-14 DIAGNOSIS — L81.4 LENTIGO: ICD-10-CM

## 2022-10-14 DIAGNOSIS — L57.0 ACTINIC KERATOSIS: ICD-10-CM

## 2022-10-14 DIAGNOSIS — L82.1 SEBORRHEIC KERATOSIS: ICD-10-CM

## 2022-10-14 DIAGNOSIS — D18.01 CHERRY ANGIOMA: ICD-10-CM

## 2022-10-14 DIAGNOSIS — Z85.828 HISTORY OF BASAL CELL CANCER: Primary | ICD-10-CM

## 2022-10-14 PROCEDURE — 96567 PDT DSTR PRMLG LES SKN: CPT | Performed by: PHYSICIAN ASSISTANT

## 2022-10-14 PROCEDURE — 99213 OFFICE O/P EST LOW 20 MIN: CPT | Mod: 25 | Performed by: PHYSICIAN ASSISTANT

## 2022-10-14 ASSESSMENT — PAIN SCALES - GENERAL: PAINLEVEL: NO PAIN (0)

## 2022-10-14 NOTE — PATIENT INSTRUCTIONS
Possible side effects  Photosensitivity reactions: Reactions caused by light can show up on the skin where the drug is applied. They usually involve redness and a tingling or burning sensation. For about 2 days after the drug is used, you should take care to not expose treated areas of your face and scalp to light.  Stay out of strong, direct light.   Stay indoors as much as possible.   Wear protective clothing and wide-brimmed hats to avoid sunlight when outdoors.   Avoid beaches, snow, light colored concrete, or other surfaces where strong light may be reflected.  Sunscreens will not protect the skin from photosensitivity reactions.  Skin changes: The treated skin will likely turn red and may swell after treatment. Maybe mild or severe. Can use solaracine, aquaphor or OTC hydrocortisone to help with the irritation after treatment. Can use cool compresses if uncomfortable. If needed you can take tylenol or ibuprofen to help with your discomfort, as long as these medications are considered safe for you. This usually peaks about a day after treatment and gets better within a week. It should be gone about 4 weeks after treatment. The skin may also be itchy or change color after treatment.  Recommended General Skin care:   Eliminate harsh soaps, i.e. Dial, Zest, Giselle spring.  Use mild soaps such as Cetaphil or Dove sensitive skin.  Avoid overly hot or cold showers.  Use Vanicream, Cetaphil, Eucerin or Cerave creams to moisturize your skin.  Scoop-able creams are better than thin lotions.  Apply moisturizer immediately to damp skin after patting skin dry with towel.   American Academy of D[employer]atology Public inFormation Center:   Informative resources for the public to learn more about   skin conditions, tips on performing self-examinations, sun   safety, and more The public can find information online at   www aad org or by calling [phone] ([phone])

## 2022-10-14 NOTE — LETTER
10/14/2022         RE: Micky Topete  [address] [id number]        Dear Colleague,    Thank you for referring your patient, Micky Topete, to the [facility]. Please see a copy of my visit note below.    Micky Topete is an extremely pleasant 67 year old year old male patient here today for skin check and PDT. He notes rough areas on face. No painful or bleeding skin lesions. Patient has no other skin complaints today.  Remainder of the HPI, Meds, PMH, Allergies, FH, and SH was reviewed in chart.    Pertinent Hx:        History of BCC  BCC  On right cheek removed 10/21  BCC on left leg removed 10/21  BCC on right leg removed 3/22  Past Medical History:   Diagnosis Date     Actinic keratoses      Basal cell carcinoma of left lower extremity 05/2019    left anterior thigh     Basal cell carcinoma of left upper arm 05/2021    just above left elbow     Basal cell carcinoma of left upper extremity 11/2009    left forearm     Basal cell carcinoma of right upper extremity 03/2020    right upper outer arm     BCC (basal cell carcinoma), face 10/2021    removed via Mohs by Dr. Barnard     Cataract 09/20/2012     Glaucoma (increased eye pressure)      Hyperlipidemia LDL goal < 130      Right-sided low back pain without sciatica, unspecified chronicity age 27       Past Surgical History:   Procedure Laterality Date     APPENDECTOMY       BIOPSY      Left forearm, basal cell     COLONOSCOPY      Age 51     HEAD & NECK SURGERY      5th & 7th cervical disk bulge     SOFT TISSUE SURGERY      Rt shoulder bursitis, ligament stretch     VASECTOMY          Family History   Problem Relation Age of Onset     Diabetes Mother         Started early 60's     Eye Disorder Mother      Glaucoma Mother      Macular Degeneration Mother      Cerebrovascular Disease Mother      Skin Cancer Mother      Hypertension Father         Late onset after prison     Heart Disease Father       Cancer - colorectal Paternal Grandfather      Macular Degeneration Sister        Social History     Socioeconomic History     Marital status:      Spouse name: [place]     Number of children: 2     Years of education: Not on file     Highest education level: Not on file   Occupational History     Employer: [employer]   Tobacco Use     Smoking status: Never     Smokeless tobacco: Never   Substance and Sexual Activity     Alcohol use: Yes     Drug use: No     Sexual activity: Yes     Partners: Female     Birth control/protection: Male Surgical     Comment: Having some ED issues   Other Topics Concern     Parent/sibling w/ CABG, MI or angioplasty before 65F 55M? No   Social History Narrative     Not on file     Social Determinants of Health     Financial Resource Strain: Not on file   Food Insecurity: Not on file   Transportation Needs: Not on file   Physical Activity: Not on file   Stress: Not on file   Social Connections: Not on file   Intimate Partner Violence: Not on file   Housing Stability: Not on file       Outpatient Encounter Medications as of 10/14/2022   Medication Sig Dispense Refill     diclofenac (VOLTAREN) 1 % topical gel Apply topically daily as needed for moderate pain       latanoprost (XALATAN) 0.005 % ophthalmic solution Place 1 drop Into the left eye every evening 2.5 mL 11     sildenafil (REVATIO) 20 MG tablet TAKE 1-4 TABLETS BY MOUTH AS NEEDED PRIOR TO INTERCOURSE. (DO NOT TAKE WITH NITROGLYCERIN, TERAZOSIN, OR DOXAZOSIN) 20 tablet 5     valACYclovir (VALTREX) 1000 mg tablet TAKE TWO TABLETS BY MOUTH NOW AND THEN 12 HOURS LATER AS NEEDED FOR COLD SORES 16 tablet 5     triprolidine-pseudoePHEDrine (APRODINE) 2.5-60 MG TABS per tablet Take 1 tablet by mouth every 6 hours as needed for allergies (Patient not taking: No sig reported) 100 tablet 1     No facility-administered encounter medications on file as of 10/14/2022.             O:   NAD, WDWN, Alert & Oriented, Mood &  "Affect wnl, Vitals stable   Here today alone   There were no vitals taken for this visit.   General appearance normal   Vitals stable   Alert, oriented and in no acute distress     Gritty papule on left helix and crown of scalp   Numerous gritty papules on face   Stuck on papules and brown macules on trunk and ext   Red papules on trunk  Brown papules and macules with regular pigment network and borders on torso and extremities      The remainder of skin exam is normal       Eyes: Conjunctivae/lids:Normal     ENT: Lips: normal    MSK:Normal    Cardiovascular: peripheral edema none    Pulm: Breathing Normal    Lymph Nodes: No Head and Neck Lymphadenopathy     Neuro/Psych: Orientation:Alert and Orientedx3 ; Mood/Affect:normal   A/P:  1. Actinic keratosis on crown of scalp and left helix x 2  LN2:  Treated with LN2 for 5s for 1-2 cycles. Warned risks of blistering, pain, pigment change, scarring, and incomplete resolution.  Advised patient to return if lesions do not completely resolve.  Wound care sheet given.  2. Actinic keratoses on face  PDT: PGACAC discussed. Risks including but not limited to redness, swelling, and blistering to treated area. Patient was instructed to cleanse face thoroughly with a mild cleanser, then face was degreased with acetone. ALA was then applied to face  in a uniform manner. Patient sat for 90 minutes after ALA was applied. The blue light shined on patient's face for 16 minutes 40 seconds he/she was approximately 2-4\" away from the light. Patient then was instructed to wash face and sunscreen with spf 50 was applied. Instructed patient to avoid sun light for 48 hours and apply sunscreen daily. Follow-up in 3-4 months.     3.  Seborrheic keratosis, lentigo, angioma, benign nevi, history of BCC  It was a pleasure speaking to Micky Topete today.  BENIGN LESIONS DISCUSSED WITH PATIENT:  I discussed the specifics of tumor, prognosis, and genetics of benign lesions.  I explained that " treatment of these lesions would be purely cosmetic and not medically neccessary.  I discussed with patient different removal options including excision, cautery and /or laser.      Nature and genetics of benign skin lesions dicussed with patient.  Signs and Symptoms of skin cancer discussed with patient.  ABCDEs of melanoma reviewed with patient.  Patient encouraged to perform monthly skin exams.  Risks of non-melanoma skin cancer discussed with patient   Return to clinic in on in 4 months        Again, thank you for allowing me to participate in the care of your patient.        Sincerely,        Madhavi Yeboah PA-C

## 2022-10-15 NOTE — PROGRESS NOTES
Micky Topete is an extremely pleasant 67 year old year old male patient here today for skin check and PDT. He notes rough areas on face. No painful or bleeding skin lesions. Patient has no other skin complaints today.  Remainder of the HPI, Meds, PMH, Allergies, FH, and SH was reviewed in chart.    Pertinent Hx:        History of BCC  BCC  On right cheek removed 10/21  BCC on left leg removed 10/21  BCC on right leg removed 3/22  Past Medical History:   Diagnosis Date     Actinic keratoses      Basal cell carcinoma of left lower extremity 05/2019    left anterior thigh     Basal cell carcinoma of left upper arm 05/2021    just above left elbow     Basal cell carcinoma of left upper extremity 11/2009    left forearm     Basal cell carcinoma of right upper extremity 03/2020    right upper outer arm     BCC (basal cell carcinoma), face 10/2021    removed via Mohs by Dr. Barnard     Cataract 09/20/2012     Glaucoma (increased eye pressure)      Hyperlipidemia LDL goal < 130      Right-sided low back pain without sciatica, unspecified chronicity age 27       Past Surgical History:   Procedure Laterality Date     APPENDECTOMY       BIOPSY      Left forearm, basal cell     COLONOSCOPY      Age 51     HEAD & NECK SURGERY      5th & 7th cervical disk bulge     SOFT TISSUE SURGERY      Rt shoulder bursitis, ligament stretch     VASECTOMY          Family History   Problem Relation Age of Onset     Diabetes Mother         Started early 60's     Eye Disorder Mother      Glaucoma Mother      Macular Degeneration Mother      Cerebrovascular Disease Mother      Skin Cancer Mother      Hypertension Father         Late onset after nursing home     Heart Disease Father      Cancer - colorectal Paternal Grandfather      Macular Degeneration Sister        Social History     Socioeconomic History     Marital status:      Spouse name: [place]     Number of children: 2     Years of education: Not on file     Highest education level:  Not on file   Occupational History     Employer: [employer]   Tobacco Use     Smoking status: Never     Smokeless tobacco: Never   Substance and Sexual Activity     Alcohol use: Yes     Drug use: No     Sexual activity: Yes     Partners: Female     Birth control/protection: Male Surgical     Comment: Having some ED issues   Other Topics Concern     Parent/sibling w/ CABG, MI or angioplasty before 65F 55M? No   Social History Narrative     Not on file     Social Determinants of Health     Financial Resource Strain: Not on file   Food Insecurity: Not on file   Transportation Needs: Not on file   Physical Activity: Not on file   Stress: Not on file   Social Connections: Not on file   Intimate Partner Violence: Not on file   Housing Stability: Not on file       Outpatient Encounter Medications as of 10/14/2022   Medication Sig Dispense Refill     diclofenac (VOLTAREN) 1 % topical gel Apply topically daily as needed for moderate pain       latanoprost (XALATAN) 0.005 % ophthalmic solution Place 1 drop Into the left eye every evening 2.5 mL 11     sildenafil (REVATIO) 20 MG tablet TAKE 1-4 TABLETS BY MOUTH AS NEEDED PRIOR TO INTERCOURSE. (DO NOT TAKE WITH NITROGLYCERIN, TERAZOSIN, OR DOXAZOSIN) 20 tablet 5     valACYclovir (VALTREX) 1000 mg tablet TAKE TWO TABLETS BY MOUTH NOW AND THEN 12 HOURS LATER AS NEEDED FOR COLD SORES 16 tablet 5     triprolidine-pseudoePHEDrine (APRODINE) 2.5-60 MG TABS per tablet Take 1 tablet by mouth every 6 hours as needed for allergies (Patient not taking: No sig reported) 100 tablet 1     No facility-administered encounter medications on file as of 10/14/2022.             O:   NAD, WDWN, Alert & Oriented, Mood & Affect wnl, Vitals stable   Here today alone   There were no vitals taken for this visit.   General appearance normal   Vitals stable   Alert, oriented and in no acute distress     Gritty papule on left helix and crown of scalp   Numerous gritty papules on face  "  Stuck on papules and brown macules on trunk and ext   Red papules on trunk  Brown papules and macules with regular pigment network and borders on torso and extremities      The remainder of skin exam is normal       Eyes: Conjunctivae/lids:Normal     ENT: Lips: normal    MSK:Normal    Cardiovascular: peripheral edema none    Pulm: Breathing Normal    Lymph Nodes: No Head and Neck Lymphadenopathy     Neuro/Psych: Orientation:Alert and Orientedx3 ; Mood/Affect:normal   A/P:  1. Actinic keratosis on crown of scalp and left helix x 2  LN2:  Treated with LN2 for 5s for 1-2 cycles. Warned risks of blistering, pain, pigment change, scarring, and incomplete resolution.  Advised patient to return if lesions do not completely resolve.  Wound care sheet given.  2. Actinic keratoses on face  PDT: PGACAC discussed. Risks including but not limited to redness, swelling, and blistering to treated area. Patient was instructed to cleanse face thoroughly with a mild cleanser, then face was degreased with acetone. ALA was then applied to face  in a uniform manner. Patient sat for 90 minutes after ALA was applied. The blue light shined on patient's face for 16 minutes 40 seconds he/she was approximately 2-4\" away from the light. Patient then was instructed to wash face and sunscreen with spf 50 was applied. Instructed patient to avoid sun light for 48 hours and apply sunscreen daily. Follow-up in 3-4 months.     3.  Seborrheic keratosis, lentigo, angioma, benign nevi, history of BCC  It was a pleasure speaking to Micky Topete today.  BENIGN LESIONS DISCUSSED WITH PATIENT:  I discussed the specifics of tumor, prognosis, and genetics of benign lesions.  I explained that treatment of these lesions would be purely cosmetic and not medically neccessary.  I discussed with patient different removal options including excision, cautery and /or laser.      Nature and genetics of benign skin lesions dicussed with patient.  Signs and Symptoms " of skin cancer discussed with patient.  ABCDEs of melanoma reviewed with patient.  Patient encouraged to perform monthly skin exams.  Risks of non-melanoma skin cancer discussed with patient   Return to clinic in on in 4 months

## 2023-01-04 NOTE — PATIENT INSTRUCTIONS
Continue Latanoprost every evening left eye.  Start Timolol daily left eye 1 month before next visit.  Return visit in 6 months for a complete exam.   Jerardo Orta M.D.  [phone]

## 2023-01-05 ENCOUNTER — OFFICE VISIT (OUTPATIENT)
Dept: OPHTHALMOLOGY | Facility: CLINIC | Age: 68
End: 2023-01-05
Payer: COMMERCIAL

## 2023-01-05 DIAGNOSIS — H40.002 GLAUCOMA SUSPECT OF LEFT EYE: Primary | ICD-10-CM

## 2023-01-05 PROCEDURE — 92012 INTRM OPH EXAM EST PATIENT: CPT | Performed by: OPHTHALMOLOGY

## 2023-01-05 RX ORDER — TIMOLOL MALEATE 5 MG/ML
1 SOLUTION/ DROPS OPHTHALMIC DAILY
Qty: 5 ML | Refills: 11 | Status: SHIPPED | OUTPATIENT
Start: 2023-01-05 | End: 2024-05-01

## 2023-01-05 ASSESSMENT — TONOMETRY
OS_IOP_MMHG: 20
OD_IOP_MMHG: 20
IOP_METHOD: APPLANATION

## 2023-01-05 ASSESSMENT — PACHYMETRY
OD_CT(UM): .557
OS_CT(UM): .550

## 2023-01-05 ASSESSMENT — VISUAL ACUITY
OD_CC: 20/25
OS_CC: 20/20
CORRECTION_TYPE: GLASSES
METHOD: SNELLEN - LINEAR
OD_CC+: +2

## 2023-01-05 ASSESSMENT — SLIT LAMP EXAM - LIDS: COMMENTS: NORMAL

## 2023-01-05 ASSESSMENT — EXTERNAL EXAM - LEFT EYE: OS_EXAM: NORMAL

## 2023-01-05 ASSESSMENT — EXTERNAL EXAM - RIGHT EYE: OD_EXAM: NORMAL

## 2023-01-05 NOTE — LETTER
1/5/2023         RE: Micky Topete  [address] [facility] [address]        Dear Colleague,    Thank you for referring your patient, Micky Topete, to the [facility]. Please see a copy of my visit note below.     Current Eye Medications:  Latanoprost - left eye only at bedtime - last dose: 10:30pm last night  Artificial tears - both eyes (right>left eye) PRN     Subjective: here for a 5 month follow-up with an IOP check- tries to be compliant with nightly use of latanoprost - rarely misses. No changes with vision in either eye. Has occasional tearing/irritation RIGHT eye - unsure if due to only using drop of Latanoprost with left eye - use of artificial tears help - avg 1x/week.    Gets significant tearing both eyes when driving, occurring intermittently for the last 5 years, most recently one month ago. Tears to the point where patient will need to wipe his eyes with a tissue or will need to pull over to the side of the road.      Objective:  See Ophthalmology Exam.       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      Plan:  Continue Latanoprost every evening left eye.  Start Timolol daily left eye 1 month before next visit.  Return visit in 6 months for a complete exam.   Jerardo Orta M.D.  [phone]            Again, thank you for allowing me to participate in the care of your patient.        Sincerely,        Jerardo Orta MD

## 2023-01-05 NOTE — PROGRESS NOTES
Current Eye Medications:  Latanoprost - left eye only at bedtime - last dose: 10:30pm last night  Artificial tears - both eyes (right>left eye) PRN     Subjective: here for a 5 month follow-up with an IOP check- tries to be compliant with nightly use of latanoprost - rarely misses. No changes with vision in either eye. Has occasional tearing/irritation RIGHT eye - unsure if due to only using drop of Latanoprost with left eye - use of artificial tears help - avg 1x/week.    Gets significant tearing both eyes when driving, occurring intermittently for the last 5 years, most recently one month ago. Tears to the point where patient will need to wipe his eyes with a tissue or will need to pull over to the side of the road.      Objective:  See Ophthalmology Exam.       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      Plan:  Continue Latanoprost every evening left eye.  Start Timolol daily left eye 1 month before next visit.  Return visit in 6 months for a complete exam.   Jerardo Orta M.D.  [phone]

## 2023-03-02 ENCOUNTER — OFFICE VISIT (OUTPATIENT)
Dept: DERMATOLOGY | Facility: CLINIC | Age: 68
End: 2023-03-02
Payer: COMMERCIAL

## 2023-03-02 DIAGNOSIS — Z12.83 ENCOUNTER FOR SCREENING FOR MALIGNANT NEOPLASM OF SKIN: ICD-10-CM

## 2023-03-02 DIAGNOSIS — L82.1 SEBORRHEIC KERATOSES: ICD-10-CM

## 2023-03-02 DIAGNOSIS — D18.01 CHERRY ANGIOMA: ICD-10-CM

## 2023-03-02 DIAGNOSIS — L57.0 AK (ACTINIC KERATOSIS): ICD-10-CM

## 2023-03-02 DIAGNOSIS — D22.9 MULTIPLE BENIGN NEVI: Primary | ICD-10-CM

## 2023-03-02 DIAGNOSIS — L81.4 LENTIGINES: ICD-10-CM

## 2023-03-02 DIAGNOSIS — Z85.828 HISTORY OF BASAL CELL CARCINOMA (BCC): ICD-10-CM

## 2023-03-02 PROCEDURE — 17000 DESTRUCT PREMALG LESION: CPT | Performed by: NURSE PRACTITIONER

## 2023-03-02 PROCEDURE — 17003 DESTRUCT PREMALG LES 2-14: CPT | Performed by: NURSE PRACTITIONER

## 2023-03-02 PROCEDURE — 99213 OFFICE O/P EST LOW 20 MIN: CPT | Mod: 25 | Performed by: NURSE PRACTITIONER

## 2023-03-02 NOTE — PROGRESS NOTES
[facility] Dermatology Note  Encounter Date: Mar 2, 2023  Office Visit     Reviewed patients past medical history and pertinent chart review prior to patients visit today.     Dermatology Problem List:  History of BCC  BCC  On right cheek removed 10/21  BCC on left leg removed 10/21  BCC on right leg removed 3/22    History of actinic keratosis treated with PDT    ____________________________________________    Assessment & Plan:     # History of BCC, well-healed scars without signs of recurrence    # Actinic keratosis. Premalignant nature discussed with patient. Treatment options discussed with patient today including no treatment, topical treatment, and cryotherapy. Patient elects to treat visible lesions today with cryotherapy. After verbal consent and discussion of risks and benefits including but no limited to dyspigmentation/scar, blister, and pain. A total of 7 actinic keratoses were treated with 1-2mm freeze border for 2 cycle with liquid nitrogen. Post cryotherapy instructions were provided.    # Benign skin findings including: seborrheic keratoses, cherry angioma, lentigines and benign nevi.   - No further intervention required. Patient to report changes.   - Patient reassured of the benign nature of these lesions.    #Signs and Symptoms of non-melanoma skin cancer and ABCDEs of melanoma reviewed with patient. Patient encouraged to perform monthly self skin exams and educated on how to perform them. UV precautions reviewed with patient. Patient was asked about new or changing moles/lesions on body.     #Reviewed Sunscreen: Apply 20 minutes prior to going outdoors and reapply every two hours, when wet or sweating. We recommend using an SPF 30 or higher, and to use one that is water resistant.       Follow-up:  1 years for follow up full body skin exam, prn for new or changing lesions or new concerns    Claudia Dickson, J CARLOS CNP on 3/2/2023 at 9:24  AM    ____________________________________________    CC: Skin Check (Right lower leg-lesion that has been changing/PDT F/U- only residual is area to right temple and outer cheek )    HPI:  Mr. Micky Topete is a(n) 67 year old male who presents today as a return patient for a full body skin cancer screening. Patient has concerns today about some rough pink spots on his face.  He had PDT for treatment of actinic keratosis and feels like this cleared most of them but he still has some around the periphery of the face he says..     Patient is otherwise feeling well, without additional skin concerns.     Physical Exam:  Vitals: There were no vitals taken for this visit.  SKIN: Total skin excluding the genitalia areas was performed. The exam included the head/face, neck, both arms, chest, back, abdomen, both legs, digits, mons pubis, buttock and nails.   -Well-healed scars on the right cheek, left leg, and right lower leg without signs of recurrent malignancies  -There is/are 7 erythematous macules with overlying adherent scale on the left forehead, right forehead, right temple x2, right jawline, right dorsal hand, right upper arm  -Left lower leg x2, grey/brown firm papules with stellate center on dermoscopy and positive dimples sign .    -several 1-2mm red dome shaped symmetric papules scattered on the trunk  -multiple tan/brown flat round macules and raised papules scattered throughout trunk, extremities and head. No worrisome features for malignancy noted on examination.  -scattered tan, homogenous macules scattered on sun exposed areas of trunk, extremities and face.   -scattered waxy, stuck on tan/brown papules and patches on the trunk     - No other lesions of concern on areas examined.     Medications:  Current Outpatient Medications   Medication     diclofenac (VOLTAREN) 1 % topical gel     latanoprost (XALATAN) 0.005 % ophthalmic solution     sildenafil (REVATIO) 20 MG tablet     timolol maleate (TIMOPTIC)  0.5 % ophthalmic solution     triprolidine-pseudoePHEDrine (APRODINE) 2.5-60 MG TABS per tablet     valACYclovir (VALTREX) 1000 mg tablet     No current facility-administered medications for this visit.      Past Medical History:   Patient Active Problem List   Diagnosis     Hyperlipidemia with target LDL less than 130     Advanced directives, counseling/discussion     History of basal cell cancer     Cataract of both eyes     Actinic keratoses     Erectile dysfunction, unspecified erectile dysfunction type     Right-sided low back pain without sciatica, unspecified chronicity     Posterior vitreous detachment of both eyes     Glaucoma suspect of left eye - treated     Pseudoexfoliation (PXF) of left lens capsule     Umbilical hernia without obstruction and without gangrene     Past Medical History:   Diagnosis Date     Actinic keratoses      Basal cell carcinoma of left lower extremity 05/2019    left anterior thigh     Basal cell carcinoma of left upper arm 05/2021    just above left elbow     Basal cell carcinoma of left upper extremity 11/2009    left forearm     Basal cell carcinoma of right upper extremity 03/2020    right upper outer arm     BCC (basal cell carcinoma), face 10/2021    removed via Mohs by Dr. Barnard     Cataract 09/20/2012     Glaucoma (increased eye pressure)      Hyperlipidemia LDL goal < 130      Right-sided low back pain without sciatica, unspecified chronicity age 27       CC No referring provider defined for this encounter. on close of this encounter.

## 2023-03-02 NOTE — LETTER
3/2/2023         RE: Micky Topete  [address] [facility] [address]        Dear Colleague,    Thank you for referring your patient, Micky Topete, to the [facility]. Please see a copy of my visit note below.    [facility] Dermatology Note  Encounter Date: Mar 2, 2023  Office Visit     Reviewed patients past medical history and pertinent chart review prior to patients visit today.     Dermatology Problem List:  History of BCC  BCC  On right cheek removed 10/21  BCC on left leg removed 10/21  BCC on right leg removed 3/22    History of actinic keratosis treated with PDT    ____________________________________________    Assessment & Plan:     # History of BCC, well-healed scars without signs of recurrence    # Actinic keratosis. Premalignant nature discussed with patient. Treatment options discussed with patient today including no treatment, topical treatment, and cryotherapy. Patient elects to treat visible lesions today with cryotherapy. After verbal consent and discussion of risks and benefits including but no limited to dyspigmentation/scar, blister, and pain. A total of 7 actinic keratoses were treated with 1-2mm freeze border for 2 cycle with liquid nitrogen. Post cryotherapy instructions were provided.    # Benign skin findings including: seborrheic keratoses, cherry angioma, lentigines and benign nevi.   - No further intervention required. Patient to report changes.   - Patient reassured of the benign nature of these lesions.    #Signs and Symptoms of non-melanoma skin cancer and ABCDEs of melanoma reviewed with patient. Patient encouraged to perform monthly self skin exams and educated on how to perform them. UV precautions reviewed with patient. Patient was asked about new or changing moles/lesions on body.     #Reviewed Sunscreen: Apply 20 minutes prior to going outdoors and reapply every two hours, when wet or sweating. We recommend using  an SPF 30 or higher, and to use one that is water resistant.       Follow-up:  1 years for follow up full body skin exam, prn for new or changing lesions or new concerns    Claudia Dickson, APRN CNP on 3/2/2023 at 9:24 AM    ____________________________________________    CC: Skin Check (Right lower leg-lesion that has been changing/PDT F/U- only residual is area to right temple and outer cheek )    HPI:  Mr. Micky Topete is a(n) 67 year old male who presents today as a return patient for a full body skin cancer screening. Patient has concerns today about some rough pink spots on his face.  He had PDT for treatment of actinic keratosis and feels like this cleared most of them but he still has some around the periphery of the face he says..     Patient is otherwise feeling well, without additional skin concerns.     Physical Exam:  Vitals: There were no vitals taken for this visit.  SKIN: Total skin excluding the genitalia areas was performed. The exam included the head/face, neck, both arms, chest, back, abdomen, both legs, digits, mons pubis, buttock and nails.   -Well-healed scars on the right cheek, left leg, and right lower leg without signs of recurrent malignancies  -There is/are 7 erythematous macules with overlying adherent scale on the left forehead, right forehead, right temple x2, right jawline, right dorsal hand, right upper arm  -Left lower leg x2, grey/brown firm papules with stellate center on dermoscopy and positive dimples sign .    -several 1-2mm red dome shaped symmetric papules scattered on the trunk  -multiple tan/brown flat round macules and raised papules scattered throughout trunk, extremities and head. No worrisome features for malignancy noted on examination.  -scattered tan, homogenous macules scattered on sun exposed areas of trunk, extremities and face.   -scattered waxy, stuck on tan/brown papules and patches on the trunk     - No other lesions of concern on areas examined.      Medications:  Current Outpatient Medications   Medication     diclofenac (VOLTAREN) 1 % topical gel     latanoprost (XALATAN) 0.005 % ophthalmic solution     sildenafil (REVATIO) 20 MG tablet     timolol maleate (TIMOPTIC) 0.5 % ophthalmic solution     triprolidine-pseudoePHEDrine (APRODINE) 2.5-60 MG TABS per tablet     valACYclovir (VALTREX) 1000 mg tablet     No current facility-administered medications for this visit.      Past Medical History:   Patient Active Problem List   Diagnosis     Hyperlipidemia with target LDL less than 130     Advanced directives, counseling/discussion     History of basal cell cancer     Cataract of both eyes     Actinic keratoses     Erectile dysfunction, unspecified erectile dysfunction type     Right-sided low back pain without sciatica, unspecified chronicity     Posterior vitreous detachment of both eyes     Glaucoma suspect of left eye - treated     Pseudoexfoliation (PXF) of left lens capsule     Umbilical hernia without obstruction and without gangrene     Past Medical History:   Diagnosis Date     Actinic keratoses      Basal cell carcinoma of left lower extremity 05/2019    left anterior thigh     Basal cell carcinoma of left upper arm 05/2021    just above left elbow     Basal cell carcinoma of left upper extremity 11/2009    left forearm     Basal cell carcinoma of right upper extremity 03/2020    right upper outer arm     BCC (basal cell carcinoma), face 10/2021    removed via Mohs by Dr. Barnard     Cataract 09/20/2012     Glaucoma (increased eye pressure)      Hyperlipidemia LDL goal < 130      Right-sided low back pain without sciatica, unspecified chronicity age 27       CC No referring provider defined for this encounter. on close of this encounter.      Again, thank you for allowing me to participate in the care of your patient.        Sincerely,        Claudia Dickson, J CARLOS CNP

## 2023-03-15 ENCOUNTER — E-VISIT (OUTPATIENT)
Dept: URGENT CARE | Facility: CLINIC | Age: 68
End: 2023-03-15
Payer: COMMERCIAL

## 2023-03-15 DIAGNOSIS — J01.90 ACUTE SINUSITIS WITH SYMPTOMS > 10 DAYS: Primary | ICD-10-CM

## 2023-03-15 PROCEDURE — 99421 OL DIG E/M SVC 5-10 MIN: CPT | Performed by: PHYSICIAN ASSISTANT

## 2023-03-15 RX ORDER — DOXYCYCLINE 100 MG/1
100 CAPSULE ORAL 2 TIMES DAILY
Qty: 14 CAPSULE | Refills: 0 | Status: SHIPPED | OUTPATIENT
Start: 2023-03-15 | End: 2023-03-22

## 2023-03-15 NOTE — PATIENT INSTRUCTIONS
Dear Micky Topete    1.  Take antibiotic according to instructions, with food to prevent stomach upset. If you are prone to stomach upset with antibiotics, I recommend adding a probiotic to this regimen.  Culturelle is a trusted brand.  2.  I recommend using Mucinex to help thin mucus secretions.  Adding a nasal steroid spray such as Flonase can also be helpful with clearing sinus congestion.  3.  Take Tylenol 650mg every 4 hours or ibuprofen 600mg every 6 hours by mouth for pain/fever.  Do not exceed 4000mg of acetaminophen or 2400mg of ibuprofen from any source in a 24 hour period.  Taking Tylenol and ibuprofen together may be helpful in reducing pain.   4.  Follow-up if you not having any improvement in your symptoms over the next 5 days.    Sinusitis  The sinuses are air-filled spaces within the bones of the face. They connect to the inside of the nose. Sinusitis is an inflammation of the tissue that lines the sinuses. Sinusitis can occur during a cold. It can also happen due to allergies to pollens and other particles in the air. Sinusitis can cause symptoms of sinus congestion and a feeling of fullness. A sinus infection causes fever, headache, and facial pain. There is often green or yellow fluid draining from the nose or into the back of the throat (post-nasal drip). You have been given antibiotics to treat this condition.  Home care    Take the full course of antibiotics as instructed. Do not stop taking them, even when you feel better.    Drink plenty of water, hot tea, and other liquids. This may help thin nasal mucus. It also may help your sinuses drain fluids.    Heat may help soothe painful areas of your face. Use a towel soaked in hot water. Or,  the shower and direct the warm spray onto your face. Using a vaporizer along with a menthol rub at night may also help soothe symptoms.     An expectorant with guaifenesin may help thin nasal mucus and help your sinuses drain fluids.    You can  use an over-the-counter decongestant, unless a similar medicine was prescribed to you. Nasal sprays work the fastest. Use one that contains phenylephrine or oxymetazoline. First blow your nose gently. Then use the spray. Do not use these medicines more often than directed on the label. If you do, your symptoms may get worse. You may also take pills that contain pseudoephedrine. Don t use products that combine multiple medicines. This is because side effects may be increased. Read labels. You can also ask the pharmacist for help. (People with high blood pressure should not use decongestants. They can raise blood pressure.)    Over-the-counter antihistamines may help if allergies contributed to your sinusitis.      Do not use nasal rinses or irrigation during an acute sinus infection, unless your healthcare provider tells you to. Rinsing may spread the infection to other areas in your sinuses.    Use acetaminophen or ibuprofen to control pain, unless another pain medicine was prescribed to you. If you have chronic liver or kidney disease or ever had a stomach ulcer, talk with your healthcare provider before using these medicines. (Aspirin should never be taken by anyone under age 18 who is ill with a fever. It may cause severe liver damage.)    Don't smoke. This can make symptoms worse.  Follow-up care  Follow up with your healthcare provider or our staff if you are better in 1 week.  When to seek medical advice  Call your healthcare provider if any of these occur:    Facial pain or headache that gets worse    Stiff neck    Unusual drowsiness or confusion    Swelling of your forehead or eyelids    Vision problems, such as blurred or double vision    Fever of 100.4 F (38 C) or higher, or as directed by your healthcare provider    Seizure    Breathing problems    Symptoms don't go away in 10 days  Prevention  Here are steps you can take to help prevent an infection:    Keep good hand washing habits.    Don t have close  contact with people who have sore throats, colds, or other upper respiratory infections.    Don t smoke, and stay away from secondhand smoke.    Stay up to date with of your vaccines.  Date Last Reviewed: 11/1/2017 2000-2017 [employer]. [address] [place] [id number]. All rights reserved. This information is not intended as a substitute for professional medical care. Always follow your healthcare professional's instructions.             Thanks for choosing us as your health care partner,    Yoon Holland PA-C

## 2023-05-04 ENCOUNTER — E-VISIT (OUTPATIENT)
Dept: FAMILY MEDICINE | Facility: CLINIC | Age: 68
End: 2023-05-04
Payer: COMMERCIAL

## 2023-05-04 DIAGNOSIS — R05.2 SUBACUTE COUGH: Primary | ICD-10-CM

## 2023-05-04 PROCEDURE — 99421 OL DIG E/M SVC 5-10 MIN: CPT | Performed by: FAMILY MEDICINE

## 2023-05-04 RX ORDER — PREDNISONE 20 MG/1
TABLET ORAL
Qty: 15 TABLET | Refills: 0 | Status: SHIPPED | OUTPATIENT
Start: 2023-05-04 | End: 2023-08-22

## 2023-05-08 ENCOUNTER — OFFICE VISIT (OUTPATIENT)
Dept: FAMILY MEDICINE | Facility: CLINIC | Age: 68
End: 2023-05-08
Payer: COMMERCIAL

## 2023-05-08 VITALS
HEART RATE: 77 BPM | BODY MASS INDEX: 24.93 KG/M2 | WEIGHT: 184.08 LBS | OXYGEN SATURATION: 98 % | SYSTOLIC BLOOD PRESSURE: 138 MMHG | DIASTOLIC BLOOD PRESSURE: 78 MMHG | HEIGHT: 72 IN | TEMPERATURE: 97.8 F | RESPIRATION RATE: 14 BRPM

## 2023-05-08 DIAGNOSIS — J01.10 ACUTE NON-RECURRENT FRONTAL SINUSITIS: ICD-10-CM

## 2023-05-08 DIAGNOSIS — R05.1 ACUTE COUGH: Primary | ICD-10-CM

## 2023-05-08 LAB
DEPRECATED S PYO AG THROAT QL EIA: NEGATIVE
FLUAV AG SPEC QL IA: NEGATIVE
FLUBV AG SPEC QL IA: NEGATIVE

## 2023-05-08 PROCEDURE — U0003 INFECTIOUS AGENT DETECTION BY NUCLEIC ACID (DNA OR RNA); SEVERE ACUTE RESPIRATORY SYNDROME CORONAVIRUS 2 (SARS-COV-2) (CORONAVIRUS DISEASE [COVID-19]), AMPLIFIED PROBE TECHNIQUE, MAKING USE OF HIGH THROUGHPUT TECHNOLOGIES AS DESCRIBED BY CMS-2020-01-R: HCPCS | Performed by: PHYSICIAN ASSISTANT

## 2023-05-08 PROCEDURE — 87804 INFLUENZA ASSAY W/OPTIC: CPT | Performed by: PHYSICIAN ASSISTANT

## 2023-05-08 PROCEDURE — 87651 STREP A DNA AMP PROBE: CPT | Performed by: PHYSICIAN ASSISTANT

## 2023-05-08 PROCEDURE — U0005 INFEC AGEN DETEC AMPLI PROBE: HCPCS | Performed by: PHYSICIAN ASSISTANT

## 2023-05-08 PROCEDURE — 99213 OFFICE O/P EST LOW 20 MIN: CPT | Mod: CS | Performed by: PHYSICIAN ASSISTANT

## 2023-05-08 RX ORDER — ALBUTEROL SULFATE 90 UG/1
1-2 AEROSOL, METERED RESPIRATORY (INHALATION) EVERY 4 HOURS PRN
Qty: 6.7 G | Refills: 0 | Status: SHIPPED | OUTPATIENT
Start: 2023-05-08 | End: 2023-08-22

## 2023-05-08 RX ORDER — BENZONATATE 100 MG/1
100 CAPSULE ORAL 3 TIMES DAILY PRN
Qty: 40 CAPSULE | Refills: 0 | Status: SHIPPED | OUTPATIENT
Start: 2023-05-08 | End: 2023-08-22

## 2023-05-08 RX ORDER — DOXYCYCLINE 100 MG/1
100 CAPSULE ORAL 2 TIMES DAILY
Qty: 14 CAPSULE | Refills: 0 | Status: SHIPPED | OUTPATIENT
Start: 2023-05-08 | End: 2023-05-15

## 2023-05-08 ASSESSMENT — PAIN SCALES - GENERAL: PAINLEVEL: MILD PAIN (2)

## 2023-05-08 NOTE — PATIENT INSTRUCTIONS
Manuel Nix,    Thank you for allowing [facility] to manage your care.    I am unsure of the cause of your symptoms, but your exam is reassuring. We will see what our workup shows.     If you develop worsening/changing symptoms at any time, please call 911 or go to the emergency department for evaluation.    I sent your prescriptions to your pharmacy. Take a probiotic pill, eat live culture yogurt (Greek yogurt or Activia), drink kefir daily for one week after finishing the antibiotics to encourage growth of good bacteria in your system.    For cough not controlled by other medications, please use benzonatate as prescribed. Do not use this medication while driving, operating machinery, with other sedating medications, or while drinking alcohol as it will make you drowsy.    Please allow 1-2 business days for our office to contact you in regards to your laboratory/radiological studies.  If not done so, I encourage you to login into [employer] ([email or web address] to review your results as well.     If you have any questions or concerns, please feel free to call us at [phone]    Sincerely,    Adonis Herman PA-C    Did you know?      You can schedule a video visit for follow-up appointments as well as future appointments for certain conditions.  Please see the below link.     [email or web address]    If you have not already done so,  I encourage you to sign up for [employer] ([email or web address]).  This will allow you to review your results, securely communicate with a provider, and schedule virtual visits as well.

## 2023-05-08 NOTE — PROGRESS NOTES
Assessment & Plan   Problem List Items Addressed This Visit    None  Visit Diagnoses     Acute cough    -  Primary    Relevant Medications    benzonatate (TESSALON) 100 MG capsule    albuterol (PROAIR HFA/PROVENTIL HFA/VENTOLIN HFA) 108 (90 Base) MCG/ACT inhaler    Acute non-recurrent frontal sinusitis        Relevant Medications    benzonatate (TESSALON) 100 MG capsule    albuterol (PROAIR HFA/PROVENTIL HFA/VENTOLIN HFA) 108 (90 Base) MCG/ACT inhaler         Impression is likely viral URI including COVID-19. Neg strep, flu, and COVID-19 PCR. Appears well and non-toxic and I have low suspicion for impending airway obstruction or respiratory distress at this point.  He will push p.o. fluids, use over-the-counter meds for symptoms, complete a course of Augmentin, benzonatate, albuterol inhaler and follow-up with us in 2 weeks if not improving or urgent care/the ER if symptoms worsen/change at any time.    Complete history and physical exam as below. Afebrile with normal vital signs.    DDx and Dx discussed with and explained to the pt to their satisfaction.  All questions were answered at this time. Pt expressed understanding of and agreement with this dx, tx, and plan. No further workup warranted and standard medication warnings given. I have given the patient a list of pertinent indications for re-evaluation. Will go to the Emergency Department if symptoms worsen or new concerning symptoms arise. Patient left in no apparent distress.     Ordering of each unique test  Prescription drug management    See Patient Instructions    SARANYA Breaux  [facility] JUNE Nix is a 67 year old, presenting for the following health issues:  Ent Problem        5/8/2023     5:34 PM   Additional Questions   Roomed by maria m douglas   Accompanied by no one         5/8/2023     5:34 PM   Patient Reported Additional Medications   Patient reports taking the following new medications none     History of  Present Illness       Reason for visit:  Cough w/ sinus congestionHe consumes 1 sweetened beverage(s) daily.He exercises with enough effort to increase his heart rate 10 to 19 minutes per day.    He is taking medications regularly.     1 week of cough and congestion. Frontal facial pressure. No f/c, sob, chest pain, dizziness, or other symptoms.    Review of Systems   Constitutional, HEENT, cardiovascular, pulmonary, gi and gu systems are negative, except as otherwise noted.      Objective    There were no vitals taken for this visit.  There is no height or weight on file to calculate BMI.  Physical Exam  Vitals and nursing note reviewed.   Constitutional:       General: He is not in acute distress.     Appearance: He is not ill-appearing or diaphoretic.   HENT:      Head: Normocephalic and atraumatic.      Comments: Scalp and faace non-tender aside from mild tenderness to the frontal region.     Right Ear: Tympanic membrane, ear canal and external ear normal.      Left Ear: Tympanic membrane, ear canal and external ear normal.      Nose: Nose normal.      Mouth/Throat:      Mouth: Mucous membranes are moist.      Comments: No trismus, voice abnormalities or asymmetry to the oropharynx.  Eyes:      Conjunctiva/sclera: Conjunctivae normal.   Cardiovascular:      Rate and Rhythm: Normal rate and regular rhythm.      Heart sounds: Normal heart sounds. No murmur heard.     No friction rub. No gallop.   Pulmonary:      Effort: Pulmonary effort is normal. No respiratory distress.      Breath sounds: Normal breath sounds. No stridor. No wheezing, rhonchi or rales.   Musculoskeletal:      Cervical back: Normal range of motion and neck supple. No rigidity.   Lymphadenopathy:      Cervical: No cervical adenopathy.   Skin:     General: Skin is warm and dry.   Neurological:      General: No focal deficit present.      Mental Status: He is alert. Mental status is at baseline.   Psychiatric:         Mood and Affect: Mood normal.          Behavior: Behavior normal.          Results for orders placed or performed in visit on 05/08/23   Symptomatic COVID-19 Virus (Coronavirus) by PCR Nose     Status: Normal    Specimen: Nose; Swab   Result Value Ref Range    SARS CoV2 PCR Negative Negative    Narrative    Testing was performed using the Aptima SARS-CoV-2 Assay on the  [employer] Instrument System. Additional information about this  Emergency Use Authorization (EUA) assay can be found via the Lab  Guide. This test should be ordered for the detection of SARS-CoV-2 in  individuals who meet SARS-CoV-2 clinical and/or epidemiological  criteria. Test performance is unknown in asymptomatic patients. This  test is for in vitro diagnostic use under the FDA EUA for  laboratories certified under CLIA to perform high complexity testing.  This test has not been FDA cleared or approved. A negative result  does not rule out the presence of PCR inhibitors in the specimen or  target RNA in concentration below the limit of detection for the  assay. The possibility of a false negative should be considered if  the patient's recent exposure or clinical presentation suggests  COVID-19. This test was validated by the [facility]. This laboratory is certified under  the Clinical Laboratory Improvement Amendments of 1988 (CLIA-88) as  qualified to perform high complexity laboratory testing.   Streptococcus A Rapid Screen w/Reflex to PCR - Clinic Collect     Status: Normal    Specimen: Throat; Swab   Result Value Ref Range    Group A Strep antigen Negative Negative   Influenza A/B antigen     Status: Normal    Specimen: Nose; Swab   Result Value Ref Range    Influenza A antigen Negative Negative    Influenza B antigen Negative Negative    Narrative    Test results must be correlated with clinical data. If necessary, results should be confirmed by a molecular assay or viral culture.   Group A Streptococcus PCR Throat Swab      Status: Normal    Specimen: Throat; Swab   Result Value Ref Range    Group A strep by PCR Not Detected Not Detected    Narrative    The Xpert Xpress Strep A test, performed on the [employer]  Instrument Systems, is a rapid, qualitative in vitro diagnostic test for the detection of Streptococcus pyogenes (Group A ß-hemolytic Streptococcus, Strep A) in throat swab specimens from patients with signs and symptoms of pharyngitis. The Xpert Xpress Strep A test can be used as an aid in the diagnosis of Group A Streptococcal pharyngitis. The assay is not intended to monitor treatment for Group A Streptococcus infections. The Xpert Xpress Strep A test utilizes an automated real-time polymerase chain reaction (PCR) to detect Streptococcus pyogenes DNA.

## 2023-05-09 LAB — GROUP A STREP BY PCR: NOT DETECTED

## 2023-05-10 LAB — SARS-COV-2 RNA RESP QL NAA+PROBE: NEGATIVE

## 2023-05-12 DIAGNOSIS — N52.9 ERECTILE DYSFUNCTION, UNSPECIFIED ERECTILE DYSFUNCTION TYPE: ICD-10-CM

## 2023-05-12 RX ORDER — SILDENAFIL CITRATE 20 MG/1
TABLET ORAL
Qty: 20 TABLET | Refills: 5 | Status: SHIPPED | OUTPATIENT
Start: 2023-05-12 | End: 2024-08-26

## 2023-06-21 ENCOUNTER — PATIENT OUTREACH (OUTPATIENT)
Dept: CARE COORDINATION | Facility: CLINIC | Age: 68
End: 2023-06-21
Payer: COMMERCIAL

## 2023-07-05 ENCOUNTER — PATIENT OUTREACH (OUTPATIENT)
Dept: CARE COORDINATION | Facility: CLINIC | Age: 68
End: 2023-07-05
Payer: COMMERCIAL

## 2023-07-10 ENCOUNTER — OFFICE VISIT (OUTPATIENT)
Dept: OPHTHALMOLOGY | Facility: CLINIC | Age: 68
End: 2023-07-10
Payer: COMMERCIAL

## 2023-07-10 DIAGNOSIS — H52.4 PRESBYOPIA: ICD-10-CM

## 2023-07-10 DIAGNOSIS — H43.813 POSTERIOR VITREOUS DETACHMENT OF BOTH EYES: ICD-10-CM

## 2023-07-10 DIAGNOSIS — H40.002 GLAUCOMA SUSPECT OF LEFT EYE: ICD-10-CM

## 2023-07-10 DIAGNOSIS — H26.8 PSEUDOEXFOLIATION (PXF) OF LEFT LENS CAPSULE: ICD-10-CM

## 2023-07-10 DIAGNOSIS — Z01.01 ENCOUNTER FOR EXAMINATION OF EYES AND VISION WITH ABNORMAL FINDINGS: Primary | ICD-10-CM

## 2023-07-10 PROCEDURE — 92014 COMPRE OPH EXAM EST PT 1/>: CPT | Performed by: OPHTHALMOLOGY

## 2023-07-10 PROCEDURE — 92015 DETERMINE REFRACTIVE STATE: CPT | Performed by: OPHTHALMOLOGY

## 2023-07-10 ASSESSMENT — REFRACTION_WEARINGRX
OD_SPHERE: -3.00
SPECS_TYPE: PAL
OD_SPHERE: -2.75
OD_CYLINDER: SPHERE
SPECS_TYPE: PAL
OD_CYLINDER: +0.25
OS_CYLINDER: SPHERE
OD_AXIS: 013
OS_SPHERE: -2.50
OS_ADD: +2.42
OS_ADD: +2.50
OD_ADD: +2.42
OS_CYLINDER: SPHERE
OS_SPHERE: -3.00
OD_ADD: +2.50

## 2023-07-10 ASSESSMENT — CONF VISUAL FIELD
OD_NORMAL: 1
OS_INFERIOR_TEMPORAL_RESTRICTION: 0
OS_NORMAL: 1
OD_INFERIOR_NASAL_RESTRICTION: 0
METHOD: COUNTING FINGERS
OS_SUPERIOR_TEMPORAL_RESTRICTION: 0
OD_SUPERIOR_NASAL_RESTRICTION: 0
OS_INFERIOR_NASAL_RESTRICTION: 0
OS_SUPERIOR_NASAL_RESTRICTION: 0
OD_SUPERIOR_TEMPORAL_RESTRICTION: 0
OD_INFERIOR_TEMPORAL_RESTRICTION: 0

## 2023-07-10 ASSESSMENT — TONOMETRY
OD_IOP_MMHG: 22
IOP_METHOD: APPLANATION
OS_IOP_MMHG: 23

## 2023-07-10 ASSESSMENT — VISUAL ACUITY
OS_CC: 20/25
METHOD: SNELLEN - LINEAR
OD_CC: 20/30
OS_CC+: -1

## 2023-07-10 ASSESSMENT — REFRACTION_MANIFEST
OD_ADD: +2.50
OD_CYLINDER: +0.50
OS_CYLINDER: +0.50
OS_AXIS: 126
OS_SPHERE: -2.50
OD_SPHERE: -2.25
OD_AXIS: 170
OS_ADD: +2.50

## 2023-07-10 ASSESSMENT — CUP TO DISC RATIO
OS_RATIO: 0.3
OD_RATIO: 0.2

## 2023-07-10 ASSESSMENT — EXTERNAL EXAM - RIGHT EYE: OD_EXAM: NORMAL

## 2023-07-10 ASSESSMENT — EXTERNAL EXAM - LEFT EYE: OS_EXAM: NORMAL

## 2023-07-10 ASSESSMENT — SLIT LAMP EXAM - LIDS: COMMENTS: NORMAL

## 2023-07-10 NOTE — PATIENT INSTRUCTIONS
"Continue:   Timolol (yellow top) every evening left eye.     Glasses prescription given - optional  May use artificial tears up to four times a day (like Refresh Optive, Systane Balance, TheraTears, or generic artificial tears are ok. Avoid \"get the red out\" drops).   Return visit 6 months for an intraocular pressure check, glaucoma OCT, retinal OCT, and George Visual Field.   Jerardo Orta M.D.  [phone]    "

## 2023-07-10 NOTE — Clinical Note
7/10/2023         RE: Micky Topete  [address] [facility] [id number]        Dear Colleague,    Thank you for referring your patient, Micky Topete, to the [facility]. Please see a copy of my visit note below.    No notes on file    Again, thank you for allowing me to participate in the care of your patient.        Sincerely,        Jerardo Orta MD   Caller: patient  pregnancy, Prenatal care - CNM   Details of condition: LMP 7/30, positive at home test 9/5. Patient calling to schedule prenatal care with CNM. Patient aware of shared practice and delivery rotation. E-mail confirmed for [employer] belkys. Please place orders and call if necessary.  Pharmacy verified? No  Appointment offered? Yes  Best time to reach patient: Any  Patient would like a call back at Mobile   (enter if call back number is different from primary phone number)  Okay to leave a detailed voicemail? na

## 2023-07-10 NOTE — PROGRESS NOTES
" Current Eye Medications:  Timolol at bedtime left eye, last took at 11 pm. Changed from Latanoprost, because eyelash growing     Subjective:  Complete eye exam. Vision is doing OK, unchanged. Having some burning and watering both eyes, usually while driving for last couple years.      Objective:  See Ophthalmology Exam.       Assessment:  Stable intraocular pressures and discs both eyes in patient who is a treated glaucoma suspect with pseudoexfoliation left eye.      ICD-10-CM    1. Encounter for examination of eyes and vision with abnormal findings  Z01.01       2. Presbyopia  H52.4       3. Glaucoma suspect of left eye - treated  H40.002       4. 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 Posterior vitreous detachment of both eyes  H43.813            Plan:  Continue:   Timolol (yellow top) every evening left eye.     Glasses prescription given - optional  May use artificial tears up to four times a day (like Refresh Optive, Systane Balance, TheraTears, or generic artificial tears are ok. Avoid \"get the red out\" drops).   Return visit 6 months for an intraocular pressure check, glaucoma OCT, retinal OCT, and George Visual Field.   Jerardo Orta M.D.  [phone]           "

## 2023-08-06 ENCOUNTER — MYC MEDICAL ADVICE (OUTPATIENT)
Dept: DERMATOLOGY | Facility: CLINIC | Age: 68
End: 2023-08-06
Payer: COMMERCIAL

## 2023-08-22 ENCOUNTER — OFFICE VISIT (OUTPATIENT)
Dept: FAMILY MEDICINE | Facility: CLINIC | Age: 68
End: 2023-08-22
Payer: COMMERCIAL

## 2023-08-22 VITALS
SYSTOLIC BLOOD PRESSURE: 133 MMHG | DIASTOLIC BLOOD PRESSURE: 80 MMHG | WEIGHT: 179 LBS | BODY MASS INDEX: 24.24 KG/M2 | TEMPERATURE: 97.8 F | HEART RATE: 67 BPM | OXYGEN SATURATION: 97 % | HEIGHT: 72 IN

## 2023-08-22 DIAGNOSIS — Z00.00 ENCOUNTER FOR ANNUAL WELLNESS EXAM IN MEDICARE PATIENT: ICD-10-CM

## 2023-08-22 DIAGNOSIS — L98.9 SKIN LESION OF NECK: ICD-10-CM

## 2023-08-22 DIAGNOSIS — L82.1 SEBORRHEIC KERATOSES: ICD-10-CM

## 2023-08-22 DIAGNOSIS — L98.9 SKIN LESION OF LEFT LOWER EXTREMITY: Primary | ICD-10-CM

## 2023-08-22 PROCEDURE — 88305 TISSUE EXAM BY PATHOLOGIST: CPT | Mod: 59 | Performed by: DERMATOLOGY

## 2023-08-22 PROCEDURE — 11104 PUNCH BX SKIN SINGLE LESION: CPT | Performed by: FAMILY MEDICINE

## 2023-08-22 PROCEDURE — G0439 PPPS, SUBSEQ VISIT: HCPCS | Performed by: FAMILY MEDICINE

## 2023-08-22 PROCEDURE — 11105 PUNCH BX SKIN EA SEP/ADDL: CPT | Performed by: FAMILY MEDICINE

## 2023-08-22 ASSESSMENT — PAIN SCALES - GENERAL: PAINLEVEL: NO PAIN (0)

## 2023-08-22 NOTE — TELEPHONE ENCOUNTER
Patient Quality Outreach    Patient is due for the following:   Physical Annual Wellness Visit    Next Steps:   See below    Type of outreach:    Patient in office and discussed HM due, patient was willing to do his wellness ao I coverted his appt to a wellness as well.    Next Steps:  Reach out within 90 days via  done .    Max number of attempts reached: Yes. Will try again in 90 days if patient still on fail list.    Questions for provider review:    None           Haydee Bonilla [facility]  Chart routed to closing encounter.

## 2023-08-22 NOTE — PROGRESS NOTES
Assessment & Plan       ICD-10-CM    1. Skin lesion of left lower extremity  L98.9 VT PUNCH BIOPSY OF SKIN, FIRST/SINGLE LESION     Surgical Pathology Exam      2. Skin lesion of neck  L98.9 VT PUNCH BIOPSY OF SKIN, EA SEPARATE/ADDL LESION     Surgical Pathology Exam      3. Seborrheic keratoses  L82.1       4. Encounter for annual wellness exam in Medicare patient  Z00.00         I suspect these 2 skin lesions will both be basal cell carcinomas  I will inform him of the biopsy results when available and then we will likely proceed with excision of the 2 skin lesions  He was reassured about the seborrheic keratoses  The rest of his exam is unremarkable and he is up-to-date on routine health care  We discussed having him get an updated COVID booster this fall along with a flu shot  He had normal lab work last year, and he is not fasting today, so we elected to pass on lab work for this year      Federico Salas MD  [facility] TERA Nix is a 68 year old, presenting for the following health issues:  Derm Problem (2 spots that need looking at.)      8/22/2023     8:04 AM   Additional Questions   Roomed by cam   Accompanied by none         8/22/2023     8:04 AM   Patient Reported Additional Medications   Patient reports taking the following new medications none       History of Present Illness       Reason for visit:  Skin lesion check    He eats 0-1 servings of fruits and vegetables daily.He consumes 1 sweetened beverage(s) daily.He exercises with enough effort to increase his heart rate 9 or less minutes per day.  He exercises with enough effort to increase his heart rate 3 or less days per week.   He is taking medications regularly.     He has had numerous basal cell carcinomas in the past.  He has noticed a nonhealing spot on his left lower leg here for a number of weeks.  There is also a spot on his back that he cannot see that his wife wanted to have checked out.  He is also due  "for annual wellness exam and is willing to have that done today.    Annual Wellness Visit    Patient has been advised of split billing requirements and indicates understanding: Yes     Are you in the first 12 months of your Medicare Part B coverage?  No    Physical Health:  In general, how would you rate your overall physical health? good  Outside of work, how many days during the week do you exercise?1 day/week  Outside of work, approximately how many minutes a day do you exercise?greater than 60 minutes  If you drink alcohol do you typically have >3 drinks per day or >7 drinks per week? No  Do you usually eat at least 4 servings of fruit and vegetables a day, include whole grains & fiber and avoid regularly eating high fat or \"junk\" foods? Yes  Do you have any problems taking medications regularly? No  Do you have any side effects from medications? none  Needs assistance for the following daily activities: no assistance needed  Which of the following safety concerns are present in your home?  none identified   Hearing impairment: No  In the past 6 months, have you been bothered by leaking of urine? no    Mental Health:  In general, how would you rate your overall mental or emotional health? good  PHQ-2 Score:      Do you feel safe in your environment? Yes    Have you ever done Advance Care Planning? (For example, a Health Directive, POLST, or a discussion with a medical provider or your loved ones about your wishes)? No, advance care planning information given to patient to review.  Patient plans to discuss their wishes with loved ones or provider.      Fall risk:  Fallen 2 or more times in the past year?: No  Any fall with injury in the past year?: No    Cognitive Screenin) Repeat 3 items (Leader, Season, Table)    2) Clock draw: NORMAL  3) 3 item recall: Recalls 3 objects  Results: NORMAL clock, 1-2 items recalled: COGNITIVE IMPAIRMENT LESS LIKELY    Mini-CogTM Copyright [employer] [place]. Licensed by the author " for use in [facility]; reprinted with permission (sofermin@.[email or web address]). All rights reserved.      Do you have sleep apnea, excessive snoring or daytime drowsiness? : no    Social History     Tobacco Use    Smoking status: Never    Smokeless tobacco: Never   Substance Use Topics    Alcohol use: Yes             7/14/2022     1:51 PM   Alcohol Use   Prescreen: >3 drinks/day or >7 drinks/week? No     Do you have a current opioid prescription? No  Do you use any other controlled substances or medications that are not prescribed by a provider? None    Current providers sharing in care for this patient include:   Patient Care Team:  Federico Salas MD as PCP - General  Federico Salas MD as Assigned PCP  Joseph Barnard MD as MD (Dermatology)  Jerardo Orta MD as Assigned Surgical Provider    The following health maintenance items are reviewed in Epic and correct as of today:  Health Maintenance   Topic Date Due    COVID-19 Vaccine (5 - Additional dose for Stevenson series) 05/11/2023    MEDICARE ANNUAL WELLNESS VISIT  07/21/2023    ANNUAL REVIEW OF HM ORDERS  07/21/2023    INFLUENZA VACCINE (1) 09/01/2023    EYE EXAM  07/10/2024    FALL RISK ASSESSMENT  08/22/2024    COLORECTAL CANCER SCREENING  03/31/2027    LIPID  07/21/2027    ADVANCE CARE PLANNING  07/21/2027    DTAP/TDAP/TD IMMUNIZATION (4 - Td or Tdap) 04/23/2029    HEPATITIS C SCREENING  Completed    PHQ-2 (once per calendar year)  Completed    Pneumococcal Vaccine: 65+ Years  Completed    ZOSTER IMMUNIZATION  Completed    AORTIC ANEURYSM SCREENING (SYSTEM ASSIGNED)  Completed    IPV IMMUNIZATION  Aged Out    MENINGITIS IMMUNIZATION  Aged Out       Patient has been advised of split billing requirements and indicates understanding: Yes    Appropriate preventive services were discussed with this patient, including applicable screening as appropriate for fall prevention, nutrition, physical activity, Tobacco-use cessation, weight loss and cognition.  " Checklist reviewing preventive services available has been given to the patient.        Review of Systems   Noncontributory except as above.      Objective    /80 (BP Location: Right arm, Patient Position: Sitting, Cuff Size: Adult Regular)   Pulse 67   Temp 97.8  F (36.6  C) (Temporal)   Ht 1.838 m (6' 0.36\")   Wt 81.2 kg (179 lb)   SpO2 97%   BMI 24.03 kg/m    Body mass index is 24.03 kg/m .  Physical Exam   GENERAL: healthy, alert and no distress  EYES: Eyes grossly normal to inspection, PERRL and conjunctivae and sclerae normal  HENT: Grossly normal  NECK: no adenopathy, no asymmetry, masses, or scars and thyroid normal to palpation  RESP: lungs clear to auscultation - no rales, rhonchi or wheezes  CV: regular rate and rhythm, normal S1 S2, no S3 or S4, no murmur, click or rub, no peripheral edema  ABDOMEN: Benign  MS: no gross musculoskeletal defects noted, no edema  SKIN: He has a roughly 6 mm diameter erythematous slightly raised lesion on the left medial calf with slightly raised border suspicious for a basal cell carcinoma.  He has a skin lesion on the left mid back which is slightly raised and brown-colored and looks excoriated and looks like a seborrheic keratosis that was picked at.  This was the lesion of concern on the back.  He has some other typical appearing seborrheic keratoses on his back and trunk.  He also has a roughly 2 x 4 mm raised erythematous skin lesion on the base of his neck on the left side which looks suspicious for basal cell carcinoma.  After discussion with the patient, the left lower leg and lower neck skin lesions were cleansed, anesthetized, and then biopsied with a 3 mm punch and a 2 mm punch, respectively.  Hemostasis was obtained by direct pressure and then they were bandaged.  NEURO: Normal strength and tone, mentation intact and speech normal  PSYCH: mentation appears normal, affect normal/bright    Past lab values were reviewed.                  "

## 2023-08-28 LAB
PATH REPORT.COMMENTS IMP SPEC: ABNORMAL
PATH REPORT.COMMENTS IMP SPEC: YES
PATH REPORT.COMMENTS IMP SPEC: YES
PATH REPORT.FINAL DX SPEC: ABNORMAL
PATH REPORT.FINAL DX SPEC: ABNORMAL
PATH REPORT.GROSS SPEC: ABNORMAL
PATH REPORT.GROSS SPEC: ABNORMAL
PATH REPORT.MICROSCOPIC SPEC OTHER STN: ABNORMAL
PATH REPORT.MICROSCOPIC SPEC OTHER STN: ABNORMAL
PATH REPORT.RELEVANT HX SPEC: ABNORMAL
PATH REPORT.RELEVANT HX SPEC: ABNORMAL
PHOTO IMAGE: ABNORMAL
PHOTO IMAGE: ABNORMAL

## 2023-08-28 NOTE — RESULT ENCOUNTER NOTE
Boyd,  The skin lesion at the base of your neck turned out to be benign, so that is good, but the one on your left lower leg was indeed another basal cell carcinoma.  Please go ahead and schedule removal of that skin lesion with either me or your dermatologist.  If you would like me to remove it and you are having trouble finding a good appointment day and time for that, please let me know and I will find something that works for you.    Shravan

## 2023-09-21 ENCOUNTER — OFFICE VISIT (OUTPATIENT)
Dept: FAMILY MEDICINE | Facility: CLINIC | Age: 68
End: 2023-09-21
Payer: COMMERCIAL

## 2023-09-21 VITALS
HEIGHT: 72 IN | TEMPERATURE: 98 F | DIASTOLIC BLOOD PRESSURE: 71 MMHG | OXYGEN SATURATION: 97 % | SYSTOLIC BLOOD PRESSURE: 123 MMHG | BODY MASS INDEX: 23.84 KG/M2 | HEART RATE: 69 BPM | WEIGHT: 176 LBS

## 2023-09-21 DIAGNOSIS — C44.719 BASAL CELL CARCINOMA OF LEFT LOWER EXTREMITY: Primary | ICD-10-CM

## 2023-09-21 PROCEDURE — 11602 EXC TR-EXT MAL+MARG 1.1-2 CM: CPT | Performed by: FAMILY MEDICINE

## 2023-09-21 PROCEDURE — 12032 INTMD RPR S/A/T/EXT 2.6-7.5: CPT | Performed by: FAMILY MEDICINE

## 2023-09-21 PROCEDURE — 88305 TISSUE EXAM BY PATHOLOGIST: CPT | Performed by: PATHOLOGY

## 2023-09-21 ASSESSMENT — PAIN SCALES - GENERAL: PAINLEVEL: NO PAIN (0)

## 2023-09-21 NOTE — PROGRESS NOTES
Assessment & Plan       ICD-10-CM    1. Basal cell carcinoma of left lower extremity  C44.719 Surgical Pathology Exam     REPAIR INTERMED, WOUND TRUNK/ARM/LEG 2.6-7.5 CM     EXC MALIG SKIN LESION TRUNK/ARM/LEG 1.1-2.0 CM        Routine wound care for the excision site  We will check pathology results to confirm clear margins  He typically likes to take the sutures out himself, so I advised him to do so in 10 to 12 days  Follow-up on a prn basis      Federico Salas MD  [facility] [place]    Subjective   Boyd is a 68 year old, presenting for the following health issues:  Lesion Removal      9/21/2023    10:22 AM   Additional Questions   Roomed by cam   Accompanied by none         9/21/2023    10:22 AM   Patient Reported Additional Medications   Patient reports taking the following new medications none       History of Present Illness       Reason for visit:  Basal cell carcinoma removal left leg    He eats 2-3 servings of fruits and vegetables daily.He consumes 1 sweetened beverage(s) daily.He exercises with enough effort to increase his heart rate 9 or less minutes per day.  He exercises with enough effort to increase his heart rate 3 or less days per week.   He is taking medications regularly.     Boyd was in last month and we identified a basal cell carcinoma in his left medial pretibial/calf region.  See previous note on this.  He has had numerous basal cell carcinomas in the past.  He is coming in now for excision of this.    Patient Active Problem List   Diagnosis    Hyperlipidemia with target LDL less than 130    Advanced directives, counseling/discussion    History of basal cell cancer    Cataract of both eyes    Actinic keratoses    Erectile dysfunction, unspecified erectile dysfunction type    Right-sided low back pain without sciatica, unspecified chronicity    Posterior vitreous detachment of both eyes    Glaucoma suspect of left eye - treated    Pseudoexfoliation (PXF) of left lens capsule     Umbilical hernia without obstruction and without gangrene     Current Outpatient Medications   Medication    diclofenac (VOLTAREN) 1 % topical gel    sildenafil (REVATIO) 20 MG tablet    timolol maleate (TIMOPTIC) 0.5 % ophthalmic solution    valACYclovir (VALTREX) 1000 mg tablet     No current facility-administered medications for this visit.           Review of Systems   Noncontributory except as above.      Objective    /71 (BP Location: Right arm, Patient Position: Sitting, Cuff Size: Adult Regular)   Pulse 69   Temp 98  F (36.7  C) (Oral)   Ht 1.829 m (6')   Wt 79.8 kg (176 lb)   SpO2 97%   BMI 23.87 kg/m    Body mass index is 23.87 kg/m .  Physical Exam   GENERAL: healthy, alert and no distress  SKIN: The erythematous raised pearly one half to three-quarter centimeter diameter lesion on the left medial pretibial/calf region was again identified.  After discussion with the patient, including risks and benefits of the procedure, we elected to proceed with the removal.  The lesion was cleansed with alcohol and then anesthetized with 1% lidocaine with epinephrine.  An elliptical excision was made around the lesion with roughly 2 mm borders in its width so that the total width of the excision was approximately 1.1 cm and the length was roughly 3 cm.   The wound was then closed with 3 4-0 vicryl subcuticular sutures in a layered closure followed by 5 4-0 nylon sutures through the skin.  Bacitracin and a sterile dressing were then applied.  The removed specimen was sent to pathology.    Office Visit on 08/22/2023   Component Date Value Ref Range Status    Case Report 08/22/2023    Final                    Value:Surgical Pathology Report                         Case: [id number]                                  Authorizing Provider:  Federico Salas MD        Collected:           08/22/2023 08:58 AM          Ordering Location:     [facility]   Received:            08/22/2023 09:23 AM                                  Heather                                                                      Pathologist:           Brennen Jha MD                                                       Specimens:   A) - Neck                                                                                           B) - Leg, Below Knee, Left                                                                 Final Diagnosis 08/22/2023    Final                    Value:This result contains rich text formatting which cannot be displayed here.    Comment 08/22/2023    Final                    Value:This result contains rich text formatting which cannot be displayed here.    Clinical Information 08/22/2023    Final                    Value:This result contains rich text formatting which cannot be displayed here.    Gross Description 08/22/2023    Final                    Value:This result contains rich text formatting which cannot be displayed here.    Microscopic Description 08/22/2023    Final                    Value:This result contains rich text formatting which cannot be displayed here.    MCRS 08/22/2023 Yes (A)  N/A Final    Performing Labs 08/22/2023    Final                    Value:This result contains rich text formatting which cannot be displayed here.    Case Report 08/22/2023    Final                    Value:Surgical Pathology Report                         Case: [id number]                                  Authorizing Provider:  Federico Salas MD        Collected:           08/22/2023 08:58 AM          Ordering Location:     [facility]   Received:            08/22/2023 09:23 AM                                 He[place]                                                                      Pathologist:           Brennen Jha MD                                                       Specimens:   A) - Neck                                                                                            B) - Leg, Below Knee, Left                                                                 Final Diagnosis 08/22/2023    Final                    Value:This result contains rich text formatting which cannot be displayed here.    Comment 08/22/2023    Final                    Value:This result contains rich text formatting which cannot be displayed here.    Clinical Information 08/22/2023    Final                    Value:This result contains rich text formatting which cannot be displayed here.    Gross Description 08/22/2023    Final                    Value:This result contains rich text formatting which cannot be displayed here.    Microscopic Description 08/22/2023    Final                    Value:This result contains rich text formatting which cannot be displayed here.    MCRS 08/22/2023 Yes (A)  N/A Final    Performing Labs 08/22/2023    Final                    Value:This result contains rich text formatting which cannot be displayed here.

## 2023-09-25 NOTE — RESULT ENCOUNTER NOTE
Boyd,  Good news on the pathology report.  It confirmed that the basal cell carcinoma was completely removed (with clear margins)from your left lower leg.  Hopefully that spot will heal up well for you.  You can remove the stitches from that area in a week or so from now if you would like.    Shravan

## 2023-10-18 DIAGNOSIS — T75.3XXD MOTION SICKNESS, SUBSEQUENT ENCOUNTER: Primary | ICD-10-CM

## 2023-10-18 RX ORDER — SCOLOPAMINE TRANSDERMAL SYSTEM 1 MG/1
1 PATCH, EXTENDED RELEASE TRANSDERMAL
Qty: 4 PATCH | Refills: 1 | Status: SHIPPED | OUTPATIENT
Start: 2023-10-18 | End: 2024-04-17

## 2023-10-18 NOTE — PROGRESS NOTES
Patient requests prescription for Transderm scop patches for an upcoming cruise.  This was provided for him.    Federico Salas MD

## 2023-11-01 ENCOUNTER — OFFICE VISIT (OUTPATIENT)
Dept: FAMILY MEDICINE | Facility: CLINIC | Age: 68
End: 2023-11-01
Payer: COMMERCIAL

## 2023-11-01 VITALS
BODY MASS INDEX: 24.21 KG/M2 | DIASTOLIC BLOOD PRESSURE: 82 MMHG | SYSTOLIC BLOOD PRESSURE: 141 MMHG | TEMPERATURE: 97.5 F | HEART RATE: 71 BPM | OXYGEN SATURATION: 99 % | WEIGHT: 178.5 LBS

## 2023-11-01 DIAGNOSIS — L08.9 INFECTED SKIN LESION: Primary | ICD-10-CM

## 2023-11-01 PROCEDURE — 99213 OFFICE O/P EST LOW 20 MIN: CPT

## 2023-11-01 RX ORDER — DOXYCYCLINE HYCLATE 100 MG
100 TABLET ORAL 2 TIMES DAILY
Qty: 14 TABLET | Refills: 0 | Status: SHIPPED | OUTPATIENT
Start: 2023-11-01 | End: 2023-11-08

## 2023-11-01 NOTE — PROGRESS NOTES
"  Assessment & Plan     Infected skin lesion  This appears to be an infected seborrheic keratosis.  We will treat with doxycycline for a week.  If it does not clear up or gets worse, recommend he go to his primary or dermatology to get this excised.    - doxycycline hyclate (VIBRA-TABS) 100 MG tablet; Take 1 tablet (100 mg) by mouth 2 times daily for 7 days       Return in about 1 week (around 11/8/2023), or if symptoms worsen or fail to improve.    [facility]    Meredith Nix is a 68 year old, presenting for the following health issues:  Urgent Care (Spot on side of leg - \"ugly black spot\")      MARCIA Lepe presents with a lesion on the lateral part of his mid left thigh that he noticed 5 days ago.  He states it looked like an a seborrheic keratosis.  About a day and a half ago he noticed that it was painful and there was a red halo around it.  Does not recall getting bit by any type of insect, denies any trauma to the area.  No fevers, malaise or achy joints.  Has not tried anything on the lesion.        Review of Systems   Constitutional, HEENT, cardiovascular, pulmonary, gi and gu systems are negative, except as otherwise noted.      Objective    BP (!) 141/82   Pulse 71   Temp 97.5  F (36.4  C) (Oral)   Wt 81 kg (178 lb 8 oz)   SpO2 99%   BMI 24.21 kg/m    Body mass index is 24.21 kg/m .  Physical Exam   GENERAL: healthy, alert and no distress  SKIN:   In the center is a rough gray-colored lesion with brown edges.  It measures approximately 1 cm lengthwise, and is surrounded by a erythemic halo.  There is no drainage from the site and the gray area is raised and rough in texture                  "

## 2023-11-01 NOTE — PATIENT INSTRUCTIONS
Recommend if the lesion does not clear up with treatment, or increases in size that you see your primary care provider or dermatology to get this removed.

## 2024-02-29 ENCOUNTER — OFFICE VISIT (OUTPATIENT)
Dept: DERMATOLOGY | Facility: CLINIC | Age: 69
End: 2024-02-29
Payer: COMMERCIAL

## 2024-02-29 DIAGNOSIS — L57.0 AK (ACTINIC KERATOSIS): ICD-10-CM

## 2024-02-29 DIAGNOSIS — D48.9 NEOPLASM OF UNCERTAIN BEHAVIOR: ICD-10-CM

## 2024-02-29 DIAGNOSIS — Z85.828 HISTORY OF BASAL CELL CARCINOMA (BCC): ICD-10-CM

## 2024-02-29 DIAGNOSIS — D18.01 CHERRY ANGIOMA: ICD-10-CM

## 2024-02-29 DIAGNOSIS — L82.1 SEBORRHEIC KERATOSES: ICD-10-CM

## 2024-02-29 DIAGNOSIS — L81.4 LENTIGINES: ICD-10-CM

## 2024-02-29 DIAGNOSIS — Z12.83 ENCOUNTER FOR SCREENING FOR MALIGNANT NEOPLASM OF SKIN: ICD-10-CM

## 2024-02-29 DIAGNOSIS — D22.9 MULTIPLE BENIGN NEVI: Primary | ICD-10-CM

## 2024-02-29 PROCEDURE — 11102 TANGNTL BX SKIN SINGLE LES: CPT | Performed by: NURSE PRACTITIONER

## 2024-02-29 PROCEDURE — 88305 TISSUE EXAM BY PATHOLOGIST: CPT | Performed by: DERMATOLOGY

## 2024-02-29 PROCEDURE — 17000 DESTRUCT PREMALG LESION: CPT | Mod: XS | Performed by: NURSE PRACTITIONER

## 2024-02-29 PROCEDURE — 17003 DESTRUCT PREMALG LES 2-14: CPT | Performed by: NURSE PRACTITIONER

## 2024-02-29 PROCEDURE — 99213 OFFICE O/P EST LOW 20 MIN: CPT | Mod: 25 | Performed by: NURSE PRACTITIONER

## 2024-02-29 NOTE — LETTER
2/29/2024         RE: Micky Topete  [address] [id number]        Dear Colleague,    Thank you for referring your patient, Micky Topete, to the [facility]. Please see a copy of my visit note below.    [facility] Dermatology Note  Encounter Date: Feb 29, 2024  Office Visit     Reviewed patients past medical history and pertinent chart review prior to patients visit today.     Dermatology Problem List:  NUB mid upper back, shave biopsy 02/29/24 .   History of BCC  BCC  On right cheek removed 10/21  BCC on left leg removed 10/21  BCC on right leg removed 3/22  BCC below left knee excised by PCP 9/21/23    History of actinic keratosis treated with PDT and cryo     ____________________________________________    Assessment & Plan:     # History of multiple BCC, well-healed scars without signs of recurrence    # Neoplasm of uncertain behavior:  mid upper back  DDx includes BCC vs dermatitis. Shave biopsy today.    Procedure Note: Biopsy by shave technique  The risks and benefits of the procedure were described to the patient. These include but are not limited to bleeding, infection, scar, incomplete removal, and non-diagnostic biopsy. Verbal informed consent was obtained. The above site(s) was cleansed with an alcohol pad and injected with 1% lidocaine with epinephrine. Once anesthesia was obtained, a biopsy(ies) was performed with Gilette blade. The tissue(s) was placed in a labeled container(s) with formalin and sent to pathology. Hemostasis was achieved with aluminum chloride. Vaseline and a bandage were applied to the wound(s). The patient tolerated the procedure well and was given post biopsy care instructions.     # Actinic keratosis face and right dorsum hand. Premalignant nature discussed with patient. Treatment options discussed with patient today including no treatment, topical treatment, and cryotherapy. Patient elects to treat  visible lesions today with cryotherapy. After verbal consent and discussion of risks and benefits including but no limited to dyspigmentation/scar, blister, and pain. A total of 5 actinic keratoses were treated with 1-2mm freeze border for 2 cycle with liquid nitrogen. Post cryotherapy instructions were provided.    # Benign skin findings including: seborrheic keratoses, cherry angioma, lentigines and benign nevi.   - No further intervention required. Patient to report changes.   - Patient reassured of the benign nature of these lesions.    #Signs and Symptoms of non-melanoma skin cancer and ABCDEs of melanoma reviewed with patient. Patient encouraged to perform monthly self skin exams and educated on how to perform them. UV precautions reviewed with patient. Patient was asked about new or changing moles/lesions on body.     #Reviewed Sunscreen: Apply 20 minutes prior to going outdoors and reapply every two hours, when wet or sweating. We recommend using an SPF 30 or higher, and to use one that is water resistant.       Follow-up:  1 years for follow up full body skin exam, prn for new or changing lesions or new concerns    Juana Dickson, [facility]  Dermatology   ____________________________________________    CC: Skin Check (Web of thumb on right hand, possible BCC 9/2023 with PCP )    HPI:  Mr. Micky Topete is a(n) 68 year old male who presents today as a return patient for a full body skin cancer screening. Patient has concerns today about a rough textured pink spot on the top of the right hand.     Patient is otherwise feeling well, without additional skin concerns.     Physical Exam:  Vitals: There were no vitals taken for this visit.  SKIN: Total skin excluding the genitalia areas was performed. The exam included the head/face, neck, both arms, chest, back, abdomen, both legs, digits, mons pubis, buttock and nails.   -Well-healed scars on the right cheek, left leg x2, and right lower leg without signs of recurrent  malignancies  -mid upper back, 5 mm ill defined pink papule with a few vessels on dermoscopy  -There is/are 5 erythematous macules with overlying adherent scale on the face x4 and one on right dorsum hand.    -Left lower leg x2, grey/brown firm papules with stellate center on dermoscopy and positive dimples sign .    -several 1-2mm red dome shaped symmetric papules scattered on the trunk  -multiple tan/brown flat round macules and raised papules scattered throughout trunk, extremities and head. No worrisome features for malignancy noted on examination.  -scattered tan, homogenous macules scattered on sun exposed areas of trunk, extremities and face.   -scattered waxy, stuck on tan/brown papules and patches on the trunk     - No other lesions of concern on areas examined.     Medications:  Current Outpatient Medications   Medication     diclofenac (VOLTAREN) 1 % topical gel     scopolamine (TRANSDERM) 1 MG/3DAYS 72 hr patch     sildenafil (REVATIO) 20 MG tablet     timolol maleate (TIMOPTIC) 0.5 % ophthalmic solution     valACYclovir (VALTREX) 1000 mg tablet     No current facility-administered medications for this visit.      Past Medical History:   Patient Active Problem List   Diagnosis     Hyperlipidemia with target LDL less than 130     Advanced directives, counseling/discussion     History of basal cell cancer     Cataract of both eyes     Actinic keratoses     Erectile dysfunction, unspecified erectile dysfunction type     Right-sided low back pain without sciatica, unspecified chronicity     Posterior vitreous detachment of both eyes     Glaucoma suspect of left eye - treated     Pseudoexfoliation (PXF) of left lens capsule     Umbilical hernia without obstruction and without gangrene     Past Medical History:   Diagnosis Date     Actinic keratoses      Basal cell carcinoma (BCC) of left lower extremity 09/2023    left medial calf     Basal cell carcinoma of left lower extremity 05/2019    left anterior thigh      Basal cell carcinoma of left upper arm 05/2021    just above left elbow     Basal cell carcinoma of left upper extremity 11/2009    left forearm     Basal cell carcinoma of right upper extremity 03/2020    right upper outer arm     BCC (basal cell carcinoma), face 10/2021    removed via Mohs by Dr. Barnard     Cataract 09/20/2012     Glaucoma (increased eye pressure)      Hyperlipidemia LDL goal < 130      Right-sided low back pain without sciatica, unspecified chronicity age 27       CC No referring provider defined for this encounter. on close of this encounter.      Again, thank you for allowing me to participate in the care of your patient.        Sincerely,        J CARLSO Henry CNP

## 2024-02-29 NOTE — PROGRESS NOTES
[facility] Dermatology Note  Encounter Date: Feb 29, 2024  Office Visit     Reviewed patients past medical history and pertinent chart review prior to patients visit today.     Dermatology Problem List:  NUB mid upper back, shave biopsy 02/29/24 .   History of BCC  BCC  On right cheek removed 10/21  BCC on left leg removed 10/21  BCC on right leg removed 3/22  BCC below left knee excised by PCP 9/21/23    History of actinic keratosis treated with PDT and cryo     ____________________________________________    Assessment & Plan:     # History of multiple BCC, well-healed scars without signs of recurrence    # Neoplasm of uncertain behavior:  mid upper back  DDx includes BCC vs dermatitis. Shave biopsy today.    Procedure Note: Biopsy by shave technique  The risks and benefits of the procedure were described to the patient. These include but are not limited to bleeding, infection, scar, incomplete removal, and non-diagnostic biopsy. Verbal informed consent was obtained. The above site(s) was cleansed with an alcohol pad and injected with 1% lidocaine with epinephrine. Once anesthesia was obtained, a biopsy(ies) was performed with Gilette blade. The tissue(s) was placed in a labeled container(s) with formalin and sent to pathology. Hemostasis was achieved with aluminum chloride. Vaseline and a bandage were applied to the wound(s). The patient tolerated the procedure well and was given post biopsy care instructions.     # Actinic keratosis face and right dorsum hand. Premalignant nature discussed with patient. Treatment options discussed with patient today including no treatment, topical treatment, and cryotherapy. Patient elects to treat visible lesions today with cryotherapy. After verbal consent and discussion of risks and benefits including but no limited to dyspigmentation/scar, blister, and pain. A total of 5 actinic keratoses were treated with 1-2mm freeze border for 2 cycle with liquid  nitrogen. Post cryotherapy instructions were provided.    # Benign skin findings including: seborrheic keratoses, cherry angioma, lentigines and benign nevi.   - No further intervention required. Patient to report changes.   - Patient reassured of the benign nature of these lesions.    #Signs and Symptoms of non-melanoma skin cancer and ABCDEs of melanoma reviewed with patient. Patient encouraged to perform monthly self skin exams and educated on how to perform them. UV precautions reviewed with patient. Patient was asked about new or changing moles/lesions on body.     #Reviewed Sunscreen: Apply 20 minutes prior to going outdoors and reapply every two hours, when wet or sweating. We recommend using an SPF 30 or higher, and to use one that is water resistant.       Follow-up:  1 years for follow up full body skin exam, prn for new or changing lesions or new concerns    Juana Dickson CNP  Dermatology   ____________________________________________    CC: Skin Check (Web of thumb on right hand, possible BCC 9/2023 with PCP )    HPI:  Mr. Micky Topete is a(n) 68 year old male who presents today as a return patient for a full body skin cancer screening. Patient has concerns today about a rough textured pink spot on the top of the right hand.     Patient is otherwise feeling well, without additional skin concerns.     Physical Exam:  Vitals: There were no vitals taken for this visit.  SKIN: Total skin excluding the genitalia areas was performed. The exam included the head/face, neck, both arms, chest, back, abdomen, both legs, digits, mons pubis, buttock and nails.   -Well-healed scars on the right cheek, left leg x2, and right lower leg without signs of recurrent malignancies  -mid upper back, 5 mm ill defined pink papule with a few vessels on dermoscopy  -There is/are 5 erythematous macules with overlying adherent scale on the face x4 and one on right dorsum hand.    -Left lower leg x2, grey/brown firm papules with  stellate center on dermoscopy and positive dimples sign .    -several 1-2mm red dome shaped symmetric papules scattered on the trunk  -multiple tan/brown flat round macules and raised papules scattered throughout trunk, extremities and head. No worrisome features for malignancy noted on examination.  -scattered tan, homogenous macules scattered on sun exposed areas of trunk, extremities and face.   -scattered waxy, stuck on tan/brown papules and patches on the trunk     - No other lesions of concern on areas examined.     Medications:  Current Outpatient Medications   Medication    diclofenac (VOLTAREN) 1 % topical gel    scopolamine (TRANSDERM) 1 MG/3DAYS 72 hr patch    sildenafil (REVATIO) 20 MG tablet    timolol maleate (TIMOPTIC) 0.5 % ophthalmic solution    valACYclovir (VALTREX) 1000 mg tablet     No current facility-administered medications for this visit.      Past Medical History:   Patient Active Problem List   Diagnosis    Hyperlipidemia with target LDL less than 130    Advanced directives, counseling/discussion    History of basal cell cancer    Cataract of both eyes    Actinic keratoses    Erectile dysfunction, unspecified erectile dysfunction type    Right-sided low back pain without sciatica, unspecified chronicity    Posterior vitreous detachment of both eyes    Glaucoma suspect of left eye - treated    Pseudoexfoliation (PXF) of left lens capsule    Umbilical hernia without obstruction and without gangrene     Past Medical History:   Diagnosis Date    Actinic keratoses     Basal cell carcinoma (BCC) of left lower extremity 09/2023    left medial calf    Basal cell carcinoma of left lower extremity 05/2019    left anterior thigh    Basal cell carcinoma of left upper arm 05/2021    just above left elbow    Basal cell carcinoma of left upper extremity 11/2009    left forearm    Basal cell carcinoma of right upper extremity 03/2020    right upper outer arm    BCC (basal cell carcinoma), face 10/2021     removed via Mohs by Dr. Barnard    Cataract 09/20/2012    Glaucoma (increased eye pressure)     Hyperlipidemia LDL goal < 130     Right-sided low back pain without sciatica, unspecified chronicity age 27       CC No referring provider defined for this encounter. on close of this encounter.

## 2024-02-29 NOTE — PATIENT INSTRUCTIONS
Wound Care Instructions     FOR SUPERFICIAL WOUNDS     [facility], [facility] or    [facility] [phone]          AFTER 24 HOURS YOU SHOULD REMOVE THE BANDAGE AND BEGIN DAILY DRESSING CHANGES AS FOLLOWS:     1) Remove Dressing.     2) Clean and dry the area with tap water using a Q-tip or sterile gauze pad.     3) Apply Vaseline, Aquaphor, Polysporin ointment or Bacitracin ointment over entire wound.  Do NOT use Neosporin ointment.     4) Cover the wound with a band-aid, or a sterile non-stick gauze pad and micropore paper tape      REPEAT THESE INSTRUCTIONS AT LEAST ONCE A DAY UNTIL THE WOUND HAS COMPLETELY HEALED.    It is an old wives tale that a wound heals better when it is exposed to air and allowed to dry out. The wound will heal faster with a better cosmetic result if it is kept moist with ointment and covered with a bandage.    **Do not let the wound dry out.**      Supplies Needed:      *Cotton tipped applicators (Q-tips)    *Polysporin Ointment or Bacitracin Ointment (NOT NEOSPORIN)    *Band-aids or non-stick gauze pads and micropore paper tape.      PATIENT INFORMATION:    During the healing process you will notice a number of changes. All wounds develop a small halo of redness surrounding the wound.  This means healing is occurring. Severe itching with extensive redness usually indicates sensitivity to the ointment or bandage tape used to dress the wound.  You should call our office if this develops.      Swelling  and/or discoloration around your surgical site is common, particularly when performed around the eye.    All wounds normally drain.  The larger the wound the more drainage there will be.  After 7-10 days, you will notice the wound beginning to shrink and new skin will begin to grow.  The wound is healed when you can see skin has formed over the entire area.  A healed wound has a healthy, shiny look to the surface and is red to dark pink in color to normalize.   Wounds may take approximately 4-6 weeks to heal.  Larger wounds may take 6-8 weeks.  After the wound is healed you may discontinue dressing changes.    You may experience a sensation of tightness as your wound heals. This is normal and will gradually subside.    Your healed wound may be sensitive to temperature changes. This sensitivity improves with time, but if you re having a lot of discomfort, try to avoid temperature extremes.    Patients frequently experience itching after their wound appears to have healed because of the continue healing under the skin.  Plain Vaseline will help relieve the itching.        POSSIBLE COMPLICATIONS    BLEEDING:    Leave the bandage in place.  Use tightly rolled up gauze or a cloth to apply direct pressure over the bandage for 30  minutes.  Reapply pressure for an additional 30 minutes if necessary  Use additional gauze and tape to maintain pressure once the bleeding has stopped.     WOUND CARE INSTRUCTIONS  FOR CRYOSURGERY  For questions please call [phone]        This area treated with liquid nitrogen will form a blister. You do not need to bandage the area until after the blister forms and breaks (which may be a few days).  When the blister breaks, begin daily dressing changes as follows:    1) Clean and dry the area with tap water using clean Q-tip or sterile gauze pad.    2) Apply Vaseline or Aquaphor over entire wound. Other options include Polysporin ointment or Bacitracin ointment over entire wound.  Do NOT use Neosporin ointment.    3) Cover the wound with a band-aid or sterile non-stick gauze pad and micropore paper tape.      REPEAT THESE INSTRUCTIONS AT LEAST ONCE A DAY UNTIL THE WOUND HAS COMPLETELY HEALED.        It is an old wives tale that a wound heals better when it is exposed to air and allowed to dry out. The wound will heal faster with a better cosmetic result if it is kept moist with ointment and covered with a bandage.  Do not let the wound dry  out.      Supplies Needed:     *Cotton tipped applicators (Q-tips)   *Polysporin ointment or Bacitracin ointment (NOT NEOSPORIN)   *Band-aids, or non stick gauze pads and micropore paper tape    PATIENT INFORMATION    During the healing process you will notice a number of changes. All wounds develop a small halo of redness surrounding the wound.  This means healing is occurring. Severe itching with extensive redness usually indicates sensitivity to the ointment or bandage tape used to dress the wound.  You should call our office if this develops.      Swelling and/or discoloration around your surgical site is common, particularly when performed around the eye.    All wounds normally drain.  The larger the wound the more drainage there will be.  After 7-10 days, you will notice the wound beginning to shrink and new skin will begin to grow.  The wound is healed when you can see skin has formed over the entire area.  A healed wound has a healthy, shiny look to the surface and is red to dark pink in color to normalize.  Wounds may take approximately 4-6 weeks to heal.  Larger wounds may take 6-8 weeks.  After the wound is healed you may discontinue dressing changes.    You may experience a sensation of tightness as your wound heals. This is normal and will gradually subside.    Your healed wound may be sensitive to temperature changes. This sensitivity improves with time, but if you re having a lot of discomfort, try to avoid temperature extremes.    Patients frequently experience itching after their wound appears to have healed because of the continue healing under the skin.  Plain Vaseline will help relieve the itching.

## 2024-03-03 LAB
PATH REPORT.COMMENTS IMP SPEC: ABNORMAL
PATH REPORT.COMMENTS IMP SPEC: ABNORMAL
PATH REPORT.COMMENTS IMP SPEC: YES
PATH REPORT.FINAL DX SPEC: ABNORMAL
PATH REPORT.GROSS SPEC: ABNORMAL
PATH REPORT.MICROSCOPIC SPEC OTHER STN: ABNORMAL
PATH REPORT.RELEVANT HX SPEC: ABNORMAL

## 2024-03-04 ENCOUNTER — TELEPHONE (OUTPATIENT)
Dept: DERMATOLOGY | Facility: CLINIC | Age: 69
End: 2024-03-04
Payer: COMMERCIAL

## 2024-03-04 DIAGNOSIS — D48.9 NEOPLASM OF UNCERTAIN BEHAVIOR: Primary | ICD-10-CM

## 2024-03-04 NOTE — TELEPHONE ENCOUNTER
Left patient a voicemail to schedule an excision for Mid upper back:  - Basal cell carcinoma, with Dr. Holland or Dr. Eagle. Provided my direct phone number.    Lucy Benjamin on 3/4/2024 at 10:38 AM

## 2024-03-04 NOTE — TELEPHONE ENCOUNTER
Called and spoke with pt, discussed diagnosis and treatment options. Pt prefers to have excision at . Referral sent.    Thank you,  Angela JOSEPH RN  Dermatology   [phone]

## 2024-03-04 NOTE — TELEPHONE ENCOUNTER
----- Message from Jennifer Gongora RN sent at 3/4/2024  8:15 AM CST -----    ----- Message -----  From: Claudia Dickson APRN CNP  Sent: 3/4/2024   8:14 AM CST  To:  Skin Nurse Pool    Please call the patient and discuss options of ED&C versus excision for treatment of skin cancer.  Schedule ED&C with me or excision with dermatology surgery.     Final Diagnosis   Mid upper back:  - Basal cell carcinoma, superficial type - (see description)

## 2024-03-08 NOTE — TELEPHONE ENCOUNTER
Called patient to schedule surgery with Dr. Eagle    Date of Surgery: 04/23    Surgery type: excision     Consult scheduled: No    Has patient had mohs with us before? No    Additional comments: pt's wife has had skin cancer surgery with Dr. Eagle previously       Lucy Benjamin on 3/8/2024 at 2:45 PM

## 2024-03-08 NOTE — TELEPHONE ENCOUNTER
Left patient a voicemail to schedule an excision for Mid upper back: - Basal cell carcinoma,  with Dr. Eagle or Dr. Holland in . Provided my direct phone number.    Lucy Cassidy on 3/8/2024 at 2:25 PM

## 2024-04-17 ENCOUNTER — TELEPHONE (OUTPATIENT)
Dept: DERMATOLOGY | Facility: CLINIC | Age: 69
End: 2024-04-17
Payer: COMMERCIAL

## 2024-04-17 NOTE — TELEPHONE ENCOUNTER
Excision/Mohs previsit information                                                    Diagnosis: basal cell carcinoma  Site(s): BCC    Over the counter Chlorhexidine surgical soap to wash all skin below the belly button twice before surgery should be recommended for the following:  - Surgical sites below the waist  - Immunosuppressed  - Previous surgical site infection  - Anticipated wound care challenges    Medication & Allergy Information                                                      Review and update allergy and medication list.    Do you take the following medications:  Coumadin, Eliquis, Pradaxa, Xarelto:  NO   -If on Coumadin, INR should be checked within 7 days of surgery.  Range should be 3.5 or less or within therapeutic range.    Past Medical History                                                    Do you currently or have you previously had any of the following conditions:    Hepatitis:  NO  HIV/AIDS:  NO  Prolonged bleeding or bleeding disorder:  NO  Pacemaker or Defibrillator:  NO.    History of artificial or heart valve replacement:  NO  Endocarditis (inflammation of the inner lining of the heart's chambers and valves):  NO  Have you ever had a prosthetic joint infection:  NO  Pregnant or Breastfeeding:  N/A  Mobility device (wheelchair, transfer difficulty): NO    Important Reminders:                                                      Ok to take all of their medications as prescribed  Patients can eat, no need to be fasting  Patient will not be able to get the site wet for 48 hrs  No submerging wound in standing water (lake, pool, bathtub, hot tub) for 2 weeks  No physical activity for 48 hrs (further restrictions will be discussed by MD at time of visit)    Arlyn Magdaleno RN

## 2024-04-22 PROCEDURE — 88305 TISSUE EXAM BY PATHOLOGIST: CPT | Performed by: DERMATOLOGY

## 2024-04-23 ENCOUNTER — OFFICE VISIT (OUTPATIENT)
Dept: DERMATOLOGY | Facility: CLINIC | Age: 69
End: 2024-04-23
Payer: COMMERCIAL

## 2024-04-23 VITALS — HEART RATE: 74 BPM | SYSTOLIC BLOOD PRESSURE: 129 MMHG | DIASTOLIC BLOOD PRESSURE: 79 MMHG

## 2024-04-23 DIAGNOSIS — C44.519 BASAL CELL CARCINOMA (BCC) OF UPPER BACK: Primary | ICD-10-CM

## 2024-04-23 DIAGNOSIS — D48.9 NEOPLASM OF UNCERTAIN BEHAVIOR: ICD-10-CM

## 2024-04-23 PROCEDURE — 11603 EXC TR-EXT MAL+MARG 2.1-3 CM: CPT | Performed by: DERMATOLOGY

## 2024-04-23 PROCEDURE — 12032 INTMD RPR S/A/T/EXT 2.6-7.5: CPT | Performed by: DERMATOLOGY

## 2024-04-23 ASSESSMENT — PAIN SCALES - GENERAL: PAINLEVEL: NO PAIN (0)

## 2024-04-23 NOTE — NURSING NOTE
Micky Topete's goals for this visit include:   Chief Complaint   Patient presents with    Procedure     Excision x 1 - BCC mid upper back      He requests these members of his care team be copied on today's visit information:     PCP: Federico Salas    Referring Provider:  J CARLOS Ramsey CNP  [address]  [place] [id number]    /79   Pulse 74     Do you need any medication refills at today's visit?     Lauren Rios on 4/23/2024 at 8:59 AM

## 2024-04-23 NOTE — LETTER
4/23/2024         RE: Micky Topete  [address] [address]        Dear Colleague,    Thank you for referring your patient, Micky Topete, to the [facility]. Please see a copy of my visit note below.    DERMATOLOGY EXCISION PROCEDURE NOTE    Dermatology Problem List:     NMSC  - sBCC, mid upper back, s/p shave 2/29/24, s/p exc 4/23/24  - BCC, below left knee, s/p punch 8/22/23, s/p exc 9/21/23  - BCC, right lower leg below knee, s/p MMS 3/15/22  - BCC, right cheek, s/p MMS 10/11/21  - BCC, left leg, s/p MMS 10/11/21  - BCC, left elbow, s/p exc 5/28/21    2. 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 Excision was indicated. We discussed the principles of treatment and most likely complications including scarring, bleeding, infection, incomplete excision, wound dehiscence, pain, nerve damage, and recurrence. Informed consent was obtained and the patient underwent the procedure as follows:    PROCEDURE: The patient was taken to the operative suite. Time-out was performed.  The treatment area was anesthetized with 1% lidocaine with epinephrine. The area was prepped with Chlorhexidine and rinsed with sterile saline and draped with sterile towels. The lesion was delineated and excised down to subcutaneous fat in a elliptical manner. Hemostasis was obtained by electrocoagulation.     REPAIR: An  intermediate layered linear closure was selected as the procedure which would maximally preserve both function and cosmesis.    After the excision of the tumor, the area was carefully undermined. Hemostasis was obtained with electrocoagulation.  Closure was oriented so that the wound was in the patient's natural skin tension lines. The subcutaneous and dermal layers were then closed with 4-0 P2 monocryl sutures. The epidermis was then carefully approximated along the length of the wound using 4.0 monocryl  running subcuticular sutures.     Estimated blood loss was less than 10 ml for all surgical sites. A sterile pressure dressing was applied and wound care instructions, with a written handout, were given. The patient was [facility] alert and ambulatory.    The patient elected for pathology results to automatically release and understands that the clinical staff will contact them as soon as possible to notify them of the results.        Dr. Kenny Eagle was immediately available for the entire surgery and was physicially present for the key portions of the procedure.    Anatomic Pathology Results: pending    Clinical Follow-Up: PRN      Staff Involved:   Scribe/Staff    Scribe Disclosure:   I, AMARI LI, am serving as a scribe; to document services personally performed by Kenny Eagle MD -based on data collection and the provider's statements to me.     Attending attestation:  I personally performed the entire procedure.  I have reviewed the note and edited it as necessary, and agree with its contents.    Kenny Eagle M.D.  Professor  Director of Dermatologic Surgery  Department of Dermatology  [facility]    Dermatology Surgery Clinic  [facility] [place] [address]          Again, thank you for allowing me to participate in the care of your patient.        Sincerely,        Kenny Eagle MD

## 2024-04-23 NOTE — PROGRESS NOTES
DERMATOLOGY EXCISION PROCEDURE NOTE    Dermatology Problem List:     NMSC  - sBCC, mid upper back, s/p shave 2/29/24, s/p exc 4/23/24  - BCC, below left knee, s/p punch 8/22/23, s/p exc 9/21/23  - BCC, right lower leg below knee, s/p MMS 3/15/22  - BCC, right cheek, s/p MMS 10/11/21  - BCC, left leg, s/p MMS 10/11/21  - BCC, left elbow, s/p exc 5/28/21    2. 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 Excision was indicated. We discussed the principles of treatment and most likely complications including scarring, bleeding, infection, incomplete excision, wound dehiscence, pain, nerve damage, and recurrence. Informed consent was obtained and the patient underwent the procedure as follows:    PROCEDURE: The patient was taken to the operative suite. Time-out was performed.  The treatment area was anesthetized with 1% lidocaine with epinephrine. The area was prepped with Chlorhexidine and rinsed with sterile saline and draped with sterile towels. The lesion was delineated and excised down to subcutaneous fat in a elliptical manner. Hemostasis was obtained by electrocoagulation.     REPAIR: An intermediate layered linear closure was selected as the procedure which would maximally preserve both function and cosmesis.    After the excision of the tumor, the area was carefully undermined. Hemostasis was obtained with electrocoagulation.  Closure was oriented  so that the wound was in the patient's natural skin tension lines. The subcutaneous and dermal layers were then closed with 4-0 P2 monocryl sutures. The epidermis was then carefully approximated along the length of the wound using 4.0 monocryl  running subcuticular sutures.     Estimated blood loss was less than 10 ml for all surgical sites. A sterile pressure dressing was applied and wound care instructions, with a written handout, were given. The patient was discharged from the [facility] alert and ambulatory.    The patient elected for pathology results to automatically release and understands that the clinical staff will contact them as soon as possible to notify them of the results.        Dr. Kenny Eagle was immediately available for the entire surgery and was physicially present for the key portions of the procedure.    Anatomic Pathology Results: pending    Clinical Follow-Up: PRN      Staff Involved:   Scribe/Staff    Scribe Disclosure:   I, AMARI LI, am serving as a scribe; to document services personally performed by Kenny Eagle MD -based on data collection and the provider's statements to me.     Attending attestation:  I personally performed the entire procedure.  I have reviewed the note and edited it as necessary, and agree with its contents.    Kenny Eagle M.D.  Professor  Director of Dermatologic Surgery  Department of Dermatology  [facility]    Dermatology Surgery Clinic  [facility] [address] [address]

## 2024-04-23 NOTE — PATIENT INSTRUCTIONS
Caring for your skin after surgery    After your surgery, a pressure bandage will be placed over the area. This will prevent bleeding. Please follow these instructions over the next 1 to 2 weeks. Following this regimen will help to prevent complications as your wound heals.     For the first 48 hours after your surgery:    Leave the pressure dressing on and keep it dry. If it should come loose, you may re-tape it, but do not take it off.  Relax and take it easy. Do not do any vigorous exercise, heavy lifting or bending forward. This could cause the wound to bleed.  Post-operative pain is usually mild. You may alternate between 1000 mg of Tylenol (acetaminophen) and 400 mg of Ibuprofen every 4 hours.  Do not take more than 4,000 mg of acetaminophen in a 24-hour period or 3200 mg of Ibuprofen in a 24-hr period.  Avoid alcohol and vitamin E as these may increase your tendency to bleed.  You may put an ice pack around the bandaged area for 20 minutes at a time as needed. This may help reduce swelling, bruising, and pain. Make sure the ice pack is waterproof so that the pressure bandage doesn't get wet.  You may see a small amount of drainage or blood on your pressure bandage. This is normal. However:  If drainage or bleeding continues or saturates the bandage, you will need to apply firm pressure over the bandage with a clean washcloth for 15 minutes.  If bleeding continues after applying pressure for 15 minutes, apply an ice pack with gentle pressure to the bandaged area for another 15 minutes.  If bleeding still continues, call our office or go to the nearest emergency room.    48 Hours After Surgery:    Remove outer white bandage down to clear plastic film (Tegaderm).  You may notice dark brown, dried blood under the Tegaderm.  This is normal.    Leave the clear plastic film (Tegaderm) on for up to 2 weeks, as long as it is intact.  If it falls off prior to 2 weeks follow daily wound care below.  If it stays intact  for the full 2 weeks, then remove and treat as normal, healthy skin.    Daily Wound Care (if Tegaderm and Steri-Strips fall off prior to 2 weeks):    Wash wound with a mild soap and water.  Use caution when washing the wound, be gentle and do not let the forceful shower stream hit the wound directly. DO NOT WASH WITH HYDROGEN PEROXIDE AS THIS MIGHT CAUSE THE STITCHES TO DISSOLVE FASTER THAN WHAT WE WANT.    Pat dry.    Apply Vaseline (from a new container or tube) over the suture line with a Q-tip until it is completely healed. It is very important to keep the wound continuously moist, as wounds heal best in a moist environment.    Keep the site covered until it's healed.  You can cover it with a Telfa (non-stick) dressing and tape or a band-aid until healed with normal, healthy skin.      Call Us If:    You have bleeding that will not stop after applying pressure and ice.  You have pain that is not controlled with Tylenol and Ibuprofen.  You have signs or symptoms of an infection such as fever over 100 degrees Fahrenheit, redness, warmth or foul-smelling drainage from the wound    [facility]: [phone]   [facility]: [phone]  For urgent needs outside of business hours call the [facility] at [phone] and ask to speak with the dermatology resident on call

## 2024-04-23 NOTE — NURSING NOTE
The following medication was given:     MEDICATION:  Lidocaine with epinephrine 1% 1:[id number]  ROUTE: SQ  SITE: see procedure note  DOSE: 8ml  LOT #: [id number]  : [employer]  EXPIRATION DATE: 01/31/2025  NDC#: 60053-067-72  Was there drug waste? Yes  Multi-dose vial: Yes      Mastisol, Steri-Strips, Tegaderm and pressure dressing applied to excision site on mid upper back.  Wound care instructions reviewed with patient and AVS provided.  Patient verbalized understanding.  Patient will follow up for suture removal: N/A.  No further questions or concerns at this time.     Lauren Rios  April 23, 2024

## 2024-04-25 LAB
PATH REPORT.COMMENTS IMP SPEC: NORMAL
PATH REPORT.COMMENTS IMP SPEC: NORMAL
PATH REPORT.FINAL DX SPEC: NORMAL
PATH REPORT.GROSS SPEC: NORMAL
PATH REPORT.MICROSCOPIC SPEC OTHER STN: NORMAL
PATH REPORT.RELEVANT HX SPEC: NORMAL

## 2024-05-01 ENCOUNTER — OFFICE VISIT (OUTPATIENT)
Dept: OPHTHALMOLOGY | Facility: CLINIC | Age: 69
End: 2024-05-01
Payer: COMMERCIAL

## 2024-05-01 DIAGNOSIS — H40.002 GLAUCOMA SUSPECT OF LEFT EYE: ICD-10-CM

## 2024-05-01 PROCEDURE — 92012 INTRM OPH EXAM EST PATIENT: CPT | Performed by: OPHTHALMOLOGY

## 2024-05-01 PROCEDURE — 92083 EXTENDED VISUAL FIELD XM: CPT | Performed by: OPHTHALMOLOGY

## 2024-05-01 PROCEDURE — 92133 CPTRZD OPH DX IMG PST SGM ON: CPT | Performed by: OPHTHALMOLOGY

## 2024-05-01 RX ORDER — TIMOLOL MALEATE 5 MG/ML
1 SOLUTION/ DROPS OPHTHALMIC DAILY
Qty: 15 ML | Refills: 4 | Status: SHIPPED | OUTPATIENT
Start: 2024-05-01

## 2024-05-01 ASSESSMENT — EXTERNAL EXAM - LEFT EYE: OS_EXAM: NORMAL

## 2024-05-01 ASSESSMENT — REFRACTION_WEARINGRX
OD_AXIS: 167
OD_CYLINDER: +0.50
OS_CYLINDER: +0.50
OD_SPHERE: -2.50
OS_ADD: +2.50
OD_ADD: +2.50
SPECS_TYPE: PAL
OS_SPHERE: -2.75
OS_AXIS: 139

## 2024-05-01 ASSESSMENT — SLIT LAMP EXAM - LIDS: COMMENTS: NORMAL

## 2024-05-01 ASSESSMENT — VISUAL ACUITY
METHOD: SNELLEN - LINEAR
OS_CC: 20/20
OS_CC+: -2
CORRECTION_TYPE: GLASSES
OD_CC: 20/30

## 2024-05-01 ASSESSMENT — TONOMETRY
OD_IOP_MMHG: 19
OS_IOP_MMHG: 21
IOP_METHOD: APPLANATION

## 2024-05-01 ASSESSMENT — EXTERNAL EXAM - RIGHT EYE: OD_EXAM: NORMAL

## 2024-05-01 NOTE — PROGRESS NOTES
" Current Eye Medications:  latanoprost - left eye only hs - last dose: 10pm  Artificial tears - both eyes PRN     Subjective: HVF, OCT and IOP - overall has not noticed a sig change in vision. Found bottle of latanoprost, using that until gone, will restart timolol after done.      Objective:  See Ophthalmology Exam.       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      Plan:   Continue:   Latanoprost (green top) every evening left eye until the bottle runs out, then start Timolol (yellow top) every morning in the left eye.    May use artificial tears up to four times a day (like Refresh Optive, Systane Balance, or TheraTears. Avoid \"get the red out\" drops and generic artifical tears).     Wait at least 5 minutes between drops if using more than one at a time.     Return visit in 6 months for a complete exam.     Jerardo Orta M.D.  [phone]       "

## 2024-05-01 NOTE — PATIENT INSTRUCTIONS
"Continue:   Latanoprost (green top) every evening left eye until the bottle runs out, then start Timolol (yellow top) every morning in the left eye.    May use artificial tears up to four times a day (like Refresh Optive, Systane Balance, or TheraTears. Avoid \"get the red out\" drops and generic artifical tears).     Wait at least 5 minutes between drops if using more than one at a time.     Return visit in 6 months for a complete exam.     Jerardo Orta M.D.  [phone]    "

## 2024-05-01 NOTE — LETTER
"    5/1/2024         RE: Micky Topete  [address] [facility] [address]        Dear Colleague,    Thank you for referring your patient, Micky Topete, to the [facility]. Please see a copy of my visit note below.     Current Eye Medications:  latanoprost - left eye only hs - last dose: 10pm  Artificial tears - both eyes PRN     Subjective: HVF, OCT and IOP - overall has not noticed a sig change in vision. Found bottle of latanoprost, using that until gone, will restart timolol after done.      Objective:  See Ophthalmology Exam.       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      Plan:   Continue:   Latanoprost (green top) every evening left eye until the bottle runs out, then start Timolol (yellow top) every morning in the left eye.    May use artificial tears up to four times a day (like Refresh Optive, Systane Balance, or TheraTears. Avoid \"get the red out\" drops and generic artifical tears).     Wait at least 5 minutes between drops if using more than one at a time.     Return visit in 6 months for a complete exam.     Jerardo Orta M.D.  [phone]           Again, thank you for allowing me to participate in the care of your patient.        Sincerely,        Jerardo Orta MD  "

## 2024-05-12 ASSESSMENT — PACHYMETRY
OD_CT(UM): .557
OS_CT(UM): .550

## 2024-07-23 ENCOUNTER — PATIENT OUTREACH (OUTPATIENT)
Dept: CARE COORDINATION | Facility: CLINIC | Age: 69
End: 2024-07-23
Payer: COMMERCIAL

## 2024-08-23 SDOH — HEALTH STABILITY: PHYSICAL HEALTH: ON AVERAGE, HOW MANY DAYS PER WEEK DO YOU ENGAGE IN MODERATE TO STRENUOUS EXERCISE (LIKE A BRISK WALK)?: 2 DAYS

## 2024-08-23 SDOH — HEALTH STABILITY: PHYSICAL HEALTH: ON AVERAGE, HOW MANY MINUTES DO YOU ENGAGE IN EXERCISE AT THIS LEVEL?: 20 MIN

## 2024-08-23 ASSESSMENT — SOCIAL DETERMINANTS OF HEALTH (SDOH): HOW OFTEN DO YOU GET TOGETHER WITH FRIENDS OR RELATIVES?: THREE TIMES A WEEK

## 2024-08-26 ENCOUNTER — OFFICE VISIT (OUTPATIENT)
Dept: FAMILY MEDICINE | Facility: CLINIC | Age: 69
End: 2024-08-26
Payer: COMMERCIAL

## 2024-08-26 VITALS
TEMPERATURE: 98.2 F | RESPIRATION RATE: 20 BRPM | BODY MASS INDEX: 24.65 KG/M2 | DIASTOLIC BLOOD PRESSURE: 78 MMHG | OXYGEN SATURATION: 98 % | SYSTOLIC BLOOD PRESSURE: 122 MMHG | WEIGHT: 182 LBS | HEIGHT: 72 IN | HEART RATE: 64 BPM

## 2024-08-26 DIAGNOSIS — M77.12 LATERAL EPICONDYLITIS OF LEFT ELBOW: ICD-10-CM

## 2024-08-26 DIAGNOSIS — N52.9 ERECTILE DYSFUNCTION, UNSPECIFIED ERECTILE DYSFUNCTION TYPE: ICD-10-CM

## 2024-08-26 DIAGNOSIS — Z80.42 FH: PROSTATE CANCER: ICD-10-CM

## 2024-08-26 DIAGNOSIS — Z00.00 ENCOUNTER FOR MEDICARE ANNUAL WELLNESS EXAM: Primary | ICD-10-CM

## 2024-08-26 DIAGNOSIS — Z12.5 SCREENING FOR PROSTATE CANCER: ICD-10-CM

## 2024-08-26 DIAGNOSIS — L98.9 SKIN LESION: ICD-10-CM

## 2024-08-26 DIAGNOSIS — E78.5 HYPERLIPIDEMIA LDL GOAL <100: ICD-10-CM

## 2024-08-26 DIAGNOSIS — L57.0 ACTINIC KERATOSES: ICD-10-CM

## 2024-08-26 DIAGNOSIS — Z85.828 HISTORY OF SKIN CANCER: ICD-10-CM

## 2024-08-26 DIAGNOSIS — B00.1 HERPES LABIALIS: ICD-10-CM

## 2024-08-26 LAB
CHOLEST SERPL-MCNC: 189 MG/DL
ERYTHROCYTE [DISTWIDTH] IN BLOOD BY AUTOMATED COUNT: 12.1 % (ref 10–15)
FASTING STATUS PATIENT QL REPORTED: YES
FASTING STATUS PATIENT QL REPORTED: YES
GLUCOSE SERPL-MCNC: 96 MG/DL (ref 70–99)
HCT VFR BLD AUTO: 42.2 % (ref 40–53)
HDLC SERPL-MCNC: 44 MG/DL
HGB BLD-MCNC: 14.1 G/DL (ref 13.3–17.7)
LDLC SERPL CALC-MCNC: 105 MG/DL
MCH RBC QN AUTO: 30.1 PG (ref 26.5–33)
MCHC RBC AUTO-ENTMCNC: 33.4 G/DL (ref 31.5–36.5)
MCV RBC AUTO: 90 FL (ref 78–100)
NONHDLC SERPL-MCNC: 145 MG/DL
PLATELET # BLD AUTO: 193 10E3/UL (ref 150–450)
PSA SERPL DL<=0.01 NG/ML-MCNC: 1.09 NG/ML (ref 0–4.5)
RBC # BLD AUTO: 4.69 10E6/UL (ref 4.4–5.9)
TRIGL SERPL-MCNC: 200 MG/DL
WBC # BLD AUTO: 6.2 10E3/UL (ref 4–11)

## 2024-08-26 PROCEDURE — 80061 LIPID PANEL: CPT | Performed by: FAMILY MEDICINE

## 2024-08-26 PROCEDURE — 99213 OFFICE O/P EST LOW 20 MIN: CPT | Mod: 25 | Performed by: FAMILY MEDICINE

## 2024-08-26 PROCEDURE — 85027 COMPLETE CBC AUTOMATED: CPT | Performed by: FAMILY MEDICINE

## 2024-08-26 PROCEDURE — G0439 PPPS, SUBSEQ VISIT: HCPCS | Performed by: FAMILY MEDICINE

## 2024-08-26 PROCEDURE — G0103 PSA SCREENING: HCPCS | Performed by: FAMILY MEDICINE

## 2024-08-26 PROCEDURE — 82947 ASSAY GLUCOSE BLOOD QUANT: CPT | Performed by: FAMILY MEDICINE

## 2024-08-26 PROCEDURE — 36415 COLL VENOUS BLD VENIPUNCTURE: CPT | Performed by: FAMILY MEDICINE

## 2024-08-26 RX ORDER — VALACYCLOVIR HYDROCHLORIDE 1 G/1
TABLET, FILM COATED ORAL
Qty: 16 TABLET | Refills: 5 | Status: SHIPPED | OUTPATIENT
Start: 2024-08-26

## 2024-08-26 RX ORDER — SILDENAFIL CITRATE 20 MG/1
TABLET ORAL
Qty: 20 TABLET | Refills: 5 | Status: SHIPPED | OUTPATIENT
Start: 2024-08-26

## 2024-08-26 ASSESSMENT — PAIN SCALES - GENERAL: PAINLEVEL: NO PAIN (0)

## 2024-08-26 NOTE — PROGRESS NOTES
Preventive Care Visit  [facility]  Federico Salas MD, Family Medicine  Aug 26, 2024      Assessment & Plan       ICD-10-CM    1. Encounter for Medicare annual wellness exam  Z00.00 CBC with platelets     CBC with platelets      2. Lateral epicondylitis of left elbow  M77.12       3. Skin lesion  L98.9       4. History of skin cancer  Z85.828       5. Actinic keratoses  L57.0       6. Screening for prostate cancer  Z12.5 Prostate Specific Antigen Screen     Prostate Specific Antigen Screen      7. FH: prostate cancer  Z80.42       8. Hyperlipidemia LDL goal <100  E78.5 Lipid panel reflex to direct LDL Fasting     Glucose     Lipid panel reflex to direct LDL Fasting     Glucose      9. Erectile dysfunction, unspecified erectile dysfunction type  N52.9 sildenafil (REVATIO) 20 MG tablet      10. Herpes labialis  B00.1 valACYclovir (VALTREX) 1000 mg tablet        Blood pressure and other vital signs look good  We discussed the above items  We will check some fasting lab work today as above  I reviewed conservative care for the left tennis elbow including ice, stretching, topical anti-inflammatory diclofenac gel, tennis elbow brace, etc.  Discussed his various skin issues and I suggested he see dermatology sooner than planned if the left temple skin lesion does not heal up (or he could see me for that and I could biopsy it)  Continue same baseline meds as needed/desired  Plan a tentative recheck in 1 year, or sooner prn      Counseling  Appropriate preventive services were addressed with this patient via screening, questionnaire, or discussion as appropriate for fall prevention, nutrition, physical activity, Tobacco-use cessation, social engagement, weight loss and cognition.  Checklist reviewing preventive services available has been given to the patient.  Reviewed patient's diet, addressing concerns and/or questions.   He is at risk for lack of exercise and has been provided with information to  increase physical activity for the benefit of his well-being.   Information on urinary incontinence and treatment options given to patient.         Subjective   Boyd is a 69 year old, presenting for the following:  Physical    Leg wound and derm issue on his forehead.      8/26/2024     8:03 AM   Additional Questions   Roomed by cam   Accompanied by none         8/26/2024     8:03 AM   Patient Reported Additional Medications   Patient reports taking the following new medications none        Health Care Directive  Patient does not have a Health Care Directive or Living Will: Advance Directive received and scanned. Click on Code in the patient header to view.    HPI    He has had numerous skin cancers in the past.  He has a spot on his left temple that was previously frozen by dermatology.  It scabbed up and got enlarged, but has been irritated since.  When he wears a baseball cap it rubs on that area.  It recently enlarged a bit again and then the scab fell off again.  He has had some mild injuries to his left lower leg and it seems like those spots are slow to heal.  He is due to see dermatology again in about 6 months.    He has had some left elbow pain.  Mainly in the last month or 2.  No known overuse.      8/23/2024   General Health   How would you rate your overall physical health? Good   Feel stress (tense, anxious, or unable to sleep) Not at all            8/23/2024   Nutrition   Diet: Regular (no restrictions)            8/23/2024   Exercise   Days per week of moderate/strenous exercise 2 days   Average minutes spent exercising at this level 20 min      (!) EXERCISE CONCERN      8/23/2024   Social Factors   Frequency of gathering with friends or relatives Three times a week   Worry food won't last until get money to buy more No   Food not last or not have enough money for food? No   Do you have housing? (Housing is defined as stable permanent housing and does not include staying ouside in a car, in a tent, in  an abandoned building, in an overnight shelter, or couch-surfing.) Yes   Are you worried about losing your housing? No   Lack of transportation? No   Unable to get utilities (heat,electricity)? No            8/23/2024   Fall Risk   Fallen 2 or more times in the past year? No    No   Trouble with walking or balance? No    No       Multiple values from one day are sorted in reverse-chronological order          8/23/2024   Activities of Daily Living- Home Safety   Needs help with the following daily activites None of the above   Safety concerns in the home None of the above            8/23/2024   Dental   Dentist two times every year? Yes            8/23/2024   Hearing Screening   Hearing concerns? None of the above            8/23/2024   Driving Risk Screening   Patient/family members have concerns about driving No            8/23/2024   General Alertness/Fatigue Screening   Have you been more tired than usual lately? No            8/23/2024   Urinary Incontinence Screening   Bothered by leaking urine in past 6 months Yes               Today's PHQ-2 Score:       8/26/2024     7:53 AM   PHQ-2 ( 1999 [employer])   Q1: Little interest or pleasure in doing things 0   Q2: Feeling down, depressed or hopeless 0   PHQ-2 Score 0   Q1: Little interest or pleasure in doing things Not at all   Q2: Feeling down, depressed or hopeless Not at all   PHQ-2 Score 0           8/23/2024   Substance Use   Alcohol more than 3/day or more than 7/wk No   Do you have a current opioid prescription? No   How severe/bad is pain from 1 to 10? 1/10   Do you use any other substances recreationally? No        Social History     Tobacco Use    Smoking status: Never     Passive exposure: Never    Smokeless tobacco: Never   Vaping Use    Vaping status: Never Used   Substance Use Topics    Alcohol use: Yes    Drug use: No           8/23/2024   AAA Screening   Family history of Abdominal Aortic Aneurysm (AAA)? No      Last PSA:   PSA   Date Value Ref Range  Status   04/23/2019 3.48 0 - 4 ug/L Final     Comment:     Assay Method:  Chemiluminescence using Siemens Vista analyzer     Prostate Specific Antigen Screen   Date Value Ref Range Status   07/21/2022 1.02 0.00 - 4.00 ug/L Final     ASCVD Risk   The 10-year ASCVD risk score (Torri MORLEY, et al., 2019) is: 13.5%    Values used to calculate the score:      Age: 69 years      Sex: Male      Is Non- : No      Diabetic: No      Tobacco smoker: No      Systolic Blood Pressure: 122 mmHg      Is BP treated: No      HDL Cholesterol: 47 mg/dL      Total Cholesterol: 165 mg/dL          Reviewed and updated as needed this visit by Provider                    Patient Active Problem List   Diagnosis    Hyperlipidemia LDL goal <100    History of basal cell cancer    Cataract of both eyes    Actinic keratoses    Erectile dysfunction, unspecified erectile dysfunction type    Right-sided low back pain without sciatica, unspecified chronicity    Posterior vitreous detachment of both eyes    Glaucoma suspect of left eye - treated    Pseudoexfoliation (PXF) of left lens capsule    Umbilical hernia without obstruction and without gangrene    FH: prostate cancer     Past Surgical History:   Procedure Laterality Date    APPENDECTOMY      BIOPSY      Left forearm, basal cell    COLONOSCOPY      Age 51    HEAD & NECK SURGERY      5th & 7th cervical disk bulge    SOFT TISSUE SURGERY      Rt shoulder bursitis, ligament stretch    VASECTOMY         Social History     Tobacco Use    Smoking status: Never     Passive exposure: Never    Smokeless tobacco: Never   Substance Use Topics    Alcohol use: Yes     Family History   Problem Relation Age of Onset    Diabetes Mother         Started early 60's    Eye Disorder Mother     Glaucoma Mother     Macular Degeneration Mother     Cerebrovascular Disease Mother     Skin Cancer Mother     Hypertension Father         Late onset after custodial    Heart Disease Father      Cancer - colorectal Paternal Grandfather     Glaucoma Sister     Macular Degeneration Sister          Current Outpatient Medications   Medication Sig Dispense Refill    diclofenac (VOLTAREN) 1 % topical gel Apply topically daily as needed for moderate pain.      sildenafil (REVATIO) 20 MG tablet TAKE 1-4 TABLETS BY MOUTH AS NEEDED PRIOR TO INTERCOURSE. (DO NOT TAKE WITH NITROGLYCERIN, TERAZOSIN, OR DOXAZOSIN) 20 tablet 5    timolol maleate (TIMOPTIC) 0.5 % ophthalmic solution Place 1 drop Into the left eye daily 15 mL 4    valACYclovir (VALTREX) 1000 mg tablet TAKE TWO TABLETS BY MOUTH NOW AND THEN 12 HOURS LATER AS NEEDED FOR COLD SORES (Patient not taking: Reported on 8/22/2023) 16 tablet 5     Allergies   Allergen Reactions    Sulfa Antibiotics      Current providers sharing in care for this patient include:  Patient Care Team:  Federico Slaas MD as PCP - General  Federico Salas MD as Assigned PCP  Joseph Barnard MD as MD (Dermatology)  Jerardo Orta MD as Assigned Surgical Provider    The following health maintenance items are reviewed in Epic and correct as of today:  Health Maintenance   Topic Date Due    RSV VACCINE (Pregnancy & 60+) (1 - 1-dose 60+ series) Never done    LIPID  07/21/2023    ANNUAL REVIEW OF HM ORDERS  07/21/2023    COVID-19 Vaccine (6 - 2023-24 season) 02/13/2024    MEDICARE ANNUAL WELLNESS VISIT  08/22/2024    INFLUENZA VACCINE (1) 09/01/2024    EYE EXAM  05/01/2025    GLUCOSE  07/21/2025    FALL RISK ASSESSMENT  08/26/2025    COLORECTAL CANCER SCREENING  03/31/2027    ADVANCE CARE PLANNING  08/22/2028    DTAP/TDAP/TD IMMUNIZATION (4 - Td or Tdap) 04/23/2029    HEPATITIS C SCREENING  Completed    PHQ-2 (once per calendar year)  Completed    Pneumococcal Vaccine: 65+ Years  Completed    ZOSTER IMMUNIZATION  Completed    HPV IMMUNIZATION  Aged Out    MENINGITIS IMMUNIZATION  Aged Out    RSV MONOCLONAL ANTIBODY  Aged Out         Review of Systems  His brother was recently  "diagnosed with prostate cancer at age 83.  Noncontributory except as above.     Objective    Exam  /78 (BP Location: Left arm, Patient Position: Sitting, Cuff Size: Adult Regular)   Pulse 64   Temp 98.2  F (36.8  C) (Temporal)   Resp 20   Ht 1.835 m (6' 0.24\")   Wt 82.6 kg (182 lb)   SpO2 98%   BMI 24.52 kg/m     Estimated body mass index is 24.52 kg/m  as calculated from the following:    Height as of this encounter: 1.835 m (6' 0.24\").    Weight as of this encounter: 82.6 kg (182 lb).    Physical Exam  GENERAL: alert and no distress  EYES: Eyes grossly normal to inspection, PERRL and conjunctivae and sclerae normal  HENT: ear canals and TM's normal  NECK: no adenopathy, no asymmetry, masses, or scars  RESP: lungs clear to auscultation - no rales, rhonchi or wheezes  CV: regular rate and rhythm, normal S1 S2, no S3 or S4, no murmur, click or rub, no peripheral edema  ABDOMEN: soft, nontender, no hepatosplenomegaly, no masses   MS: Tenderness to palpation over the left lateral epicondyle.  Some discomfort there with resistance to left wrist extension and middle finger extension.  SKIN: He has some mild actinic changes on the face.  A scabbed area on his left temple.  Some healing areas on his left lower leg.  Some seborrheic keratoses on his back.  No obvious skin cancers currently.  NEURO: Normal strength and tone, mentation intact and speech normal  PSYCH: mentation appears normal, affect normal/bright        8/26/2024   Mini Cog   Clock Draw Score 2 Normal   3 Item Recall 3 objects recalled   Mini Cog Total Score 5            Vision Screen  Reason Vision Screen Not Completed: Patient had exam in last 12 months      Signed Electronically by: Federico Salas MD    "

## 2024-08-26 NOTE — RESULT ENCOUNTER NOTE
Boyd,  Your blood sugar, blood counts, and PSA remain nice and normal.  Cholesterol values are still generally in good shape, but higher than a couple of years ago (as you suspected) when we last checked.  Continue efforts at healthy eating and regular exercise would be appropriate treatment for this.     Shravan

## 2024-09-30 ENCOUNTER — MYC MEDICAL ADVICE (OUTPATIENT)
Dept: DERMATOLOGY | Facility: CLINIC | Age: 69
End: 2024-09-30
Payer: COMMERCIAL

## 2024-10-03 ENCOUNTER — OFFICE VISIT (OUTPATIENT)
Dept: DERMATOLOGY | Facility: CLINIC | Age: 69
End: 2024-10-03
Payer: COMMERCIAL

## 2024-10-03 DIAGNOSIS — L57.0 AK (ACTINIC KERATOSIS): Primary | ICD-10-CM

## 2024-10-03 PROCEDURE — 17000 DESTRUCT PREMALG LESION: CPT | Mod: XS | Performed by: NURSE PRACTITIONER

## 2024-10-03 PROCEDURE — 99214 OFFICE O/P EST MOD 30 MIN: CPT | Mod: 25 | Performed by: NURSE PRACTITIONER

## 2024-10-03 PROCEDURE — 11102 TANGNTL BX SKIN SINGLE LES: CPT | Performed by: NURSE PRACTITIONER

## 2024-10-03 PROCEDURE — 88305 TISSUE EXAM BY PATHOLOGIST: CPT | Performed by: DERMATOLOGY

## 2024-10-03 RX ORDER — FLUOROURACIL 50 MG/G
CREAM TOPICAL
Qty: 40 G | Refills: 0 | Status: SHIPPED | OUTPATIENT
Start: 2024-10-03

## 2024-10-03 RX ORDER — CALCIPOTRIENE 50 UG/G
CREAM TOPICAL
Qty: 60 G | Refills: 0 | Status: SHIPPED | OUTPATIENT
Start: 2024-10-03

## 2024-10-03 NOTE — PROGRESS NOTES
[facility] Dermatology Note  Encounter Date: Oct 3, 2024  Office Visit     Reviewed patients past medical history and pertinent chart review prior to patients visit today.     Dermatology Problem List:  NUB left forehead, shave biopsy 10/03/24 .   History of BCC  BCC  On right cheek removed 10/21  BCC on left leg removed 10/21  BCC on right leg removed 3/22  BCC below left knee excised by PCP 9/21/23  BCC mid upper back, excision 4/22/24     History of actinic keratosis treated with PDT and cryo. 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 Shave biopsy today.    Procedure Note: Biopsy by shave technique  The risks and benefits of the procedure were described to the patient. These include but are not limited to bleeding, infection, scar, incomplete removal, and non-diagnostic biopsy. Verbal informed consent was obtained. The above site(s) was cleansed with an alcohol pad and injected with 1% lidocaine with epinephrine. Once anesthesia was obtained, a biopsy(ies) was performed with Gilette blade. The tissue(s) was placed in a labeled container(s) with formalin and sent to pathology. Hemostasis was achieved with aluminum chloride. Vaseline and a bandage were applied to the wound(s). The patient tolerated the procedure well and was given post biopsy care instructions.     # Actinic keratosis, vertex scalp. Premalignant nature discussed with patient. Treatment options discussed with patient today including no treatment, topical treatment, and cryotherapy. Patient elects to treat visible lesions today with cryotherapy. After verbal consent and discussion of risks and benefits including but no limited to dyspigmentation/scar, blister, and pain. A total of 1 actinic keratoses were treated with 1-2mm freeze border  for 2 cycle with liquid nitrogen. Post cryotherapy instructions were provided.     # Actinic keratosis.  Discussed treatment options with patient including cryotherapy, Efudex, and Efudex plus calcipotriene.  Patient prefers to treat the face, ears and vertex scalp with Efudex plus calcipotriene.  Start Efudex calcipotriene twice daily x5-10 days. Counseled that patient should expect erythema and scale, but should discontinue this medication if significant oozing or pain greater than 5/10 is to occur. Recommend the patient wash hands after use or use gloves to apply. Keep medication away from pets. Handout provided with administration instructions.     Follow up for skin exam in may 2025    Juana Dickson CNP  Dermatology    _______________________________________    CC: Derm Problem (Lesion to left forehead that present for 6 months without healing /Last BCC 3/2024)    HPI:  Mr. Micky Topete is a(n) 69 year old male who presents today as a return patient for spot on the left forehead, it has been growing. He thinks it may have been treated with liquid nitrogen in the past. He also has some rough spots on the face and scalp as well. He has done collin- U PDT on his face in the past and it seems to clear it up for awhile but it comes back.     Patient is otherwise feeling well, without additional skin concerns.      Physical Exam:  SKIN: Focused examination of face and scalp was performed.  - left forehead, about 1 cm papule with rolled borders and crusted center, color was purple/grey in color at edges  -diffuse pink scaly thin papules on vertex scalp, temples, ears, and infraauricular neck    - No other lesions of concern on areas examined.     Medications:  Current Outpatient Medications   Medication Sig Dispense Refill    calcipotriene (DOVONOX) 0.005 % external cream Mix with fluorouracil and apply BID to affected areas for 5-10 days until desired result is achieved. Wash hands after applying. Avoid sun 60 g 0     fluorouracil (EFUDEX) 5 % external cream Mix with calcipotriene and apply BID to affected areas for 5-10 days. 40 g 0    diclofenac (VOLTAREN) 1 % topical gel Apply topically daily as needed for moderate pain.      sildenafil (REVATIO) 20 MG tablet TAKE 1-4 TABLETS BY MOUTH AS NEEDED PRIOR TO INTERCOURSE. (DO NOT TAKE WITH NITROGLYCERIN, TERAZOSIN, OR DOXAZOSIN) 20 tablet 5    timolol maleate (TIMOPTIC) 0.5 % ophthalmic solution Place 1 drop Into the left eye daily 15 mL 4    valACYclovir (VALTREX) 1000 mg tablet TAKE TWO TABLETS BY MOUTH NOW AND THEN 12 HOURS LATER AS NEEDED FOR COLD SORES 16 tablet 5     No current facility-administered medications for this visit.      Past Medical History:   Patient Active Problem List   Diagnosis    Hyperlipidemia LDL goal <100    History of basal cell cancer    Cataract of both eyes    Actinic keratoses    Erectile dysfunction, unspecified erectile dysfunction type    Right-sided low back pain without sciatica, unspecified chronicity    Posterior vitreous detachment of both eyes    Glaucoma suspect of left eye - treated    Pseudoexfoliation (PXF) of left lens capsule    Umbilical hernia without obstruction and without gangrene    FH: prostate cancer     Past Medical History:   Diagnosis Date    Actinic keratoses     Basal cell carcinoma (BCC) of left lower extremity 09/2023    left medial calf    Basal cell carcinoma of left lower extremity 05/2019    left anterior thigh    Basal cell carcinoma of left upper arm 05/2021    just above left elbow    Basal cell carcinoma of left upper extremity 11/2009    left forearm    Basal cell carcinoma of right upper extremity 03/2020    right upper outer arm    BCC (basal cell carcinoma), face 10/2021    removed via Mohs by Dr. Barnard    Cataract 09/20/2012    Glaucoma (increased eye pressure)     Hyperlipidemia LDL goal < 130     Right-sided low back pain without sciatica, unspecified chronicity age 27       CC Federico Salas MD  [phone]  [facility] [place] [phone] on close of this encounter.

## 2024-10-03 NOTE — PATIENT INSTRUCTIONS
Efudex and calcipotriene combination Treatment  Today, you are being prescribed Fluorouracil (Efudex) a topical cream used for the treatment of Actinic Keratosis (AKs).  The medication is working to eliminate the unhealthy cells.  This treatment may be unattractive and somewhat uncomfortable.  Your treatment will last 5-10 days. You may experience some mild discomfort while being treated.  You will want to stop any other creams such as glycolic acid products, retin A, Tazorac, etc. to the area. You may use bland makeup/cover-up as long as it doesn't sting or cause you discomfort.  When using calcipotriene and Fluorouracil, apply the cream both morning and night for 5-10 days as your provider recommends. Use a Q-tip or use gloves if applying it with your fingertips. If applied with unprotected fingertips, it is important to wash your hands well after you apply this medicine. If you are using the Fluorouracil alone, apply only at night for 2-4 weeks.   Keep this medication away from pets.  We recommend avoiding excessive sun exposure to the treated area. Wear a sunscreen with SPF 50+ to the areas being treated prior to any sun exposure as you will be more sensitive to the sun and more prone to sunburn while being treated and afterwards during healing process.   You may use ointments such as vaseline or Aquaphor, or moisturizing creams such as Vanicream or Cetaphil cream over bothersome areas. If the reaction becomes itchy you may apply over the counter hydrocorisone 1% cream or ointment twice daily to itchy areas. If the reaction becomes too bothersome, please call the clinic as we may need to discontinue treatment or reevaluate in the clinic.     Potential Side Effects  Your treated areas may be unsightly during therapy.  This will improve slowly following the discontinuation of therapy.   During the beginning of treatment, mild inflammation may occur.   During the end of treatment, redness, and swelling may occur  with some crusting and burning.   Lesions resolve as the skin exfoliates.   Over 1 to 2 weeks, new skin grows into the treatment area.  Keep this medication away from pets as it can be very harmful to them.     Specific side effects that usually do not require medical attention (report to your doctor or health care professional if they continue or are bothersome) include:  Red or dark-colored skin   Mild erosion (loss of upper layer of skin)   Mild eye irritation including burning, itching, sensitivity, stinging, or watering   Increased sensitivity of the skin to sun and ultraviolet light   Pain and burning of the affected area   Dryness, scaling or swelling of the affected area   Skin rash, itching of the affected area   Tenderness   If you have severe pain, please, call the clinic immediately and indicate that you have pain.  Ask for a call from the RN.     Who should I call with questions?  Dermatology nurse line: [phone]     ---------------------------------------------------------------Wound Care After a Biopsy    What is a skin biopsy?  A skin biopsy allows the doctor to examine a very small piece of tissue under the microscope to determine the diagnosis and the best treatment for the skin condition. A local anesthetic (numbing medicine)  is injected with a very small needle into the skin area to be tested. A small piece of skin is taken from the area. Sometimes a suture (stitch) is used.     What are the risks of a skin biopsy?  I will experience scar, bleeding, swelling, pain, crusting and redness. I may experience incomplete removal or recurrence. Risks of this procedure are excessive bleeding, bruising, infection, nerve damage, numbness, thick (hypertrophic or keloidal) scar and non-diagnostic biopsy.    How should I care for my wound for the first 24 hours?  Keep the wound dry and covered for 24 hours  If it bleeds, hold direct pressure on the area for 15 minutes. If bleeding does not stop then go to  the emergency room  Avoid strenuous exercise the first 1-2 days or as your doctor instructs you    How should I care for the wound after 24 hours?  After 24 hours, remove the bandage  You may bathe or shower as normal  If you had a scalp biopsy, you can shampoo as usual and can use shower water to clean the biopsy site daily  Clean the wound twice a day with gentle soap and water  Do not scrub, be gentle  Apply white petroleum/Vaseline after cleaning the wound with a cotton swab or a clean finger, and keep the site covered with a Bandaid /bandage. Bandages are not necessary with a scalp biopsy  If you are unable to cover the site with a Bandaid /bandage, re-apply ointment 2-3 times a day to keep the site moist. Moisture will help with healing  Avoid strenuous activity for first 1-2 days  Avoid lakes, rivers, pools, and oceans until the stitches are removed or the site is healed    How do I clean my wound?  Wash hands thoroughly with soap or use hand  before all wound care  Clean the wound with gentle soap and water  Apply white petroleum/Vaseline  to wound after it is clean  Replace the Bandaid /bandage to keep the wound covered for the first few days or as instructed by your doctor  If you had a scalp biopsy, warm shower water to the area on a daily basis should suffice    What should I use to clean my wound?   Cotton-tipped applicators (Qtips )  White petroleum jelly (Vaseline ). Use a clean new container and use Q-tips to apply.  Bandaids   as needed  Gentle soap     How should I care for my wound long term?  Do not get your wound dirty  Keep up with wound care for one week or until the area is healed.  A small scab will form and fall off by itself when the area is completely healed. The area will be red and will become pink in color as it heals. Sun protection is very important for how your scar will turn out. Sunscreen with an SPF 30 or greater is recommended once the area is healed.  If you have  stitches, stitches need to be removed in 7 days on the face/neck, or 10-14 days on the body days. You may return to our clinic for this or you may have it done locally at your doctor s office.  You should have some soreness but it should be mild and slowly go away over several days. Talk to your doctor about using tylenol for pain,    When should I call my doctor?  If you have increased:   Pain or swelling  Pus or drainage (clear or slightly yellow drainage is ok)  Temperature over 100F  Spreading redness or warmth around wound    When will I hear about my results?  The biopsy results can take 2 weeks to come back.  Your results will automatically release to [employer] before your provider has even reviewed them.  The clinic will call you with the results, send you a [employer] message, or have you schedule a follow-up clinic or phone time to discuss the results.  Contact our clinics if you do not hear from us in 2 weeks.    Who should I call with questions?  [facility]: [phone]  [facility]: [phone]  For urgent needs outside of business hours call the [facility] at [phone] and ask for the dermatology resident on call

## 2024-10-03 NOTE — LETTER
10/3/2024      Micky Topete  [address] [address]      Dear Colleague,    Thank you for referring your patient, Micky Topete, to the [facility]. Please see a copy of my visit note below.    [facility] Dermatology Note  Encounter Date: Oct 3, 2024  Office Visit     Reviewed patients past medical history and pertinent chart review prior to patients visit today.     Dermatology Problem List:  NUB left forehead, shave biopsy 10/03/24 .   History of BCC  BCC  On right cheek removed 10/21  BCC on left leg removed 10/21  BCC on right leg removed 3/22  BCC below left knee excised by PCP 9/21/23  BCC mid upper back, excision 4/22/24     History of actinic keratosis treated with PDT and cryo. 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 Shave biopsy today.    Procedure Note: Biopsy by shave technique  The risks and benefits of the procedure were described to the patient. These include but are not limited to bleeding, infection, scar, incomplete removal, and non-diagnostic biopsy. Verbal informed consent was obtained. The above site(s) was cleansed with an alcohol pad and injected with 1% lidocaine with epinephrine. Once anesthesia was obtained, a biopsy(ies) was performed with Gilette blade. The tissue(s) was placed in a labeled container(s) with formalin and sent to pathology. Hemostasis was achieved with aluminum chloride. Vaseline and a bandage were applied to the wound(s). The patient tolerated the procedure well and was given post biopsy care instructions.     # Actinic keratosis, vertex scalp. Premalignant nature discussed with patient. Treatment options discussed with patient today including no treatment, topical treatment, and cryotherapy. Patient elects  to treat visible lesions today with cryotherapy. After verbal consent and discussion of risks and benefits including but no limited to dyspigmentation/scar, blister, and pain. A total of 1 actinic keratoses were treated with 1-2mm freeze border for 2 cycle with liquid nitrogen. Post cryotherapy instructions were provided.     # Actinic keratosis.  Discussed treatment options with patient including cryotherapy, Efudex, and Efudex plus calcipotriene.  Patient prefers to treat the face, ears and vertex scalp with Efudex plus calcipotriene.  Start Efudex calcipotriene twice daily x5-10 days. Counseled that patient should expect erythema and scale, but should discontinue this medication if significant oozing or pain greater than 5/10 is to occur. Recommend the patient wash hands after use or use gloves to apply. Keep medication away from pets. Handout provided with administration instructions.     Follow up for skin exam in may 2025    Juana Dickson CNP  Dermatology    _______________________________________    CC: Derm Problem (Lesion to left forehead that present for 6 months without healing /Last BCC 3/2024)    HPI:  Mr. Micky Topete is a(n) 69 year old male who presents today as a return patient for spot on the left forehead, it has been growing. He thinks it may have been treated with liquid nitrogen in the past. He also has some rough spots on the face and scalp as well. He has done collin- U PDT on his face in the past and it seems to clear it up for awhile but it comes back.     Patient is otherwise feeling well, without additional skin concerns.      Physical Exam:  SKIN: Focused examination of face and scalp was performed.  - left forehead, about 1 cm papule with rolled borders and crusted center, color was purple/grey in color at edges  -diffuse pink scaly thin papules on vertex scalp, temples, ears, and infraauricular neck    - No other lesions of concern on areas examined.     Medications:  Current Outpatient  Medications   Medication Sig Dispense Refill     calcipotriene (DOVONOX) 0.005 % external cream Mix with fluorouracil and apply BID to affected areas for 5-10 days until desired result is achieved. Wash hands after applying. Avoid sun 60 g 0     fluorouracil (EFUDEX) 5 % external cream Mix with calcipotriene and apply BID to affected areas for 5-10 days. 40 g 0     diclofenac (VOLTAREN) 1 % topical gel Apply topically daily as needed for moderate pain.       sildenafil (REVATIO) 20 MG tablet TAKE 1-4 TABLETS BY MOUTH AS NEEDED PRIOR TO INTERCOURSE. (DO NOT TAKE WITH NITROGLYCERIN, TERAZOSIN, OR DOXAZOSIN) 20 tablet 5     timolol maleate (TIMOPTIC) 0.5 % ophthalmic solution Place 1 drop Into the left eye daily 15 mL 4     valACYclovir (VALTREX) 1000 mg tablet TAKE TWO TABLETS BY MOUTH NOW AND THEN 12 HOURS LATER AS NEEDED FOR COLD SORES 16 tablet 5     No current facility-administered medications for this visit.      Past Medical History:   Patient Active Problem List   Diagnosis     Hyperlipidemia LDL goal <100     History of basal cell cancer     Cataract of both eyes     Actinic keratoses     Erectile dysfunction, unspecified erectile dysfunction type     Right-sided low back pain without sciatica, unspecified chronicity     Posterior vitreous detachment of both eyes     Glaucoma suspect of left eye - treated     Pseudoexfoliation (PXF) of left lens capsule     Umbilical hernia without obstruction and without gangrene     FH: prostate cancer     Past Medical History:   Diagnosis Date     Actinic keratoses      Basal cell carcinoma (BCC) of left lower extremity 09/2023    left medial calf     Basal cell carcinoma of left lower extremity 05/2019    left anterior thigh     Basal cell carcinoma of left upper arm 05/2021    just above left elbow     Basal cell carcinoma of left upper extremity 11/2009    left forearm     Basal cell carcinoma of right upper extremity 03/2020    right upper outer arm     BCC (basal cell  carcinoma), face 10/2021    removed via Mohs by Dr. Barnard     Cataract 09/20/2012     Glaucoma (increased eye pressure)      Hyperlipidemia LDL goal < 130      Right-sided low back pain without sciatica, unspecified chronicity age 27       CC Federico Salas MD  [address]  [place] [id number] on close of this encounter.       Again, thank you for allowing me to participate in the care of your patient.        Sincerely,        J CARLOS Henry CNP

## 2024-10-08 ENCOUNTER — TELEPHONE (OUTPATIENT)
Dept: DERMATOLOGY | Facility: CLINIC | Age: 69
End: 2024-10-08
Payer: COMMERCIAL

## 2024-10-08 DIAGNOSIS — C44.92 SCC (SQUAMOUS CELL CARCINOMA): Primary | ICD-10-CM

## 2024-10-08 NOTE — TELEPHONE ENCOUNTER
Patient viewed results on [employer]: Seen by patient Boyd Topete on 10/8/2024  7:46 AM   Patient has no questions at the time of call. Has had mohs before and had no questions about results or procedure.     Appointment scheduled on Tuesday October 29th, 2024 at 7:45 AM at [facility]  Patient expressed understanding.     MOHS information mailed & sent via [employer].   Referral placed & linked.     Zandra PEREZ RN BSN  [facility]  [phone]

## 2024-10-08 NOTE — TELEPHONE ENCOUNTER
----- Message from Claudia Dickson sent at 10/8/2024  7:11 AM CDT -----  Please schedule patient for Mohs or place a dermatology surgery referral      Hi Boyd,     We got your biopsy results back regarding the left forhead. It was in fact a squamous cell skin cancer. Squamous Cell skin cancer is usually caused by years of sun exposure. This skin cancer typically is well contained within the skin and rarely spreads elsewhere in the body. If left untreated it will continue to grow over time and may cause issues with deformity or spreading, therefore complete removal is recommended. I will refer you to our surgeons for a procedure called Mohs surgery. One of their nurses will reach out to you to schedule this shortly and review the process of Mohs surgery. Let me know if you have any other questions.     Juana Dickson CNP   Written by J CARLOS Ramsey CNP on 10/8/2024  7:11 AM CDT

## 2024-10-08 NOTE — LETTER
[facility]  [address] [place] [address]  [phone]      October 8, 2024    Micky Topete  [address] [place] [id number]      Dear Micky      You are scheduled for Mohs Surgery on Tuesday October 29th, 2024 at 7:45 AM    Please check in at 2nd floor Dermatology Clinic.     Be sure to eat a good breakfast and bathe and wash your hair prior to Surgery. Please bring  with you if this is above your neck    If you are taking any anti-coagulants that are prescribed by your Doctor (such as Coumadin/warfarin, Plavix, Aspirin, Ibuprofen), please continue taking them.     However, If you are taking anti-coagulants over the counter without  a Doctor's order for a Medical condition, please discontinue them 10 days prior to Surgery.      Please wear loose comfortable clothing as it could possibly be 4-6 hours until your surgery is completed depending upon how many layers of tissue need to be removed.     If you need any mobility assistance (getting on the exam chair or toilet) please bring a caregiver, family member, or staff member to assist you. We are not equipped to transfer patients.    Thank you,    Joseph Barnard MD

## 2024-10-29 ENCOUNTER — OFFICE VISIT (OUTPATIENT)
Dept: DERMATOLOGY | Facility: CLINIC | Age: 69
End: 2024-10-29
Payer: COMMERCIAL

## 2024-10-29 DIAGNOSIS — C44.329 SQUAMOUS CELL CARCINOMA OF FOREHEAD: Primary | ICD-10-CM

## 2024-10-29 PROCEDURE — 13132 CMPLX RPR F/C/C/M/N/AX/G/H/F: CPT | Performed by: DERMATOLOGY

## 2024-10-29 PROCEDURE — 17311 MOHS 1 STAGE H/N/HF/G: CPT | Performed by: DERMATOLOGY

## 2024-10-29 NOTE — LETTER
10/29/2024      Micky Topete  [address] [id number]      Dear Colleague,    Thank you for referring your patient, Micky Topete, to the [facility]. Please see a copy of my visit note below.    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  Patient has no other skin complaints today.  Remainder of the HPI, Meds, PMH, Allergies, FH, and SH was reviewed in chart.      Past Medical History:   Diagnosis Date     Actinic keratoses      Basal cell carcinoma (BCC) of left lower extremity 09/2023    left medial calf     Basal cell carcinoma of left lower extremity 05/2019    left anterior thigh     Basal cell carcinoma of left upper arm 05/2021    just above left elbow     Basal cell carcinoma of left upper extremity 11/2009    left forearm     Basal cell carcinoma of right upper extremity 03/2020    right upper outer arm     BCC (basal cell carcinoma), face 10/2021    removed via Mohs by Dr. Barnard     Cataract 09/20/2012     Glaucoma (increased eye pressure)      Hyperlipidemia LDL goal < 130      Right-sided low back pain without sciatica, unspecified chronicity age 27       Past Surgical History:   Procedure Laterality Date     APPENDECTOMY       BIOPSY      Left forearm, basal cell     COLONOSCOPY      Age 51     HEAD & NECK SURGERY      5th & 7th cervical disk bulge     SOFT TISSUE SURGERY      Rt shoulder bursitis, ligament stretch     VASECTOMY          Family History   Problem Relation Age of Onset     Diabetes Mother         Started early 60's     Eye Disorder Mother      Glaucoma Mother      Macular Degeneration Mother      Cerebrovascular Disease Mother      Skin Cancer Mother      Hypertension Father         Late onset after residential     Heart  Disease Father      Glaucoma Sister      Macular Degeneration Sister      Melanoma Brother      Melanoma Brother      Cancer - colorectal Paternal Grandfather        Social History     Socioeconomic History     Marital status:      Spouse name: Odessa     Number of children: 2     Years of education: Not on file     Highest education level: Not on file   Occupational History     Employer: [employer]   Tobacco Use     Smoking status: Never     Passive exposure: Never     Smokeless tobacco: Never   Vaping Use     Vaping status: Never Used   Substance and Sexual Activity     Alcohol use: Yes     Drug use: No     Sexual activity: Yes     Partners: Female     Birth control/protection: Male Surgical     Comment: Having some ED issues   Other Topics Concern     Parent/sibling w/ CABG, MI or angioplasty before 65F 55M? No   Social History Narrative     Not on file     Social Drivers of Health     Financial Resource Strain: Low Risk  (8/23/2024)    Financial Resource Strain      Within the past 12 months, have you or your family members you live with been unable to get utilities (heat, electricity) when it was really needed?: No   Food Insecurity: Low Risk  (8/23/2024)    Food Insecurity      Within the past 12 months, did you worry that your food would run out before you got money to buy more?: No      Within the past 12 months, did the food you bought just not last and you didn t have money to get more?: No   Transportation Needs: Low Risk  (8/23/2024)    Transportation Needs      Within the past 12 months, has lack of transportation kept you from medical appointments, getting your medicines, non-medical meetings or appointments, work, or from getting things that you need?: No   Physical Activity: Insufficiently Active (8/23/2024)    Exercise Vital Sign      Days of Exercise per Week: 2 days      Minutes of Exercise per Session: 20 min   Stress: No Stress Concern Present (8/23/2024)    [place]  [employer] - Occupational Stress Questionnaire      Feeling of Stress : Not at all   Social Connections: Unknown (8/23/2024)    Social Connection and Isolation Panel [NHANES]      Frequency of Communication with Friends and Family: Not on file      Frequency of Social Gatherings with Friends and Family: Three times a week      Attends Muslim Services: Not on file      Active Member of Clubs or Organizations: Not on file      Attends Club or Organization Meetings: Not on file      Marital Status: Not on file   Interpersonal Safety: Low Risk  (8/26/2024)    Interpersonal Safety      Do you feel physically and emotionally safe where you currently live?: Yes      Within the past 12 months, have you been hit, slapped, kicked or otherwise physically hurt by someone?: No      Within the past 12 months, have you been humiliated or emotionally abused in other ways by your partner or ex-partner?: No   Housing Stability: Low Risk  (8/23/2024)    Housing Stability      Do you have housing? : Yes      Are you worried about losing your housing?: No       Outpatient Encounter Medications as of 10/29/2024   Medication Sig Dispense Refill     calcipotriene (DOVONOX) 0.005 % external cream Mix with fluorouracil and apply BID to affected areas for 5-10 days until desired result is achieved. Wash hands after applying. Avoid sun 60 g 0     diclofenac (VOLTAREN) 1 % topical gel Apply topically daily as needed for moderate pain.       fluorouracil (EFUDEX) 5 % external cream Mix with calcipotriene and apply BID to affected areas for 5-10 days. 40 g 0     sildenafil (REVATIO) 20 MG tablet TAKE 1-4 TABLETS BY MOUTH AS NEEDED PRIOR TO INTERCOURSE. (DO NOT TAKE WITH NITROGLYCERIN, TERAZOSIN, OR DOXAZOSIN) 20 tablet 5     timolol maleate (TIMOPTIC) 0.5 % ophthalmic solution Place 1 drop Into the left eye daily 15 mL 4     valACYclovir (VALTREX) 1000 mg tablet TAKE TWO TABLETS BY MOUTH NOW AND THEN 12 HOURS LATER AS  NEEDED FOR COLD SORES 16 tablet 5     No facility-administered encounter medications on file as of 10/29/2024.             O:   NAD, WDWN, Alert & Oriented, Mood & Affect wnl, Vitals stable   General appearance normal   Vitals stable   Alert, oriented and in no acute distress     L forehead 1.7cm red plaque      Eyes: Conjunctivae/lids:Normal     ENT: Lips, mucosa: normal    MSK:Normal    Cardiovascular: peripheral edema none    Pulm: Breathing Normal    Lymph Nodes: No Head and Neck Lymphadenopathy     Neuro/Psych: Orientation:Alert and Orientedx3 ; Mood/Affect:normal       A/P:  L forehead squamous cell carcinoma   MOHS:   Location    The rationale for Mohs surgery was discussed with the patient and consent was obtained.  The risks and benefits as well as alternatives to therapy were discussed, in detail.  Specifically, the risks of infection, scarring, bleeding, prolonged wound healing, incomplete removal, allergy to anesthesia, nerve injury and recurrence were addressed.  Indication for Mohs was Location. Prior to the procedure, the treatment site was clearly identified and, if available, confirmed with previous photos and confirmed by the patient   All components of the Universal Protocol/PAUSE rule were completed.  The Mohs surgeon operated in two distinct and integrated capacities as the surgeon and pathologist.      The area was prepped with Betasept.  A rim of normal appearing skin was marked circumferentially around the lesion.  The area was infiltrated with local anesthesia.  The tumor was first debulked to remove all clinically apparent tumor.  An incision following the standard Mohs approach was done and the specimen was oriented,mapped and placed in 1 block(s).  Each specimen was then chromacoded and processed in the Mohs laboratory using standard Mohs technique and submitted for frozen section histology.  Frozen section analysis showed  residual tumor but CLEAR MARGINS.    1st stage:There is a  proliferation of irregular nests of abnormal squamous cells arising from the epidermis and invading the dermis. These are well differentiated. The dermis shows a variable superficial perivascular inflammatory infiltrate.     The tumor was excised using standard Mohs technique in 1 stages(s).  CLEAR MARGINS OBTAINED and Final defect size was 2.4 x 2.3 cm.     We discussed the options for wound management in full with the patient including risks/benefits/ possible outcomes.      REPAIR COMPLEX: Because of the tightness of the surrounding skin and Because of the size and full thickness nature of the defect, Because of the tightness of the surrounding skin, To maintain form and function, and In order to avoid distortion, a complex closure was planned. After LE anesthesia and prep, Burow's triangles were excised in the relaxed skin tension lines. The wound edges were widely undermined greater than width of the defect on both sides  by dissection in the subcutaneous plane until adequate tissue mobility was obtained. Hemostasis was obtained. The wound edges were closed in a layered fashion using Vicryl and Fast Absorbing Plain Gut sutures. Postoperative length was 4.2 cm.   EBL minimal; complications none; wound care routine.  The patient was discharged in good condition and will return in one week for wound evaluation.  It was a pleasure speaking to Micky Topete today.  Previous clinic notes and pertinent laboratory tests were reviewed prior to Micky Topete's visit.  Signs and Symptoms of skin cancer discussed with patient.  Patient encouraged to perform monthly skin exams.  UV precautions reviewed with patient.  Risks of non-melanoma skin cancer discussed with patient   Return to clinic 6 months        Again, thank you for allowing me to participate in the care of your patient.        Sincerely,        Joseph Barnard MD

## 2024-10-29 NOTE — PROGRESS NOTES
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  Patient has no other skin complaints today.  Remainder of the HPI, Meds, PMH, Allergies, FH, and SH was reviewed in chart.      Past Medical History:   Diagnosis Date    Actinic keratoses     Basal cell carcinoma (BCC) of left lower extremity 09/2023    left medial calf    Basal cell carcinoma of left lower extremity 05/2019    left anterior thigh    Basal cell carcinoma of left upper arm 05/2021    just above left elbow    Basal cell carcinoma of left upper extremity 11/2009    left forearm    Basal cell carcinoma of right upper extremity 03/2020    right upper outer arm    BCC (basal cell carcinoma), face 10/2021    removed via Mohs by Dr. Barnard    Cataract 09/20/2012    Glaucoma (increased eye pressure)     Hyperlipidemia LDL goal < 130     Right-sided low back pain without sciatica, unspecified chronicity age 27       Past Surgical History:   Procedure Laterality Date    APPENDECTOMY      BIOPSY      Left forearm, basal cell    COLONOSCOPY      Age 51    HEAD & NECK SURGERY      5th & 7th cervical disk bulge    SOFT TISSUE SURGERY      Rt shoulder bursitis, ligament stretch    VASECTOMY          Family History   Problem Relation Age of Onset    Diabetes Mother         Started early 60's    Eye Disorder Mother     Glaucoma Mother     Macular Degeneration Mother     Cerebrovascular Disease Mother     Skin Cancer Mother     Hypertension Father         Late onset after correction    Heart Disease Father     Glaucoma Sister     Macular Degeneration Sister     Melanoma Brother     Melanoma Brother     Cancer - colorectal Paternal Grandfather        Social History     Socioeconomic History    Marital status:      Spouse name: [place]    Number of children: 2    Years of education: Not on file    Highest education level: Not on file   Occupational History      Employer: [employer]   Tobacco Use    Smoking status: Never     Passive exposure: Never    Smokeless tobacco: Never   Vaping Use    Vaping status: Never Used   Substance and Sexual Activity    Alcohol use: Yes    Drug use: No    Sexual activity: Yes     Partners: Female     Birth control/protection: Male Surgical     Comment: Having some ED issues   Other Topics Concern    Parent/sibling w/ CABG, MI or angioplasty before 65F 55M? No   Social History Narrative    Not on file     Social Drivers of Health     Financial Resource Strain: Low Risk  (8/23/2024)    Financial Resource Strain     Within the past 12 months, have you or your family members you live with been unable to get utilities (heat, electricity) when it was really needed?: No   Food Insecurity: Low Risk  (8/23/2024)    Food Insecurity     Within the past 12 months, did you worry that your food would run out before you got money to buy more?: No     Within the past 12 months, did the food you bought just not last and you didn t have money to get more?: No   Transportation Needs: Low Risk  (8/23/2024)    Transportation Needs     Within the past 12 months, has lack of transportation kept you from medical appointments, getting your medicines, non-medical meetings or appointments, work, or from getting things that you need?: No   Physical Activity: Insufficiently Active (8/23/2024)    Exercise Vital Sign     Days of Exercise per Week: 2 days     Minutes of Exercise per Session: 20 min   Stress: No Stress Concern Present (8/23/2024)    [employer] [employer] - Occupational Stress Questionnaire     Feeling of Stress : Not at all   Social Connections: Unknown (8/23/2024)    Social Connection and Isolation Panel [NHANES]     Frequency of Communication with Friends and Family: Not on file     Frequency of Social Gatherings with Friends and Family: Three times a week     Attends Congregation Services: Not on file     Active Member  of Clubs or Organizations: Not on file     Attends Club or Organization Meetings: Not on file     Marital Status: Not on file   Interpersonal Safety: Low Risk  (8/26/2024)    Interpersonal Safety     Do you feel physically and emotionally safe where you currently live?: Yes     Within the past 12 months, have you been hit, slapped, kicked or otherwise physically hurt by someone?: No     Within the past 12 months, have you been humiliated or emotionally abused in other ways by your partner or ex-partner?: No   Housing Stability: Low Risk  (8/23/2024)    Housing Stability     Do you have housing? : Yes     Are you worried about losing your housing?: No       Outpatient Encounter Medications as of 10/29/2024   Medication Sig Dispense Refill    calcipotriene (DOVONOX) 0.005 % external cream Mix with fluorouracil and apply BID to affected areas for 5-10 days until desired result is achieved. Wash hands after applying. Avoid sun 60 g 0    diclofenac (VOLTAREN) 1 % topical gel Apply topically daily as needed for moderate pain.      fluorouracil (EFUDEX) 5 % external cream Mix with calcipotriene and apply BID to affected areas for 5-10 days. 40 g 0    sildenafil (REVATIO) 20 MG tablet TAKE 1-4 TABLETS BY MOUTH AS NEEDED PRIOR TO INTERCOURSE. (DO NOT TAKE WITH NITROGLYCERIN, TERAZOSIN, OR DOXAZOSIN) 20 tablet 5    timolol maleate (TIMOPTIC) 0.5 % ophthalmic solution Place 1 drop Into the left eye daily 15 mL 4    valACYclovir (VALTREX) 1000 mg tablet TAKE TWO TABLETS BY MOUTH NOW AND THEN 12 HOURS LATER AS NEEDED FOR COLD SORES 16 tablet 5     No facility-administered encounter medications on file as of 10/29/2024.             O:   NAD, WDWN, Alert & Oriented, Mood & Affect wnl, Vitals stable   General appearance normal   Vitals stable   Alert, oriented and in no acute distress     L forehead 1.7cm red plaque      Eyes: Conjunctivae/lids:Normal     ENT: Lips, mucosa: normal    MSK:Normal    Cardiovascular: peripheral edema  none    Pulm: Breathing Normal    Lymph Nodes: No Head and Neck Lymphadenopathy     Neuro/Psych: Orientation:Alert and Orientedx3 ; Mood/Affect:normal       A/P:  L forehead squamous cell carcinoma   MOHS:   Location    The rationale for Mohs surgery was discussed with the patient and consent was obtained.  The risks and benefits as well as alternatives to therapy were discussed, in detail.  Specifically, the risks of infection, scarring, bleeding, prolonged wound healing, incomplete removal, allergy to anesthesia, nerve injury and recurrence were addressed.  Indication for Mohs was Location. Prior to the procedure, the treatment site was clearly identified and, if available, confirmed with previous photos and confirmed by the patient   All components of the Universal Protocol/PAUSE rule were completed.  The Mohs surgeon operated in two distinct and integrated capacities as the surgeon and pathologist.      The area was prepped with Betasept.  A rim of normal appearing skin was marked circumferentially around the lesion.  The area was infiltrated with local anesthesia.  The tumor was first debulked to remove all clinically apparent tumor.  An incision following the standard Mohs approach was done and the specimen was oriented,mapped and placed in 1 block(s).  Each specimen was then chromacoded and processed in the Mohs laboratory using standard Mohs technique and submitted for frozen section histology.  Frozen section analysis showed  residual tumor but CLEAR MARGINS.    1st stage:There is a proliferation of irregular nests of abnormal squamous cells arising from the epidermis and invading the dermis. These are well differentiated. The dermis shows a variable superficial perivascular inflammatory infiltrate.     The tumor was excised using standard Mohs technique in 1 stages(s).  CLEAR MARGINS OBTAINED and Final defect size was 2.4 x 2.3 cm.     We discussed the options for wound management in full with the patient  including risks/benefits/ possible outcomes.      REPAIR COMPLEX: Because of the tightness of the surrounding skin and Because of the size and full thickness nature of the defect, Because of the tightness of the surrounding skin, To maintain form and function, and In order to avoid distortion, a complex closure was planned. After LE anesthesia and prep, Burow's triangles were excised in the relaxed skin tension lines. The wound edges were widely undermined greater than width of the defect on both sides  by dissection in the subcutaneous plane until adequate tissue mobility was obtained. Hemostasis was obtained. The wound edges were closed in a layered fashion using Vicryl and Fast Absorbing Plain Gut sutures. Postoperative length was 4.2 cm.   EBL minimal; complications none; wound care routine.  The patient was discharged in good condition and will return in one week for wound evaluation.  It was a pleasure speaking to Micky Topete today.  Previous clinic notes and pertinent laboratory tests were reviewed prior to Micky Topete's visit.  Signs and Symptoms of skin cancer discussed with patient.  Patient encouraged to perform monthly skin exams.  UV precautions reviewed with patient.  Risks of non-melanoma skin cancer discussed with patient   Return to clinic 6 months

## 2024-10-29 NOTE — PATIENT INSTRUCTIONS
Sutured Wound Care     [facility]: [phone]    [facility]: [phone]          No strenuous activity for 48 hours. Resume moderate activity in 48 hours. No heavy exercising until you are seen for follow up in one week.     Take Tylenol as needed for discomfort.                         Do not drink alcoholic beverages for 48 hours.     Keep the pressure bandage in place for 24 hours. If the bandage becomes blood tinged or loose, reinforce it with gauze and tape.        (Refer to the reverse side of this page for management of bleeding).    Remove pressure bandage in 24 hours     Leave the flat bandage in place until your follow up appointment.    Keep the bandage dry. Wash around it carefully.    If the tape becomes soiled or starts to come off, reinforce it with additional paper tape.    Do not smoke for 3 weeks; smoking is detrimental to wound healing.    It is normal to have swelling and bruising around the surgical site. The bruising will fade in approximately 10-14 days. Elevate the area to reduce swelling.    Numbness, itchiness and sensitivity to temperature changes can occur after surgery and may take up to 18 months to normalize.      POSSIBLE COMPLICATIONS    BLEEDING:    Leave the bandage in place.  Use tightly rolled up gauze or a cloth to apply direct pressure over the bandage for 20   minutes.  Reapply pressure for an additional 20 minutes if necessary  Call the office or go to the nearest emergency room if pressure fails to stop the bleeding.  Use additional gauze and tape to maintain pressure once the bleeding has stopped.        PAIN:    Post operative pain should slowly get better, never worse.  A severe increase in pain may indicate a problem. Call the office if this occurs.    In case of emergency phone:Dr Barnard [phone]       ONE WEEK AFTER SURGERY ():  -Remove bandage  -Clean and dry the area with plain tap water using a Q-tip or sterile gauze pad  -Reapply  steri strips down incision and cover with paper tape  -Leave bandage in place for 1  week  -Remove bandage on 11/12      when you remove the bandage, you may resume your regular skin care routine, including washing with mild soap and water, applying moisturizer, make-up and sunscreen.    If there are any open or bleeding areas at the incision/graft site you should begin to cover the area with a bandage daily as follows:    Clean and dry the area with plain tap water using a Q-tip or sterile gauze pad.  Apply Polysporin or Bacitracin ointment to the open area.  Cover the wound with a band-aid or a sterile non-stick gauze pad and micropore paper tape.         SIGNS OF INFECTION  - If you notice any of these signs of infection, call your doctor right away: expanding redness around the wound.  - Yellow or greenish-colored pus or cloudy wound drainage.    - Red streaking spreading from the wound.  - Increased swelling, tenderness, or pain around the wound.   - Fever.    Please remember that yellow and clear drainage from a wound can be normal and related to normal wound healing.  Isolated drainage from a wound without a combination of the above features does not indicate infection.     *Once the bandages are removed, the scar will be red and firm (especially in the lip/chin area). This is normal and will fade in time. It might take 6-12 months for this to happen.     *Massaging the area will help the scar soften and fade quicker. Begin to massage the area one month after the bandages have been removed. To massage apply pressure directly and firmly over the scar with the fingertips and move in a circular motion. Massage the area for a few minutes several times a day. Continue to massage the site for several months.    *Approximately 6-8 weeks after surgery it is not uncommon to see the formation of  tender pimple-like  bump along the scar. This is normal. As the scar continues to mature and the stitches underneath the  skin begin to dissolve, this might occur. Do not pick or squeeze, this will resolve on it s own. Should one break open producing a small amount of drainage, apply Polysporin or Bacitracin ointment a few times a day until the wound is completely healed.    *Numbness in the surgical area is expected. It might take 12-18 months for the feeling to return to normal. During this time sensations of itchiness, tingling and occasional sharp pains might be noted. These feelings are normal and will subside once the nerves have completely healed.

## 2024-11-11 ENCOUNTER — OFFICE VISIT (OUTPATIENT)
Dept: OPHTHALMOLOGY | Facility: CLINIC | Age: 69
End: 2024-11-11
Payer: COMMERCIAL

## 2024-11-11 DIAGNOSIS — H25.812 COMBINED FORMS OF AGE-RELATED CATARACT OF LEFT EYE: ICD-10-CM

## 2024-11-11 DIAGNOSIS — H52.4 PRESBYOPIA: ICD-10-CM

## 2024-11-11 DIAGNOSIS — Z01.01 ENCOUNTER FOR EXAMINATION OF EYES AND VISION WITH ABNORMAL FINDINGS: Primary | ICD-10-CM

## 2024-11-11 DIAGNOSIS — H40.002 GLAUCOMA SUSPECT OF LEFT EYE: ICD-10-CM

## 2024-11-11 DIAGNOSIS — H26.8 PSEUDOEXFOLIATION (PXF) OF LEFT LENS CAPSULE: ICD-10-CM

## 2024-11-11 DIAGNOSIS — H43.813 POSTERIOR VITREOUS DETACHMENT OF BOTH EYES: ICD-10-CM

## 2024-11-11 PROCEDURE — 92014 COMPRE OPH EXAM EST PT 1/>: CPT | Performed by: OPHTHALMOLOGY

## 2024-11-11 PROCEDURE — 92015 DETERMINE REFRACTIVE STATE: CPT | Performed by: OPHTHALMOLOGY

## 2024-11-11 ASSESSMENT — REFRACTION_WEARINGRX
OD_CYLINDER: +0.50
SPECS_TYPE: PAL
OS_CYLINDER: +0.50
OS_SPHERE: -2.75
OS_ADD: +2.50
OS_AXIS: 142
OD_AXIS: 001
OD_ADD: +2.50
OD_SPHERE: -2.50

## 2024-11-11 ASSESSMENT — CONF VISUAL FIELD
OD_SUPERIOR_TEMPORAL_RESTRICTION: 0
OS_INFERIOR_NASAL_RESTRICTION: 0
OD_INFERIOR_NASAL_RESTRICTION: 0
OS_INFERIOR_TEMPORAL_RESTRICTION: 0
OD_NORMAL: 1
OD_SUPERIOR_NASAL_RESTRICTION: 0
OS_SUPERIOR_NASAL_RESTRICTION: 0
OS_NORMAL: 1
OS_SUPERIOR_TEMPORAL_RESTRICTION: 0
OD_INFERIOR_TEMPORAL_RESTRICTION: 0

## 2024-11-11 ASSESSMENT — VISUAL ACUITY
METHOD: SNELLEN - LINEAR
OS_CC: 20/25
CORRECTION_TYPE: GLASSES
OS_CC: J1-
OD_CC: 20/30
OD_CC: J1

## 2024-11-11 ASSESSMENT — REFRACTION_MANIFEST
OD_AXIS: 165
OS_AXIS: 166
OD_SPHERE: -2.25
OS_SPHERE: -2.00
OD_ADD: +2.50
OS_ADD: +2.50
OS_CYLINDER: +0.25
OD_CYLINDER: +0.50

## 2024-11-11 ASSESSMENT — SLIT LAMP EXAM - LIDS
COMMENTS: NORMAL
COMMENTS: NORMAL

## 2024-11-11 ASSESSMENT — TONOMETRY
IOP_METHOD: APPLANATION
OD_IOP_MMHG: 21
OS_IOP_MMHG: 23

## 2024-11-11 ASSESSMENT — CUP TO DISC RATIO
OS_RATIO: 0.3
OD_RATIO: 0.2

## 2024-11-11 ASSESSMENT — EXTERNAL EXAM - RIGHT EYE: OD_EXAM: NORMAL

## 2024-11-11 ASSESSMENT — EXTERNAL EXAM - LEFT EYE: OS_EXAM: NORMAL

## 2024-11-11 NOTE — PATIENT INSTRUCTIONS
"Continue:   Timolol (yellow top) every morning left eye.   Glasses prescription given - optional  May use artificial tears up to four times a day (like Refresh Optive, Systane Balance, TheraTears, or generic artificial tears are ok. Avoid \"get the red out\" drops).     Return visit 6 months for an intraocular pressure check, glaucoma OCT, retinal OCT, and [facility] Visual Field.   Jerardo Orta M.D.  [phone]    "

## 2024-11-11 NOTE — LETTER
"11/11/2024      Micky Topete  [address] [facility] [address]      Dear Colleague,    Thank you for referring your patient, Micky Topete, to the [facility]. Please see a copy of my visit note below.     Current Eye Medications:  Timolol left eye every evening.  Last drops:  10pm.     Subjective:  Comprehensive Eye Exam.  The patient complains of some decreasing vision in his left eye intermittently, especially when reading.  Vision in his right eye appears unchanged.  Reading vision is good without correction.       Objective:  See Ophthalmology Exam.       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  Mild cataract left eye.      ICD-10-CM    1. Encounter for examination of eyes and vision with abnormal findings  Z01.01       2. Presbyopia  H52.4       3. Glaucoma suspect of left eye - treated  H40.002       4. 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 Combined forms of age-related cataract, mild, of left eye  H25.812       6. Posterior vitreous detachment of both eyes  H43.813            Plan:  Continue:   Timolol (yellow top) every morning left eye.   Glasses prescription given - optional  May use artificial tears up to four times a day (like Refresh Optive, Systane Balance, TheraTears, or generic artificial tears are ok. Avoid \"get the red out\" drops).     Return visit 6 months for an intraocular pressure check, glaucoma OCT, retinal OCT, and George Visual Field.   Jerardo Orta M.D.  [phone]         Again, thank you for allowing me to participate in the care of your patient.        Sincerely,        Jerardo Orta MD  "

## 2024-11-11 NOTE — PROGRESS NOTES
" Current Eye Medications:  Timolol left eye every evening.  Last drops:  10pm.     Subjective:  Comprehensive Eye Exam.  The patient complains of some decreasing vision in his left eye intermittently, especially when reading.  Vision in his right eye appears unchanged.  Reading vision is good without correction.       Objective:  See Ophthalmology Exam.       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  Mild cataract left eye.      ICD-10-CM    1. Encounter for examination of eyes and vision with abnormal findings  Z01.01       2. Presbyopia  H52.4       3. Glaucoma suspect of left eye - treated  H40.002       4. 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 Combined forms of age-related cataract, mild, of left eye  H25.812       6. Posterior vitreous detachment of both eyes  H43.813            Plan:  Continue:   Timolol (yellow top) every morning left eye.   Glasses prescription given - optional  May use artificial tears up to four times a day (like Refresh Optive, Systane Balance, TheraTears, or generic artificial tears are ok. Avoid \"get the red out\" drops).     Return visit 6 months for an intraocular pressure check, glaucoma OCT, retinal OCT, and George Visual Field.   Jerardo Orta M.D.  [phone]       "

## 2025-03-04 ENCOUNTER — TELEPHONE (OUTPATIENT)
Dept: DERMATOLOGY | Facility: CLINIC | Age: 70
End: 2025-03-04
Payer: COMMERCIAL

## 2025-03-04 DIAGNOSIS — D09.9 SQUAMOUS CELL CARCINOMA IN SITU (SCCIS): Primary | ICD-10-CM

## 2025-03-04 NOTE — TELEPHONE ENCOUNTER
Spoke with patient and gave results below.     Patient expressed understanding. Patient would like to do ED&C     appt scheduled.     Zandra PEREZ RN BSN  [facility] Dermatology  [phone]

## 2025-03-04 NOTE — TELEPHONE ENCOUNTER
"----- Message from Claudia Dickson sent at 3/4/2025 12:51 PM CST -----  Please call the patient and discuss options of ED&C versus excision for treatment of skin cancer.  Schedule ED&C with me or excision with dermatology surgery.       Delfino Nix,     We got your biopsy results back regarding the left mid back. It showed a squamous cell skin cancer.I know you have had both basal cell carcinoma and Squamous Cell skin cancer so you know they are usually caused by years of sun exposure. This skin cancer typically is well contained within the skin and rarely spreads elsewhere in the body. If left untreated it will continue to grow over time and may cause issues with deformity or spreading, therefore complete removal is recommended.     Because the subtype of your Squamous cell carcinoma said \"in-situ\" we think that it is high in the skin and there was no deeper components in the sample we took. Therefore, there are two options for treatment of this spot. I could see you back in clinic for follow up and treat with a procedure called electrodesiccation and curettage (ED&C). This is essentially scraping away the residual cancer cells and cauterizing the base. We do this right in clinic similar to the biopsy with local lidocaine and you will leave the clinic with a larger but similar type of sore that you have now. It will heal in with no stitches and nothing to send to the lab. We do not confirm anything through the lab and clinically just watch at an annual skin exam that nothing appears to be regrowing.     The second way of removing this would be through an excision by our dermatology surgeons. They also locally numb the area like we did with the biopsy, and cut the skin cancer out with a margin of normal appearing skin. It will be put back together with stitches and the area that was removed will be sent to lab for confirmation that the entire skin cancer was removed.     If you could let me know what technique sounds " better to you we will help you proceed with scheduling follow up. Please let me know if you have any further questions. A nurse will be reaching out to you within the next couple of days to get your preference of treatment and schedule accordingly.     Ways to reduce your risk of more skin cancer are to wear clothing and sunscreen when outdoors, and using a daily facial moisturizer with at least SPF 30 or higher on a daily basis on your face, ears and neck. Some examples of these are Cera Ve AM facial moisturizing lotion, Eucerin daily protectant facial lotion, La Roche Possay ultra light sunscreen fluid but there are many option at drug stores, [employer] etc.     Juana Dickson CNP  Dermatology   Written by J CARLOS Ramsey CNP on 3/4/2025 12:51 PM CST

## 2025-03-13 ENCOUNTER — OFFICE VISIT (OUTPATIENT)
Dept: DERMATOLOGY | Facility: CLINIC | Age: 70
End: 2025-03-13
Payer: COMMERCIAL

## 2025-03-13 DIAGNOSIS — D09.9 SQUAMOUS CELL CARCINOMA IN SITU (SCCIS): ICD-10-CM

## 2025-03-13 DIAGNOSIS — D04.5 SQUAMOUS CELL CARCINOMA IN SITU (SCCIS) OF SKIN OF BACK: Primary | ICD-10-CM

## 2025-03-13 NOTE — LETTER
3/13/2025      Micky Topete  [address] [facility] [address]      Dear Colleague,    Thank you for referring your patient, Micky Topete, to the [facility]. Please see a copy of my visit note below.    [facility] Dermatology Note  Encounter Date: Mar 13, 2025  Office Visit     Reviewed patients past medical history and pertinent chart review prior to patients visit today.     Dermatology Problem List:     History of BCC  BCC  On right cheek removed 10/21  BCC on left leg removed 10/21  BCC on right leg removed 3/22  BCC below left knee excised by PCP 9/21/23  BCC mid upper back, excision 4/22/24  SCC left forehead, mohs 10/8/24  SCC-IS left mid back, ED&C 3/13/2025     History of actinic keratosis treated with PDT and cryo. 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  Verbal informed consent was obtained.  Discussed risks including but not limited to: pain, bleeding, infection, scarring (the latter being essentially guaranteed)  The lesion was cleansed with a 70% isopropyl alcohol wipe and then injected with lidocaine 1% with 1:132435 epinephrine. After anesthesia was ensured, a 4 mm nondisposable curette was used to remove the epidermis and superficial dermis of the entire visible lesion. A 4 mm margin around the periphery was gently passed over using the curette to evaluate to subclinical disease. Three cycles of curettage follow by electrofulguration and electrodessication of the base in standard fashion was  performed.  The wound was then dressed with vaseline and a bandage; subsequent wound care was discussed in detail.    The final size of the defect was 1.3    Follow-up:  6-12 months for follow up full body skin exam, prn for new or changing lesions or new concerns     Juana Dickosn CNP  Dermatology    _______________________________________    CC: Procedure (ED&C LEFT MID BACK)    HPI:  Mr. Micky Topete is a(n) 69 year old male who presents today as a return patient for treatment of SCC in situ of the left mid back biopsied 2/28/25.     Patient is otherwise feeling well, without additional skin concerns.      Physical Exam:  Exam of back only  -left mid back, 1.2 cm scar with central crust    - No other lesions of concern on areas examined.     Photo taken from pre-biopsy    Medications:  Current Outpatient Medications   Medication Sig Dispense Refill     calcipotriene (DOVONOX) 0.005 % external cream Mix with fluorouracil and apply BID to affected areas for 5-10 days until desired result is achieved. Wash hands after applying. Avoid sun 60 g 0     diclofenac (VOLTAREN) 1 % topical gel Apply topically daily as needed for moderate pain.       fluorouracil (EFUDEX) 5 % external cream Mix with calcipotriene and apply BID to affected areas for 5-10 days. 40 g 0     sildenafil (REVATIO) 20 MG tablet TAKE 1-4 TABLETS BY MOUTH AS NEEDED PRIOR TO INTERCOURSE. (DO NOT TAKE WITH NITROGLYCERIN, TERAZOSIN, OR DOXAZOSIN) 20 tablet 5     timolol maleate (TIMOPTIC) 0.5 % ophthalmic solution Place 1 drop Into the left eye daily 15 mL 4     valACYclovir (VALTREX) 1000 mg tablet TAKE TWO TABLETS BY MOUTH NOW AND THEN 12 HOURS LATER AS NEEDED FOR COLD SORES 16 tablet 5     No current facility-administered medications for this visit.      Past Medical History:   Patient Active Problem List   Diagnosis     Hyperlipidemia LDL goal <100     History of basal cell cancer     Cataract of both eyes     Actinic keratoses     Erectile  dysfunction, unspecified erectile dysfunction type     Right-sided low back pain without sciatica, unspecified chronicity     Posterior vitreous detachment of both eyes     Glaucoma suspect of left eye - treated     Pseudoexfoliation (PXF) of left lens capsule     Umbilical hernia without obstruction and without gangrene     FH: prostate cancer     Past Medical History:   Diagnosis Date     Actinic keratoses      Basal cell carcinoma (BCC) of left lower extremity 09/2023    left medial calf     Basal cell carcinoma of left lower extremity 05/2019    left anterior thigh     Basal cell carcinoma of left upper arm 05/2021    just above left elbow     Basal cell carcinoma of left upper extremity 11/2009    left forearm     Basal cell carcinoma of right upper extremity 03/2020    right upper outer arm     BCC (basal cell carcinoma), face 10/2021    removed via Mohs by Dr. Barnard     Cataract 09/20/2012     Glaucoma (increased eye pressure)      Hyperlipidemia LDL goal < 130      Right-sided low back pain without sciatica, unspecified chronicity age 27     Squamous cell carcinoma of skin, unspecified        CC Federico Salas MD  [address] [facility]  [place] [id number] on close of this encounter.       Again, thank you for allowing me to participate in the care of your patient.        Sincerely,        Claudia Dickson, J CARLOS CNP    Electronically signed

## 2025-03-13 NOTE — PROGRESS NOTES
[facility] Dermatology Note  Encounter Date: Mar 13, 2025  Office Visit     Reviewed patients past medical history and pertinent chart review prior to patients visit today.     Dermatology Problem List:     History of BCC  BCC  On right cheek removed 10/21  BCC on left leg removed 10/21  BCC on right leg removed 3/22  BCC below left knee excised by PCP 9/21/23  BCC mid upper back, excision 4/22/24  SCC left forehead, mohs 10/8/24  SCC-IS left mid back, ED&C 3/13/2025     History of actinic keratosis treated with PDT and cryo. 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  Verbal informed consent was obtained.  Discussed risks including but not limited to: pain, bleeding, infection, scarring (the latter being essentially guaranteed)  The lesion was cleansed with a 70% isopropyl alcohol wipe and then injected with lidocaine 1% with 1:217526 epinephrine. After anesthesia was ensured, a 4 mm nondisposable curette was used to remove the epidermis and superficial dermis of the entire visible lesion. A 4 mm margin around the periphery was gently passed over using the curette to evaluate to subclinical disease. Three cycles of curettage follow by electrofulguration and electrodessication of the base in standard fashion was performed.  The wound was then dressed with vaseline and a bandage; subsequent wound care was discussed in detail.    The final size of the defect was 1.3    Follow-up:  6-12 months for follow up full body skin exam, prn for new or changing lesions or  new concerns     Juana Dickson, [facility]  Dermatology    _______________________________________    CC: Procedure (ED&C LEFT MID BACK)    HPI:  Mr. Micky Topete is a(n) 69 year old male who presents today as a return patient for treatment of SCC in situ of the left mid back biopsied 2/28/25.     Patient is otherwise feeling well, without additional skin concerns.      Physical Exam:  Exam of back only  -left mid back, 1.2 cm scar with central crust    - No other lesions of concern on areas examined.     Photo taken from pre-biopsy    Medications:  Current Outpatient Medications   Medication Sig Dispense Refill    calcipotriene (DOVONOX) 0.005 % external cream Mix with fluorouracil and apply BID to affected areas for 5-10 days until desired result is achieved. Wash hands after applying. Avoid sun 60 g 0    diclofenac (VOLTAREN) 1 % topical gel Apply topically daily as needed for moderate pain.      fluorouracil (EFUDEX) 5 % external cream Mix with calcipotriene and apply BID to affected areas for 5-10 days. 40 g 0    sildenafil (REVATIO) 20 MG tablet TAKE 1-4 TABLETS BY MOUTH AS NEEDED PRIOR TO INTERCOURSE. (DO NOT TAKE WITH NITROGLYCERIN, TERAZOSIN, OR DOXAZOSIN) 20 tablet 5    timolol maleate (TIMOPTIC) 0.5 % ophthalmic solution Place 1 drop Into the left eye daily 15 mL 4    valACYclovir (VALTREX) 1000 mg tablet TAKE TWO TABLETS BY MOUTH NOW AND THEN 12 HOURS LATER AS NEEDED FOR COLD SORES 16 tablet 5     No current facility-administered medications for this visit.      Past Medical History:   Patient Active Problem List   Diagnosis    Hyperlipidemia LDL goal <100    History of basal cell cancer    Cataract of both eyes    Actinic keratoses    Erectile dysfunction, unspecified erectile dysfunction type    Right-sided low back pain without sciatica, unspecified chronicity    Posterior vitreous detachment of both eyes    Glaucoma suspect of left eye - treated    Pseudoexfoliation (PXF) of left lens capsule    Umbilical  hernia without obstruction and without gangrene    FH: prostate cancer     Past Medical History:   Diagnosis Date    Actinic keratoses     Basal cell carcinoma (BCC) of left lower extremity 09/2023    left medial calf    Basal cell carcinoma of left lower extremity 05/2019    left anterior thigh    Basal cell carcinoma of left upper arm 05/2021    just above left elbow    Basal cell carcinoma of left upper extremity 11/2009    left forearm    Basal cell carcinoma of right upper extremity 03/2020    right upper outer arm    BCC (basal cell carcinoma), face 10/2021    removed via Mohs by Dr. Barnard    Cataract 09/20/2012    Glaucoma (increased eye pressure)     Hyperlipidemia LDL goal < 130     Right-sided low back pain without sciatica, unspecified chronicity age 27    Squamous cell carcinoma of skin, unspecified        CC Federico Salas MD  [address] [facility]  [place] [id number] on close of this encounter.

## 2025-06-30 DIAGNOSIS — H40.002 GLAUCOMA SUSPECT OF LEFT EYE: ICD-10-CM

## 2025-06-30 RX ORDER — TIMOLOL MALEATE 5 MG/ML
1 SOLUTION/ DROPS OPHTHALMIC DAILY
Qty: 15 ML | Refills: 0 | Status: SHIPPED | OUTPATIENT
Start: 2025-06-30

## 2025-07-28 ENCOUNTER — PATIENT OUTREACH (OUTPATIENT)
Dept: CARE COORDINATION | Facility: CLINIC | Age: 70
End: 2025-07-28
Payer: COMMERCIAL

## 2025-08-11 ENCOUNTER — PATIENT OUTREACH (OUTPATIENT)
Dept: CARE COORDINATION | Facility: CLINIC | Age: 70
End: 2025-08-11
Payer: COMMERCIAL